# Patient Record
Sex: FEMALE | Race: WHITE | NOT HISPANIC OR LATINO | Employment: OTHER | URBAN - METROPOLITAN AREA
[De-identification: names, ages, dates, MRNs, and addresses within clinical notes are randomized per-mention and may not be internally consistent; named-entity substitution may affect disease eponyms.]

---

## 2017-01-05 ENCOUNTER — OFFICE VISIT (OUTPATIENT)
Dept: DERMATOLOGY | Facility: CLINIC | Age: 77
End: 2017-01-05
Payer: MEDICARE

## 2017-01-05 DIAGNOSIS — L57.0 ACTINIC KERATOSES: ICD-10-CM

## 2017-01-05 DIAGNOSIS — L82.1 SEBORRHEIC KERATOSIS: Primary | ICD-10-CM

## 2017-01-05 PROCEDURE — 99999 PR PBB SHADOW E&M-EST. PATIENT-LVL II: CPT | Mod: PBBFAC,,, | Performed by: DERMATOLOGY

## 2017-01-05 PROCEDURE — 1157F ADVNC CARE PLAN IN RCRD: CPT | Mod: S$GLB,,, | Performed by: DERMATOLOGY

## 2017-01-05 PROCEDURE — 17000 DESTRUCT PREMALG LESION: CPT | Mod: S$GLB,,, | Performed by: DERMATOLOGY

## 2017-01-05 PROCEDURE — 17003 DESTRUCT PREMALG LES 2-14: CPT | Mod: S$GLB,,, | Performed by: DERMATOLOGY

## 2017-01-05 PROCEDURE — 1159F MED LIST DOCD IN RCRD: CPT | Mod: S$GLB,,, | Performed by: DERMATOLOGY

## 2017-01-05 PROCEDURE — 1160F RVW MEDS BY RX/DR IN RCRD: CPT | Mod: S$GLB,,, | Performed by: DERMATOLOGY

## 2017-01-05 PROCEDURE — 99203 OFFICE O/P NEW LOW 30 MIN: CPT | Mod: 25,S$GLB,, | Performed by: DERMATOLOGY

## 2017-01-05 NOTE — MR AVS SNAPSHOT
[facility] Dermatology  [address] [place] [address]  Phone: [phone]  Fax: [phone]                  Shannon PEGUERO Solano   2017 8:15 AM   Office Visit    Description:  Female : 1940   Provider:  Nydia Shahid MD   Department:  [facility] - Dermatology           Reason for Visit     Spot           Diagnoses this Visit        Comments    Seborrheic keratosis    -  Primary     Actinic keratoses                To Do List           Goals (5 Years of Data)     None      Follow-Up and Disposition     Return in about 3 months (around 2017).    Follow-up and Disposition History      [facility] On [facility]     [facility] On Call Nurse Care Line -  Assistance  Registered nurses in the [facility] On Call Center provide clinical advisement, health education, appointment booking, and other advisory services.  Call for this free service at [phone].             Medications           Message regarding Medications     Verify the changes and/or additions to your medication regime listed below are the same as discussed with your clinician today.  If any of these changes or additions are incorrect, please notify your healthcare provider.        STOP taking these medications     vitamin D 1000 units Tab Take 185 mg by mouth once daily.    irbesartan (AVAPRO) 300 MG tablet TAKE 1 TABLET EVERY DAY    hydrocodone-acetaminophen 5-325mg (NORCO) 5-325 mg per tablet Take 1 tablet by mouth every 6 (six) hours as needed for Pain.    DOC-Q-LACE 100 mg capsule TAKE ONE CAPSULE BY MOUTH TWICE DAILY           Verify that the below list of medications is an accurate representation of the medications you are currently taking.  If none reported, the list may be blank. If incorrect, please contact your healthcare provider. Carry this list with you in case of emergency.           Current Medications     aspirin 325 MG tablet Take 325 mg by mouth as needed.     pravastatin (PRAVACHOL) 40 MG tablet Take 1 tablet (40 mg total) by  mouth once daily.           Clinical Reference Information           Allergies as of 1/5/2017     No Known Allergies      Immunizations Administered on Date of Encounter - 1/5/2017     None      [facility] Sign-Up     Activating your [facility] account is as easy as 1-2-3!     1) Visit [email or web address], select Sign Up Now, enter this activation code and your date of birth, then select Next.  [email or web address]  Expires: 2/19/2017  9:06 AM      2) Create a username and password to use when you visit [facility] in the future and select a security question in case you lose your password and select Next.    3) Enter your e-mail address and click Sign Up!    Additional Information  If you have questions, please e-mail [email or web address] or call [phone] to talk to our [facility] staff. Remember, [facility] is NOT to be used for urgent needs. For medical emergencies, dial 911.         Instructions    CRYOSURGERY  [facility] [phone]      Your doctor has used a method called cryosurgery to treat your skin condition. Cryosurgery refers to the use of very cold substances to treat a variety of skin conditions such as warts, pre-skin cancers, molluscum contagiosum, sun spots, and several benign growths. The substance we use in cryosurgery is liquid nitrogen and is so cold (-195 degrees Celsius) that is burns when administered.     Following treatment in the office, the skin may immediately burn and become red. You may find the area around the lesion is affected as well. It is sometimes necessary to treat not only the lesion, but a small area of the surrounding normal skin to achieve a good response.     A blister, and even a blood filled blister, may form after treatment.   This is a normal response. If the blister is painful, it is acceptable to sterilize a needle and with rubbing alcohol and gently pop the blister. It is important that you gently wash the area with soap and warm water as the blister fluid may contain wart  virus if a wart was treated. Do no remove the roof of the blister.     The area treated can take anywhere from 1-3 weeks to heal. Healing time depends on the kind of skin lesion treated, the location, and how aggressively the lesion was treated. It is recommended that the areas treated are covered with Vaseline or bacitracin ointment and a band-aid. If a band-aid is not practical, just ointment applied several times per day will do. Keeping these areas moist will speed the healing time.    Treatment with liquid nitrogen can leave a scar. In dark skin, it may be a light or dark scar, in light skin it may be a white or pink scar. These will generally fade with time.    If you have any concerns after your treatment, please feel free to call the office.         [facility] DERMATOLOGY  [address] [place] [address]  Dept: [phone]  Dept Fax: [phone]

## 2017-01-05 NOTE — PATIENT INSTRUCTIONS
CRYOSURGERY  [facility] [phone]      Your doctor has used a method called cryosurgery to treat your skin condition. Cryosurgery refers to the use of very cold substances to treat a variety of skin conditions such as warts, pre-skin cancers, molluscum contagiosum, sun spots, and several benign growths. The substance we use in cryosurgery is liquid nitrogen and is so cold (-195 degrees Celsius) that is burns when administered.     Following treatment in the office, the skin may immediately burn and become red. You may find the area around the lesion is affected as well. It is sometimes necessary to treat not only the lesion, but a small area of the surrounding normal skin to achieve a good response.     A blister, and even a blood filled blister, may form after treatment.   This is a normal response. If the blister is painful, it is acceptable to sterilize a needle and with rubbing alcohol and gently pop the blister. It is important that you gently wash the area with soap and warm water as the blister fluid may contain wart virus if a wart was treated. Do no remove the roof of the blister.     The area treated can take anywhere from 1-3 weeks to heal. Healing time depends on the kind of skin lesion treated, the location, and how aggressively the lesion was treated. It is recommended that the areas treated are covered with Vaseline or bacitracin ointment and a band-aid. If a band-aid is not practical, just ointment applied several times per day will do. Keeping these areas moist will speed the healing time.    Treatment with liquid nitrogen can leave a scar. In dark skin, it may be a light or dark scar, in light skin it may be a white or pink scar. These will generally fade with time.    If you have any concerns after your treatment, please feel free to call the office.         [facility] DERMATOLOGY  [address] [place] [address]  Dept: [phone]  Dept Fax: [phone]

## 2017-01-05 NOTE — PROGRESS NOTES
Subjective:       Patient ID:  Shannon Solano is a 76 y.o. female who presents for   Chief Complaint   Patient presents with    Spot     c/o rough spots on bilateral forearms x 1 yr or more.      HPI Comments: History of Present Illness: The patient presents with chief complaint of lesions.  Location: bilateral forearms  Duration: 1 year  Signs/Symptoms: scaly    Prior treatments: none    The patient denies personal or family history of skin cancer.        Spot         Review of Systems   Constitutional: Negative for fever, chills, weight loss, fatigue, night sweats and malaise.   Gastrointestinal: Negative for indigestion.   Skin: Negative for daily sunscreen use and activity-related sunscreen use.   Hematologic/Lymphatic: Negative for adenopathy. Does not bruise/bleed easily.        Objective:    Physical Exam   Constitutional: She appears well-developed and well-nourished. No distress.   Neurological: She is alert and oriented to person, place, and time. She is not disoriented.   Psychiatric: She has a normal mood and affect.   Skin:   Areas Examined (abnormalities noted in diagram):   Head / Face Inspection Performed  Neck Inspection Performed  Chest / Axilla Inspection Performed  Abdomen Inspection Performed  Back Inspection Performed  RUE Inspected  LUE Inspection Performed  RLE Inspected  LLE Inspection Performed  Nails and Digits Inspection Performed                       Diagram Legend     Erythematous scaling macule/papule c/w actinic keratosis       Vascular papule c/w angioma      Pigmented verrucoid papule/plaque c/w seborrheic keratosis         Assessment / Plan:        Seborrheic keratosis  Reassurance given. Discussed diagnosis and that lesions are benign.  AAD handout given.     Actinic keratoses  Cryosurgery Procedure Note    The patient is informed of the precancerous quality and need for treatment of these lesions. After risks, benefits and alternatives explained, including blistering, pain,  hyper- and hypopigmentation, patient verbally consents to cryotherapy to precancerous lesions. Liquid nitrogen cryosurgery is applied to the 9 actinic keratoses, as detailed in the physical exam, to produce a freeze injury. The patient is aware that blisters may form and is instructed on wound care with gentle cleansing and use of vaseline ointment to keep moist until healed. The patient is supplied a handout on cryosurgery and is instructed to call if lesions do not completely resolve.           Return in about 3 months (around 4/5/2017).

## 2017-01-23 RX ORDER — IRBESARTAN 300 MG/1
300 TABLET ORAL DAILY
Refills: 2 | COMMUNITY
Start: 2016-12-19 | End: 2017-01-23 | Stop reason: SDUPTHER

## 2017-01-23 RX ORDER — IRBESARTAN 300 MG/1
300 TABLET ORAL DAILY
Qty: 30 TABLET | Refills: 0 | Status: SHIPPED | OUTPATIENT
Start: 2017-01-23 | End: 2017-07-02 | Stop reason: SDUPTHER

## 2017-01-23 NOTE — TELEPHONE ENCOUNTER
----- Message from Izzy Cardenas sent at 1/23/2017  4:28 PM CST -----  Contact: Charisse - [facility]  Charisse calling for Rx refill request on Avapro 300 mg...    [id number] - [place] - [address] AT [facility]  [address]  [place] [address]  Phone: [phone] Fax: [phone]    Thx///channing

## 2017-01-30 ENCOUNTER — OFFICE VISIT (OUTPATIENT)
Dept: FAMILY MEDICINE | Facility: CLINIC | Age: 77
End: 2017-01-30
Payer: MEDICARE

## 2017-01-30 VITALS
TEMPERATURE: 96 F | SYSTOLIC BLOOD PRESSURE: 126 MMHG | OXYGEN SATURATION: 95 % | DIASTOLIC BLOOD PRESSURE: 70 MMHG | HEART RATE: 70 BPM | WEIGHT: 149.06 LBS | HEIGHT: 66 IN | RESPIRATION RATE: 16 BRPM | BODY MASS INDEX: 23.95 KG/M2

## 2017-01-30 DIAGNOSIS — H61.21 IMPACTED CERUMEN OF RIGHT EAR: ICD-10-CM

## 2017-01-30 DIAGNOSIS — J06.9 VIRAL UPPER RESPIRATORY TRACT INFECTION: Primary | ICD-10-CM

## 2017-01-30 DIAGNOSIS — R10.9 FLANK PAIN: ICD-10-CM

## 2017-01-30 LAB
BILIRUB SERPL-MCNC: NEGATIVE MG/DL
BLOOD URINE, POC: NEGATIVE
COLOR, POC UA: YELLOW
FLUAV AG SPEC QL IA: NEGATIVE
FLUBV AG SPEC QL IA: NEGATIVE
GLUCOSE UR QL STRIP: NORMAL
KETONES UR QL STRIP: NEGATIVE
LEUKOCYTE ESTERASE URINE, POC: NEGATIVE
NITRITE, POC UA: NEGATIVE
PH, POC UA: 5
PROTEIN, POC: NEGATIVE
SPECIFIC GRAVITY, POC UA: 1.02
SPECIMEN SOURCE: NORMAL
UROBILINOGEN, POC UA: NORMAL

## 2017-01-30 PROCEDURE — 69210 REMOVE IMPACTED EAR WAX UNI: CPT | Mod: S$GLB,,, | Performed by: NURSE PRACTITIONER

## 2017-01-30 PROCEDURE — 1159F MED LIST DOCD IN RCRD: CPT | Mod: S$GLB,,, | Performed by: NURSE PRACTITIONER

## 2017-01-30 PROCEDURE — 3078F DIAST BP <80 MM HG: CPT | Mod: S$GLB,,, | Performed by: NURSE PRACTITIONER

## 2017-01-30 PROCEDURE — 96372 THER/PROPH/DIAG INJ SC/IM: CPT | Mod: 59,S$GLB,, | Performed by: NURSE PRACTITIONER

## 2017-01-30 PROCEDURE — 87400 INFLUENZA A/B EACH AG IA: CPT | Mod: 59

## 2017-01-30 PROCEDURE — 81002 URINALYSIS NONAUTO W/O SCOPE: CPT | Mod: S$GLB,,, | Performed by: NURSE PRACTITIONER

## 2017-01-30 PROCEDURE — 1157F ADVNC CARE PLAN IN RCRD: CPT | Mod: S$GLB,,, | Performed by: NURSE PRACTITIONER

## 2017-01-30 PROCEDURE — 99999 PR PBB SHADOW E&M-EST. PATIENT-LVL IV: CPT | Mod: PBBFAC,,, | Performed by: NURSE PRACTITIONER

## 2017-01-30 PROCEDURE — 99213 OFFICE O/P EST LOW 20 MIN: CPT | Mod: 25,S$GLB,, | Performed by: NURSE PRACTITIONER

## 2017-01-30 PROCEDURE — 1160F RVW MEDS BY RX/DR IN RCRD: CPT | Mod: S$GLB,,, | Performed by: NURSE PRACTITIONER

## 2017-01-30 PROCEDURE — 3074F SYST BP LT 130 MM HG: CPT | Mod: S$GLB,,, | Performed by: NURSE PRACTITIONER

## 2017-01-30 RX ORDER — BENZONATATE 100 MG/1
200 CAPSULE ORAL 3 TIMES DAILY PRN
Qty: 40 CAPSULE | Refills: 0 | Status: SHIPPED | OUTPATIENT
Start: 2017-01-30 | End: 2017-02-09

## 2017-01-30 RX ORDER — METHYLPREDNISOLONE ACETATE 40 MG/ML
40 INJECTION, SUSPENSION INTRA-ARTICULAR; INTRALESIONAL; INTRAMUSCULAR; SOFT TISSUE
Status: COMPLETED | OUTPATIENT
Start: 2017-01-30 | End: 2017-01-30

## 2017-01-30 RX ORDER — FLUTICASONE PROPIONATE 50 MCG
1 SPRAY, SUSPENSION (ML) NASAL DAILY
Qty: 16 G | Refills: 0 | Status: SHIPPED | OUTPATIENT
Start: 2017-01-30 | End: 2017-10-06

## 2017-01-30 RX ADMIN — METHYLPREDNISOLONE ACETATE 40 MG: 40 INJECTION, SUSPENSION INTRA-ARTICULAR; INTRALESIONAL; INTRAMUSCULAR; SOFT TISSUE at 03:01

## 2017-01-30 NOTE — MR AVS SNAPSHOT
[facility] Medicine  [address]  [facility] [address]  Phone: [phone]  Fax: [phone]                  Shannon Solano   2017 2:00 PM   Office Visit    Description:  Female : 1940   Provider:  Neelima Thornton NP   Department:  [facility]           Reason for Visit     Cough     Dizziness           Diagnoses this Visit        Comments    Viral upper respiratory tract infection    -  Primary     Flank pain         Impacted cerumen of right ear                To Do List           Future Appointments        Provider Department Dept Phone    2017 10:00 AM Nydia Shahid MD [facility] - Dermatology [phone]      Goals (5 Years of Data)     None       These Medications        Disp Refills Start End    fluticasone (FLONASE) 50 mcg/actuation nasal spray 16 g 0 2017     1 spray by Each Nare route once daily. - Each Nare    [email or web address] [id number] [facility] [address] AT [facility] [address]  Ph #: [phone]       benzonatate (TESSALON) 100 MG capsule 40 capsule 0 2017    Take 2 capsules (200 mg total) by mouth 3 (three) times daily as needed. - Oral    Pharmacy: [facility] [id number] [facility] [address] AT [facility] [address]  Ph #: [phone]         [facility] On Call     [facility] On [facility] Nurse Care Line -  Assistance  Registered nurses in the [facility] On Call Center provide clinical advisement, health education, appointment booking, and other advisory services.  Call for this free service at [phone].             Medications           Message regarding Medications     Verify the changes and/or additions to your medication regime listed below are the same as discussed with your clinician today.  If any of these changes or additions are incorrect, please notify your healthcare provider.        START taking these NEW medications        Refills     "fluticasone (FLONASE) 50 mcg/actuation nasal spray 0    Si spray by Each Nare route once daily.    Class: Normal    Route: Each Nare    benzonatate (TESSALON) 100 MG capsule 0    Sig: Take 2 capsules (200 mg total) by mouth 3 (three) times daily as needed.    Class: Normal    Route: Oral      These medications were administered today        Dose Freq    methylPREDNISolone acetate injection 40 mg 40 mg Clinic/HOD 1 time    Sig: Inject 1 mL (40 mg total) into the muscle one time.    Class: Normal    Route: Intramuscular           Verify that the below list of medications is an accurate representation of the medications you are currently taking.  If none reported, the list may be blank. If incorrect, please contact your healthcare provider. Carry this list with you in case of emergency.           Current Medications     aspirin 325 MG tablet Take 325 mg by mouth as needed.     irbesartan (AVAPRO) 300 MG tablet Take 1 tablet (300 mg total) by mouth once daily.    pravastatin (PRAVACHOL) 40 MG tablet Take 1 tablet (40 mg total) by mouth once daily.    benzonatate (TESSALON) 100 MG capsule Take 2 capsules (200 mg total) by mouth 3 (three) times daily as needed.    fluticasone (FLONASE) 50 mcg/actuation nasal spray 1 spray by Each Nare route once daily.           Clinical Reference Information           Vital Signs - Last Recorded  Most recent update: 2017  2:14 PM by Verna Solorzano LPN    BP Pulse Temp Resp Ht Wt    126/70 70 96.4 °F (35.8 °C) (Tympanic) 16 5' 6" (1.676 m) 67.6 kg (149 lb 0.5 oz)    SpO2 BMI             95% 24.05 kg/m2         Blood Pressure          Most Recent Value    BP  126/70      Allergies as of 2017     No Known Allergies      Immunizations Administered on Date of Encounter - 2017     None      Orders Placed During Today's Visit      Normal Orders This Visit    Ambulatory Referral to ENT     Influenza antigen Nasopharyngeal Swab     POCT URINE DIPSTICK WITHOUT MICROSCOPE     "   Administrations This Visit     methylPREDNISolone acetate injection 40 mg     Admin Date Action Dose Route Administered By             01/30/2017 Given 40 mg Intramuscular Verna Solorzano LPN                      [facility] Sign-Up     Activating your [facility] account is as easy as 1-2-3!     1) Visit [email or web address], select Sign Up Now, enter this activation code and your date of birth, then select Next.  [email or web address]  Expires: 2/19/2017  9:06 AM      2) Create a username and password to use when you visit [facility] in the future and select a security question in case you lose your password and select Next.    3) Enter your e-mail address and click Sign Up!    Additional Information  If you have questions, please e-mail [email or web address] or call [phone] to talk to our [facility] staff. Remember, [facility] is NOT to be used for urgent needs. For medical emergencies, dial 911.

## 2017-01-30 NOTE — PATIENT INSTRUCTIONS
Earwax Removal    The ear canal makes earwax from the canals lining. The ears make wax to lubricate and protect the ear canal. The ear canal is the tube that connects the middle ear to the outside of the ear. The wax protects the ear from bacteria, infection, and damage from water or trauma.  The wax that forms in the canal naturally moves toward the outside of the ear and falls out. In some cases, the ear may make too much wax. If the wax causes problems or keeps the healthcare provider from seeing into the ear, the extra wax may be removed.  Too much wax can affect your hearing. It can cause itching. In rare cases, it can be painful. Earwax should not be removed unless it is causing a problem. You should not stick objects into your ear to remove wax unless told to do so by your healthcare provider.  Healthcare providers can remove earwax safely. It is important to stay still during the procedure to avoid damage to the ear canal. But removing earwax generally doesnt hurt. You will not usually need anesthesia or pain medicine when the provider removes the earwax.  A number of conditions lead to earwax buildup. These include some skin problems, a narrow ear canal, or ears that make too much earwax. Using cotton swabs in the canal pushes earwax deeper into the ear and contributes to the buildup of earwax.  Home care  · The healthcare provider may recommend mineral oil or an over-the-counter eardrop to use at home to soften the earwax. Use these products only if the provider recommends them. Use these products only if the provider recommends them. Carefully follow the instructions given.  · Dont use mineral oil or OTC eardrops if you might have an ear infection or a ruptured eardrum. Tell your healthcare provider right away if you have diabetes or an immune disorder.  · Dont use cotton swabs in your ears. Cotton swabs may push wax deeper into the ear canal or damage the eardrum. Use cotton gauze or a wet  washcloth  to gently remove wax on the outside of the ear and around the opening to the ear canal.  · Don't use any probing device or object such as cotton-tipped swabs or stephani pins to clean the inside of your ears.  · Dont use ear candles to clean your ears. Candling can be dangerous. It can burn the ear canal. It can also make the condition worse instead of better.  · Dont use cold water to rinse the ear. This will make you dizzy. If your provider tells you to rinse your ear, use only warm water or follow his or her instructions.  · Check the ear for signs of infection or irritation listed below under When to seek medical advice.  Steps for using eardrops  1. Warm the medicine bottle by rubbing it between your hands for a few minutes.  2. Lie down on your side, with the affected ear up.  3. Place the recommended number of drops in the ear. Wet a cotton ball with the medicine. Gently put the cotton ball into the ear opening.  Follow-up care  Follow up with your healthcare provider, or as directed.  When to seek medical advice  Call the provider right away if you have:  · Ear pain that gets worse  · Fever of 100.4F°F (38°C) or higher, or as directed by your healthcare provider  · Worsening wax buildup  · Severe pain, dizziness, or nausea  · Bleeding from the ear  · Hearing problems  · Signs of irritation from the eardrops, such as burning, stinging, or swelling and tenderness  · Foul-smelling fluid draining from the ear  · Swelling, redness, or tenderness of the outer ear  · Headache, neck pain, or stiff neck  © 2036-3961 [employer]. [address] [place] [address], [place] [address]. All rights reserved. This information is not intended as a substitute for professional medical care. Always follow your healthcare professional's instructions.        Flank Pain, Uncertain Cause  The flank is the area between your upper abdomen and your back. Pain there is often caused by a problem with your kidneys. It might  be a kidney infection or a kidney stone. Other causes of flank pain include spinal arthritis, a pinched nerve from a back injury, or a back muscle strain or spasm.  The cause of your flank pain is not certain. You may need other tests.  Home care  Follow these tips when caring for yourself at home:  · You may use acetaminophen or ibuprofen to control pain, unless your health care provider prescribed another medicine Note: If you have chronic liver or kidney disease, talk with your provider before taking these medicines. Also talk with your provider first if youve had a stomach ulcer or GI bleeding.  · If the cause of your pain is coming from the muscles, you may get relief with ice or heat: Ice: During the first 2 days after the injury, put an ice pack on the painful area for 20 minutes every 2 to 4 hours. This will reduce swelling and pain. A hot shower, hot bath, or heating pad works well for muscle spasm. You can start with ice, then switch to heat after 2 days. You might find that alternating ice and heat works well. Use the method that feels the best to you.  Follow-up care  Follow up with your health care provider. if your symptoms dont get better over the next few days.  When to seek medical advice  Call your health care provider right away if any of these happen:  · Repeated vomiting  · Fever of 100.4ºF (38ºC) or higher, or as directed by your health care provider  · Flank pain that gets worse  · Pain that spreads to the front of your belly (abdomen)  · Dizziness, weakness, or fainting  · Blood in your urine  · Burning feeling when you urinate or the need to urinate often  · Pain in one of your legs that gets worse  · Numbness or weakness in a leg  © 9094-6442 [employer]. [address] [place] [address], [place] [address]. All rights reserved. This information is not intended as a substitute for professional medical care. Always follow your healthcare professional's instructions.        Back Pain  (Acute or Chronic)    Back pain is one of the most common problems. The good news is that most people feel better in 1 to 2 weeks, and most of the rest in 1 to 2 months. Most people can remain active.  People experience and describe pain differently; not everyone is the same.  · The pain can be sharp, stabbing, shooting, aching, cramping or burning.  · Movement, standing, bending, lifting, sitting, or walking may worsen pain.  · It can be localized to one spot or area, or it can be more generalized.  · It can spread or radiate upwards, to the front, or go down your arms or legs (sciatica).  · It can cause muscle spasm.  Most of the time, mechanical problems with the muscles or spine cause the pain. Mechanical problems are usually caused by an injury to the muscles or ligaments. While illness can cause back pain, it is usually not caused by a serious illness. Mechanical problems include:   · Physical activity such as sports, exercise, work, or normal activity  · Overexertion, lifting, pushing, pulling incorrectly or too aggressively  · Sudden twisting, bending, or stretching from an accident, or accidental movement  · Poor posture  · Stretching or moving wrong, without noticing pain at the time  · Poor coordination, lack of regular exercise (check with your doctor about this)  · Spinal disc disease or arthritis  · Stress  Pain can also be related to pregnancy, or illness like appendicitis, bladder or kidney infections, pelvic infections, and many other things.  Acute back pain usually gets better in 1 to 2 weeks. Back pain related to disk disease, arthritis in the spinal joints or spinal stenosis (narrowing of the spinal canal) can become chronic and last for months or years.  Unless you had a physical injury (for example, a car accident or fall) X-rays are usually not needed for the initial evaluation of back pain. If pain continues and does not respond to medical treatment, X-rays and other tests may be  needed.  Home care  Try these home care recommendations:  · When in bed, try to find a position of comfort. A firm mattress is best. Try lying flat on your back with pillows under your knees. You can also try lying on your side with your knees bent up towards your chest and a pillow between your knees.  · At first, do not try to stretch out the sore spots. If there is a strain, it is not like the good soreness you get after exercising without an injury. In this case, stretching may make it worse.  · Avoid prolong sitting, long car rides, or travel. This puts more stress on the lower back than standing or walking.  · During the first 24 to 72 hours after an acute injury or flare up of chronic back pain, apply an ice pack to the painful area for 20 minutes and then remove it for 20 minutes. Do this over a period of 60 to 90 minutes or several times a day. This will reduce swelling and pain. Wrap the ice pack in a thin towel or plastic to protect your skin.  · You can start with ice, then switch to heat. Heat (hot shower, hot bath, or heating pad) reduces pain and works well for muscle spasms. Heat can be applied to the painful area for 20 minutes then remove it for 20 minutes. Do this over a period of 60 to 90 minutes or several times a day. Do not sleep on a heating pad. It can lead to skin burns or tissue damage.  · You can alternate ice and heat therapy. Talk with your doctor about the best treatment for your back pain.  · Therapeutic massage can help relax the back muscles without stretching them.  · Be aware of safe lifting methods and do not lift anything without stretching first.  Medicines  Talk to your doctor before using medicine, especially if you have other medical problems or are taking other medicines.  · You may use over-the-counter medicine as directed on the bottle to control pain, unless another pain medicine was prescribed. If you have chronic conditions like diabetes, liver or kidney disease,  stomach ulcers, or gastrointestinal bleeding, or are taking blood thinners, talk to your doctor before taking any medicine.  · Be careful if you are given a prescription medicines, narcotics, or medicine for muscle spasms. They can cause drowsiness, affect your coordination, reflexes, and judgement. Do not drive or operate heavy machinery.  Follow-up care  Follow up with your healthcare provider, or as advised.   A radiologist will review any X-rays that were taken. Your provide will notify you of any new findings that may affect your care.  Call 911  Call emergency services if any of the following occur:  · Trouble breathing  · Confusion  · Very drowsy or trouble awakening  · Fainting or loss of consciousness  · Rapid or very slow heart rate  · Loss of bowel or bladder control  When to seek medical advice  Call your healthcare provider right away if any of these occur:   · Pain becomes worse or spreads to your legs  · Weakness or numbness in one or both legs  · Numbness in the groin or genital area  © 0612-1473 [employer]. [address] [place] [address], [place] [address]. All rights reserved. This information is not intended as a substitute for professional medical care. Always follow your healthcare professional's instructions.

## 2017-01-31 NOTE — PROGRESS NOTES
Subjective:       Patient ID: Shannon Solano is a 76 y.o. female.    Chief Complaint: Cough and Dizziness  Pt reports to clinic with chief complaints of URI symptoms, and bilateral flank pain. URI symptoms began 2 days ago.  Pt has not taken anything for relief of symptoms.  She does report bilateral flank pain.  Onset several days to weeks.  Does acknowledge strong odor from urine. Denies dysuria or suprapubic pain.    URI    This is a new problem. The current episode started in the past 7 days. The problem has been unchanged. There has been no fever. Associated symptoms include congestion, coughing, a plugged ear sensation and rhinorrhea. Pertinent negatives include no ear pain or sore throat. She has tried nothing for the symptoms. The treatment provided no relief.     Review of Systems   Constitutional: Negative.    HENT: Positive for congestion and rhinorrhea. Negative for ear pain and sore throat.    Respiratory: Positive for cough.    Cardiovascular: Negative.    Gastrointestinal: Negative.    Genitourinary: Positive for flank pain.   Neurological: Negative.    Psychiatric/Behavioral: Negative.        Objective:      Physical Exam   Constitutional: She is oriented to person, place, and time. She appears well-developed and well-nourished.   HENT:   Head: Normocephalic.   Left Ear: Tympanic membrane normal.   Nose: Mucosal edema and rhinorrhea present. Right sinus exhibits no maxillary sinus tenderness and no frontal sinus tenderness. Left sinus exhibits no maxillary sinus tenderness and no frontal sinus tenderness.   Mouth/Throat: Posterior oropharyngeal erythema present.   Right TM cerumen impaction   Eyes: EOM are normal.   Neck: Neck supple.   Cardiovascular: Normal rate and normal heart sounds.    Pulmonary/Chest: Effort normal and breath sounds normal.   Abdominal: Soft. Bowel sounds are normal. There is CVA tenderness.   Lymphadenopathy:     She has no cervical adenopathy.   Neurological: She is alert  and oriented to person, place, and time.   Skin: Skin is warm and dry.   Psychiatric: She has a normal mood and affect.   Vitals reviewed.      Assessment:       1. Viral upper respiratory tract infection    2. Flank pain    3. Impacted cerumen of right ear        Plan:   Viral upper respiratory tract infection  -     fluticasone (FLONASE) 50 mcg/actuation nasal spray; 1 spray by Each Nare route once daily.  Dispense: 16 g; Refill: 0  -     benzonatate (TESSALON) 100 MG capsule; Take 2 capsules (200 mg total) by mouth 3 (three) times daily as needed.  Dispense: 40 capsule; Refill: 0  -     Influenza antigen Nasopharyngeal Swab  -     methylPREDNISolone acetate injection 40 mg; Inject 1 mL (40 mg total) into the muscle one time.  -symptomatic treatment discussed. Verbalized understanding  Flank pain  -     POCT URINE DIPSTICK WITHOUT MICROSCOPE    Impacted cerumen of right ear  -     Ambulatory Referral to ENT  Pt was not able to tolerate ear irrigation or scooping with curette.  Partial impaction was able to be removed.     No Follow-up on file.

## 2017-05-24 ENCOUNTER — OFFICE VISIT (OUTPATIENT)
Dept: FAMILY MEDICINE | Facility: CLINIC | Age: 77
End: 2017-05-24
Payer: MEDICARE

## 2017-05-24 ENCOUNTER — LAB VISIT (OUTPATIENT)
Dept: LAB | Facility: HOSPITAL | Age: 77
End: 2017-05-24
Attending: FAMILY MEDICINE
Payer: MEDICARE

## 2017-05-24 VITALS
HEART RATE: 83 BPM | SYSTOLIC BLOOD PRESSURE: 132 MMHG | BODY MASS INDEX: 24.91 KG/M2 | WEIGHT: 154.31 LBS | TEMPERATURE: 99 F | DIASTOLIC BLOOD PRESSURE: 78 MMHG | OXYGEN SATURATION: 96 %

## 2017-05-24 DIAGNOSIS — G57.31 NEUROPATHY OF RIGHT PERONEAL NERVE: ICD-10-CM

## 2017-05-24 DIAGNOSIS — R79.9 ABNORMAL FINDING OF BLOOD CHEMISTRY: ICD-10-CM

## 2017-05-24 DIAGNOSIS — R25.2 CRAMPS OF RIGHT LOWER EXTREMITY: Primary | ICD-10-CM

## 2017-05-24 DIAGNOSIS — R25.2 CRAMPS OF RIGHT LOWER EXTREMITY: ICD-10-CM

## 2017-05-24 LAB
ALBUMIN SERPL BCP-MCNC: 3.8 G/DL
ALP SERPL-CCNC: 97 U/L
ALT SERPL W/O P-5'-P-CCNC: 18 U/L
ANION GAP SERPL CALC-SCNC: 7 MMOL/L
AST SERPL-CCNC: 22 U/L
BASOPHILS # BLD AUTO: 0.04 K/UL
BASOPHILS NFR BLD: 0.5 %
BILIRUB SERPL-MCNC: 0.7 MG/DL
BUN SERPL-MCNC: 24 MG/DL
CALCIUM SERPL-MCNC: 9.5 MG/DL
CHLORIDE SERPL-SCNC: 105 MMOL/L
CO2 SERPL-SCNC: 26 MMOL/L
CREAT SERPL-MCNC: 1.1 MG/DL
DIFFERENTIAL METHOD: NORMAL
EOSINOPHIL # BLD AUTO: 0.2 K/UL
EOSINOPHIL NFR BLD: 2.3 %
ERYTHROCYTE [DISTWIDTH] IN BLOOD BY AUTOMATED COUNT: 13.7 %
EST. GFR  (AFRICAN AMERICAN): 55.2 ML/MIN/1.73 M^2
EST. GFR  (NON AFRICAN AMERICAN): 47.9 ML/MIN/1.73 M^2
FERRITIN SERPL-MCNC: 93 NG/ML
GLUCOSE SERPL-MCNC: 81 MG/DL
HCT VFR BLD AUTO: 43 %
HGB BLD-MCNC: 13.9 G/DL
IRON SERPL-MCNC: 113 UG/DL
LYMPHOCYTES # BLD AUTO: 2.4 K/UL
LYMPHOCYTES NFR BLD: 29.3 %
MCH RBC QN AUTO: 30.4 PG
MCHC RBC AUTO-ENTMCNC: 32.3 %
MCV RBC AUTO: 94 FL
MONOCYTES # BLD AUTO: 0.8 K/UL
MONOCYTES NFR BLD: 9.1 %
NEUTROPHILS # BLD AUTO: 4.8 K/UL
NEUTROPHILS NFR BLD: 58.4 %
PLATELET # BLD AUTO: 175 K/UL
PMV BLD AUTO: 11.4 FL
POTASSIUM SERPL-SCNC: 4.9 MMOL/L
PROT SERPL-MCNC: 7.2 G/DL
RBC # BLD AUTO: 4.57 M/UL
SODIUM SERPL-SCNC: 138 MMOL/L
WBC # BLD AUTO: 8.2 K/UL

## 2017-05-24 PROCEDURE — 3078F DIAST BP <80 MM HG: CPT | Mod: S$GLB,,, | Performed by: FAMILY MEDICINE

## 2017-05-24 PROCEDURE — 85025 COMPLETE CBC W/AUTO DIFF WBC: CPT

## 2017-05-24 PROCEDURE — 99499 UNLISTED E&M SERVICE: CPT | Mod: S$GLB,,, | Performed by: FAMILY MEDICINE

## 2017-05-24 PROCEDURE — 99999 PR PBB SHADOW E&M-EST. PATIENT-LVL II: CPT | Mod: PBBFAC,,, | Performed by: FAMILY MEDICINE

## 2017-05-24 PROCEDURE — 80053 COMPREHEN METABOLIC PANEL: CPT

## 2017-05-24 PROCEDURE — 83540 ASSAY OF IRON: CPT

## 2017-05-24 PROCEDURE — 3075F SYST BP GE 130 - 139MM HG: CPT | Mod: S$GLB,,, | Performed by: FAMILY MEDICINE

## 2017-05-24 PROCEDURE — 99214 OFFICE O/P EST MOD 30 MIN: CPT | Mod: S$GLB,,, | Performed by: FAMILY MEDICINE

## 2017-05-24 PROCEDURE — 82728 ASSAY OF FERRITIN: CPT

## 2017-05-24 PROCEDURE — 36415 COLL VENOUS BLD VENIPUNCTURE: CPT | Mod: PO

## 2017-05-24 PROCEDURE — 1159F MED LIST DOCD IN RCRD: CPT | Mod: S$GLB,,, | Performed by: FAMILY MEDICINE

## 2017-05-24 PROCEDURE — 1160F RVW MEDS BY RX/DR IN RCRD: CPT | Mod: S$GLB,,, | Performed by: FAMILY MEDICINE

## 2017-05-24 RX ORDER — GABAPENTIN 100 MG/1
300 CAPSULE ORAL NIGHTLY
Qty: 90 CAPSULE | Refills: 2 | Status: SHIPPED | OUTPATIENT
Start: 2017-05-24 | End: 2018-07-18 | Stop reason: SDUPTHER

## 2017-05-24 NOTE — PROGRESS NOTES
Subjective:       Patient ID: Shannon Solano is a 77 y.o. female.    Chief Complaint: No chief complaint on file.    77 y old female with bilateral peroneal neuropathy and  LE  Venous insufficiency now with nocturnal   R leg muscle cramps for the last month  , last 5 min or 10 , she rubs affected exetremity  when this occur .  She also has numbness on dorsum of both feet . Not on any meds . No weakness .       Review of Systems   Constitutional: Negative.    HENT: Negative.    Respiratory: Negative.    Cardiovascular: Negative.    Gastrointestinal: Negative.    Musculoskeletal: Negative for arthralgias, back pain, gait problem, myalgias, neck pain and neck stiffness.   Neurological: Positive for numbness. Negative for weakness.       Objective:      Physical Exam   Constitutional: She is oriented to person, place, and time. She appears well-developed and well-nourished. No distress.   HENT:   Head: Normocephalic and atraumatic.   Right Ear: External ear normal.   Left Ear: External ear normal.   Mouth/Throat: No oropharyngeal exudate.   Eyes: Conjunctivae and EOM are normal. Pupils are equal, round, and reactive to light. Right eye exhibits no discharge. Left eye exhibits no discharge. No scleral icterus.   Neck: Normal range of motion. Neck supple. No JVD present. No tracheal deviation present. No thyromegaly present.   Cardiovascular: Normal rate, regular rhythm and normal heart sounds.  Exam reveals no gallop and no friction rub.    No murmur heard.  Pulmonary/Chest: Effort normal and breath sounds normal. No stridor. No respiratory distress. She has no wheezes. She has no rales. She exhibits no tenderness.   Abdominal: Soft. Bowel sounds are normal. She exhibits no distension. There is no tenderness. There is no rebound and no guarding.   Musculoskeletal: Normal range of motion.        Right lower leg: She exhibits swelling.        Left lower leg: She exhibits swelling.        Feet:    decreased sensation to pin  prick on dorsum of both feet    Lymphadenopathy:     She has no cervical adenopathy.   Neurological: She is alert and oriented to person, place, and time. She displays no tremor. A sensory deficit is present. She exhibits normal muscle tone.   Skin: Skin is warm and dry. She is not diaphoretic.   Psychiatric: She has a normal mood and affect. Her behavior is normal. Judgment and thought content normal.       Assessment:     Diagnoses and all orders for this visit:    Cramps of right lower extremity  -     CBC auto differential; Future  -     Comprehensive metabolic panel; Future    Neuropathy of right peroneal nerve  -     CBC auto differential; Future  -     Comprehensive metabolic panel; Future  -     Iron; Future  -     Ferritin; Future    Abnormal finding of blood chemistry   -     Iron; Future  -     Ferritin; Future    Other orders  -     gabapentin (NEURONTIN) 100 MG capsule; Take 3 capsules (300 mg total) by mouth every evening.      Plan:   Start Neurontin, SE discussed . Reassess in 1 m . Leg stretches handout was proided

## 2017-06-13 ENCOUNTER — PATIENT OUTREACH (OUTPATIENT)
Dept: ADMINISTRATIVE | Facility: HOSPITAL | Age: 77
End: 2017-06-13

## 2017-06-13 DIAGNOSIS — E78.5 DYSLIPIDEMIA: Primary | ICD-10-CM

## 2017-06-13 NOTE — PROGRESS NOTES
I spoke with pt that schedule her lab for tomorrow. Pt declined to have the dexa scan-Bone density. Pt verbalized understanding of appt.

## 2017-06-14 ENCOUNTER — LAB VISIT (OUTPATIENT)
Dept: LAB | Facility: HOSPITAL | Age: 77
End: 2017-06-14
Attending: FAMILY MEDICINE
Payer: MEDICARE

## 2017-06-14 DIAGNOSIS — E78.5 DYSLIPIDEMIA: ICD-10-CM

## 2017-06-14 LAB
CHOLEST/HDLC SERPL: 5.3 {RATIO}
HDL/CHOLESTEROL RATIO: 18.8 %
HDLC SERPL-MCNC: 224 MG/DL
HDLC SERPL-MCNC: 42 MG/DL
LDLC SERPL CALC-MCNC: 156.4 MG/DL
NONHDLC SERPL-MCNC: 182 MG/DL
TRIGL SERPL-MCNC: 128 MG/DL

## 2017-06-14 PROCEDURE — 80061 LIPID PANEL: CPT

## 2017-06-14 PROCEDURE — 36415 COLL VENOUS BLD VENIPUNCTURE: CPT | Mod: PO

## 2017-06-19 ENCOUNTER — TELEPHONE (OUTPATIENT)
Dept: FAMILY MEDICINE | Facility: CLINIC | Age: 77
End: 2017-06-19

## 2017-06-19 NOTE — TELEPHONE ENCOUNTER
Returned call. Spoke with patient informed of results and appt needed. Patient verbalized understanding and stated she would make an appt as soon as her husbands surgery is done.

## 2017-06-19 NOTE — TELEPHONE ENCOUNTER
----- Message from Scarlett Vasquez sent at 6/19/2017  2:22 PM CDT -----  needs callback rg results..[phone]

## 2017-07-03 RX ORDER — IRBESARTAN 300 MG/1
TABLET ORAL
Qty: 30 TABLET | Refills: 0 | Status: SHIPPED | OUTPATIENT
Start: 2017-07-03 | End: 2017-08-08 | Stop reason: SDUPTHER

## 2017-07-06 ENCOUNTER — LAB VISIT (OUTPATIENT)
Dept: LAB | Facility: HOSPITAL | Age: 77
End: 2017-07-06
Attending: FAMILY MEDICINE
Payer: MEDICARE

## 2017-07-06 ENCOUNTER — OFFICE VISIT (OUTPATIENT)
Dept: FAMILY MEDICINE | Facility: CLINIC | Age: 77
End: 2017-07-06
Payer: MEDICARE

## 2017-07-06 VITALS
HEIGHT: 66 IN | BODY MASS INDEX: 25 KG/M2 | HEART RATE: 70 BPM | DIASTOLIC BLOOD PRESSURE: 80 MMHG | SYSTOLIC BLOOD PRESSURE: 120 MMHG | OXYGEN SATURATION: 97 % | TEMPERATURE: 96 F | WEIGHT: 155.56 LBS

## 2017-07-06 DIAGNOSIS — R73.03 PRE-DIABETES: ICD-10-CM

## 2017-07-06 DIAGNOSIS — I65.23 BILATERAL CAROTID ARTERY STENOSIS: Primary | ICD-10-CM

## 2017-07-06 DIAGNOSIS — E78.5 DYSLIPIDEMIA: ICD-10-CM

## 2017-07-06 DIAGNOSIS — I63.9 CEREBROVASCULAR ACCIDENT (CVA), UNSPECIFIED MECHANISM: ICD-10-CM

## 2017-07-06 DIAGNOSIS — I77.9 CAROTID ARTERY DISEASE, UNSPECIFIED LATERALITY: ICD-10-CM

## 2017-07-06 PROCEDURE — 83036 HEMOGLOBIN GLYCOSYLATED A1C: CPT

## 2017-07-06 PROCEDURE — 36415 COLL VENOUS BLD VENIPUNCTURE: CPT | Mod: PO

## 2017-07-06 PROCEDURE — 1126F AMNT PAIN NOTED NONE PRSNT: CPT | Mod: S$GLB,,, | Performed by: FAMILY MEDICINE

## 2017-07-06 PROCEDURE — 99999 PR PBB SHADOW E&M-EST. PATIENT-LVL III: CPT | Mod: PBBFAC,,, | Performed by: FAMILY MEDICINE

## 2017-07-06 PROCEDURE — 99214 OFFICE O/P EST MOD 30 MIN: CPT | Mod: S$GLB,,, | Performed by: FAMILY MEDICINE

## 2017-07-06 PROCEDURE — 1159F MED LIST DOCD IN RCRD: CPT | Mod: S$GLB,,, | Performed by: FAMILY MEDICINE

## 2017-07-06 PROCEDURE — 99499 UNLISTED E&M SERVICE: CPT | Mod: S$GLB,,, | Performed by: FAMILY MEDICINE

## 2017-07-06 RX ORDER — PRAVASTATIN SODIUM 40 MG/1
40 TABLET ORAL DAILY
Qty: 90 TABLET | Refills: 0 | Status: SHIPPED | OUTPATIENT
Start: 2017-07-06 | End: 2018-06-25 | Stop reason: SDUPTHER

## 2017-07-06 NOTE — PROGRESS NOTES
Subjective:       Patient ID: Shannon Solano is a 77 y.o. female.    Chief Complaint: discuss results    77 y old female with DLP , S/p CVA in 2013 , carotid  stenosis and pre dm here for f.u / Not  on any diet / She has been off of statin since last summer. + episode of visio loss few week backs. She dd not seek medical attention then       Review of Systems   Constitutional: Negative.    HENT: Negative.    Respiratory: Negative.    Cardiovascular: Negative.    Gastrointestinal: Negative.    Genitourinary: Negative.        Objective:      Physical Exam   Constitutional: She is oriented to person, place, and time. She appears well-developed and well-nourished. No distress.   HENT:   Head: Normocephalic and atraumatic.   Right Ear: External ear normal.   Left Ear: External ear normal.   Mouth/Throat: No oropharyngeal exudate.   Eyes: Conjunctivae and EOM are normal. Pupils are equal, round, and reactive to light. Right eye exhibits no discharge. Left eye exhibits no discharge. No scleral icterus.   Neck: Normal range of motion. Neck supple. No JVD present. No tracheal deviation present. No thyromegaly present.   Cardiovascular: Normal rate, regular rhythm and normal heart sounds.  Exam reveals no gallop and no friction rub.    No murmur heard.  Pulmonary/Chest: Effort normal and breath sounds normal. No stridor. No respiratory distress. She has no wheezes. She has no rales. She exhibits no tenderness.   Abdominal: Soft. Bowel sounds are normal. She exhibits no distension. There is no tenderness. There is no rebound and no guarding.   Musculoskeletal: Normal range of motion.   Lymphadenopathy:     She has no cervical adenopathy.   Neurological: She is alert and oriented to person, place, and time.   Skin: Skin is warm and dry. She is not diaphoretic.   Psychiatric: She has a normal mood and affect. Her behavior is normal. Judgment and thought content normal.       Assessment:       Shannon was seen today for discuss  results.    Diagnoses and all orders for this visit:    Bilateral carotid artery stenosis  -     Cardiology Lab Carotid US Bilateral; Future    Pre-diabetes  -     Hemoglobin A1c; Future    Carotid artery disease, unspecified laterality    Cerebrovascular accident (CVA), unspecified mechanism    Dyslipidemia    Other orders  -     pravastatin (PRAVACHOL) 40 MG tablet; Take 1 tablet (40 mg total) by mouth once daily.      Plan:     Shannon was seen today for discuss results.    Diagnoses and all orders for this visit:    Bilateral carotid artery stenosis  -     Cardiology Lab Carotid US Bilateral; Future    Pre-diabetes  -     Hemoglobin A1c; Future    Other orders  -     pravastatin (PRAVACHOL) 40 MG tablet; Take 1 tablet (40 mg total) by mouth once daily.     Resume statin . Start aspirin also   Get labs . Ex and diet   See No 1 .   F.u in 3 m

## 2017-07-07 LAB
ESTIMATED AVG GLUCOSE: 114 MG/DL
HBA1C MFR BLD HPLC: 5.6 %

## 2017-07-21 ENCOUNTER — TELEPHONE (OUTPATIENT)
Dept: CARDIOLOGY | Facility: CLINIC | Age: 77
End: 2017-07-21

## 2017-07-21 NOTE — TELEPHONE ENCOUNTER
Called to confirm [facility] appointment for Monday with Pt.  She stated that she thought she had cx it and requested to cx it.  She did not want r/s at this time, requesting to just cx it completely.

## 2017-08-08 RX ORDER — IRBESARTAN 300 MG/1
TABLET ORAL
Qty: 30 TABLET | Refills: 0 | Status: SHIPPED | OUTPATIENT
Start: 2017-08-08 | End: 2018-07-02 | Stop reason: SDUPTHER

## 2017-09-07 RX ORDER — IRBESARTAN 300 MG/1
TABLET ORAL
Qty: 30 TABLET | Refills: 0 | Status: SHIPPED | OUTPATIENT
Start: 2017-09-07 | End: 2017-10-06 | Stop reason: SDUPTHER

## 2017-10-06 ENCOUNTER — OFFICE VISIT (OUTPATIENT)
Dept: FAMILY MEDICINE | Facility: CLINIC | Age: 77
End: 2017-10-06
Payer: MEDICARE

## 2017-10-06 VITALS
DIASTOLIC BLOOD PRESSURE: 70 MMHG | TEMPERATURE: 97 F | BODY MASS INDEX: 25.51 KG/M2 | HEIGHT: 66 IN | OXYGEN SATURATION: 97 % | WEIGHT: 158.75 LBS | SYSTOLIC BLOOD PRESSURE: 128 MMHG | HEART RATE: 74 BPM

## 2017-10-06 DIAGNOSIS — N18.30 CKD (CHRONIC KIDNEY DISEASE) STAGE 3, GFR 30-59 ML/MIN: ICD-10-CM

## 2017-10-06 DIAGNOSIS — I10 HYPERTENSION, UNSPECIFIED TYPE: ICD-10-CM

## 2017-10-06 DIAGNOSIS — G57.93 NEUROPATHY OF BOTH FEET: ICD-10-CM

## 2017-10-06 DIAGNOSIS — E78.5 DYSLIPIDEMIA: Primary | ICD-10-CM

## 2017-10-06 DIAGNOSIS — Z86.73 HISTORY OF CVA (CEREBROVASCULAR ACCIDENT): ICD-10-CM

## 2017-10-06 PROCEDURE — 99214 OFFICE O/P EST MOD 30 MIN: CPT | Mod: 25,S$GLB,, | Performed by: FAMILY MEDICINE

## 2017-10-06 PROCEDURE — 90662 IIV NO PRSV INCREASED AG IM: CPT | Mod: S$GLB,,, | Performed by: FAMILY MEDICINE

## 2017-10-06 PROCEDURE — 99499 UNLISTED E&M SERVICE: CPT | Mod: S$GLB,,, | Performed by: FAMILY MEDICINE

## 2017-10-06 PROCEDURE — G0008 ADMIN INFLUENZA VIRUS VAC: HCPCS | Mod: S$GLB,,, | Performed by: FAMILY MEDICINE

## 2017-10-06 PROCEDURE — 99999 PR PBB SHADOW E&M-EST. PATIENT-LVL III: CPT | Mod: PBBFAC,,, | Performed by: FAMILY MEDICINE

## 2017-10-06 RX ORDER — IRBESARTAN 300 MG/1
300 TABLET ORAL DAILY
Qty: 90 TABLET | Refills: 0 | Status: SHIPPED | OUTPATIENT
Start: 2017-10-06 | End: 2017-12-12 | Stop reason: SDUPTHER

## 2017-10-06 RX ORDER — ASPIRIN 81 MG/1
81 TABLET ORAL DAILY
COMMUNITY

## 2017-10-06 NOTE — PROGRESS NOTES
Subjective:       Patient ID: Shannon Solano is a 77 y.o. female.    Chief Complaint: Follow-up (3 mth check ) and warts on her back    77 y old female with DLP, HTN , Hx of CVA, stage 3 CKD   And feet neuropathy here for f.u . Doing well. trying  to stay active . Not on any diet  . Compliant with statin, aspirin and Irbesartan. L foot numbness remains. Takes Neurontin PRN . She has not f.u with Neurology since she had CVA. Avoids NSAIDS      Review of Systems   Constitutional: Negative.    HENT: Negative.    Respiratory: Negative.    Cardiovascular: Negative.    Gastrointestinal: Negative.    Genitourinary: Negative.    Musculoskeletal: Negative.    Neurological: Positive for numbness.       Objective:      Physical Exam   Constitutional: She is oriented to person, place, and time. She appears well-developed and well-nourished. No distress.   HENT:   Head: Normocephalic and atraumatic.   Right Ear: External ear normal.   Left Ear: External ear normal.   Mouth/Throat: No oropharyngeal exudate.   Eyes: Conjunctivae and EOM are normal. Pupils are equal, round, and reactive to light. Right eye exhibits no discharge. Left eye exhibits no discharge. No scleral icterus.   Neck: Normal range of motion. Neck supple. No JVD present. No tracheal deviation present. No thyromegaly present.   Cardiovascular: Normal rate, regular rhythm and normal heart sounds.  Exam reveals no gallop and no friction rub.    No murmur heard.  Pulmonary/Chest: Effort normal and breath sounds normal. No stridor. No respiratory distress. She has no wheezes. She has no rales. She exhibits no tenderness.   Abdominal: Soft. Bowel sounds are normal. She exhibits no distension. There is no tenderness. There is no rebound and no guarding.   Musculoskeletal: Normal range of motion.   Lymphadenopathy:     She has no cervical adenopathy.   Neurological: She is alert and oriented to person, place, and time.   Skin: Skin is warm and dry. She is not  diaphoretic.   Psychiatric: She has a normal mood and affect. Her behavior is normal. Judgment and thought content normal.       Assessment:       Shannon was seen today for follow-up and warts on her back.    Diagnoses and all orders for this visit:    Dyslipidemia  -     CBC auto differential; Future  -     Comprehensive metabolic panel; Future  -     Lipid panel; Future    Hypertension, unspecified type    History of CVA (cerebrovascular accident)    CKD (chronic kidney disease) stage 3, GFR 30-59 ml/min    Neuropathy of both feet    Other orders  -     irbesartan (AVAPRO) 300 MG tablet; Take 1 tablet (300 mg total) by mouth once daily.  -     zoster vaccine live, PF, (ZOSTAVAX) 19,400 unit/0.65 mL injection; Inject 19,400 Units into the skin once.  -     Influenza - High Dose (65+) (PF) (IM)      Plan:   Cont meds. Get labs . Diet and ex  Controlled . Cont med   Stable . Stay on aspirin and statin   Avoid NSAID'S. Will monitor   Advised to be consistent with Gabapentin   Refused colonoscopy

## 2017-10-09 ENCOUNTER — LAB VISIT (OUTPATIENT)
Dept: LAB | Facility: HOSPITAL | Age: 77
End: 2017-10-09
Attending: FAMILY MEDICINE
Payer: MEDICARE

## 2017-10-09 DIAGNOSIS — E78.5 DYSLIPIDEMIA: ICD-10-CM

## 2017-10-09 LAB
ALBUMIN SERPL BCP-MCNC: 3.6 G/DL
ALP SERPL-CCNC: 101 U/L
ALT SERPL W/O P-5'-P-CCNC: 16 U/L
ANION GAP SERPL CALC-SCNC: 7 MMOL/L
AST SERPL-CCNC: 19 U/L
BASOPHILS # BLD AUTO: 0.05 K/UL
BASOPHILS NFR BLD: 0.7 %
BILIRUB SERPL-MCNC: 0.7 MG/DL
BUN SERPL-MCNC: 19 MG/DL
CALCIUM SERPL-MCNC: 9 MG/DL
CHLORIDE SERPL-SCNC: 104 MMOL/L
CHOLEST SERPL-MCNC: 178 MG/DL
CHOLEST/HDLC SERPL: 4.2 {RATIO}
CO2 SERPL-SCNC: 29 MMOL/L
CREAT SERPL-MCNC: 1.1 MG/DL
DIFFERENTIAL METHOD: NORMAL
EOSINOPHIL # BLD AUTO: 0.3 K/UL
EOSINOPHIL NFR BLD: 4.1 %
ERYTHROCYTE [DISTWIDTH] IN BLOOD BY AUTOMATED COUNT: 13.5 %
EST. GFR  (AFRICAN AMERICAN): 55.2 ML/MIN/1.73 M^2
EST. GFR  (NON AFRICAN AMERICAN): 47.9 ML/MIN/1.73 M^2
GLUCOSE SERPL-MCNC: 88 MG/DL
HCT VFR BLD AUTO: 40.6 %
HDLC SERPL-MCNC: 42 MG/DL
HDLC SERPL: 23.6 %
HGB BLD-MCNC: 13.1 G/DL
LDLC SERPL CALC-MCNC: 103.2 MG/DL
LYMPHOCYTES # BLD AUTO: 2.2 K/UL
LYMPHOCYTES NFR BLD: 29.5 %
MCH RBC QN AUTO: 30.6 PG
MCHC RBC AUTO-ENTMCNC: 32.3 G/DL
MCV RBC AUTO: 95 FL
MONOCYTES # BLD AUTO: 0.7 K/UL
MONOCYTES NFR BLD: 8.6 %
NEUTROPHILS # BLD AUTO: 4.3 K/UL
NEUTROPHILS NFR BLD: 56.7 %
NONHDLC SERPL-MCNC: 136 MG/DL
PLATELET # BLD AUTO: 161 K/UL
PMV BLD AUTO: 10.5 FL
POTASSIUM SERPL-SCNC: 4.4 MMOL/L
PROT SERPL-MCNC: 7 G/DL
RBC # BLD AUTO: 4.28 M/UL
SODIUM SERPL-SCNC: 140 MMOL/L
TRIGL SERPL-MCNC: 164 MG/DL
WBC # BLD AUTO: 7.56 K/UL

## 2017-10-09 PROCEDURE — 85025 COMPLETE CBC W/AUTO DIFF WBC: CPT

## 2017-10-09 PROCEDURE — 80053 COMPREHEN METABOLIC PANEL: CPT

## 2017-10-09 PROCEDURE — 36415 COLL VENOUS BLD VENIPUNCTURE: CPT | Mod: PO

## 2017-10-09 PROCEDURE — 80061 LIPID PANEL: CPT

## 2017-12-04 ENCOUNTER — OFFICE VISIT (OUTPATIENT)
Dept: FAMILY MEDICINE | Facility: CLINIC | Age: 77
End: 2017-12-04
Payer: MEDICARE

## 2017-12-04 VITALS
RESPIRATION RATE: 18 BRPM | OXYGEN SATURATION: 95 % | SYSTOLIC BLOOD PRESSURE: 123 MMHG | HEIGHT: 66 IN | DIASTOLIC BLOOD PRESSURE: 78 MMHG | BODY MASS INDEX: 25.44 KG/M2 | TEMPERATURE: 97 F | HEART RATE: 68 BPM | WEIGHT: 158.31 LBS

## 2017-12-04 DIAGNOSIS — Z12.11 ENCOUNTER FOR SCREENING COLONOSCOPY: Primary | ICD-10-CM

## 2017-12-04 DIAGNOSIS — G57.93 NEUROPATHY OF BOTH FEET: ICD-10-CM

## 2017-12-04 DIAGNOSIS — M19.019 AC (ACROMIOCLAVICULAR) ARTHRITIS: ICD-10-CM

## 2017-12-04 DIAGNOSIS — R73.03 PRE-DIABETES: Chronic | ICD-10-CM

## 2017-12-04 DIAGNOSIS — N18.30 CKD (CHRONIC KIDNEY DISEASE) STAGE 3, GFR 30-59 ML/MIN: Chronic | ICD-10-CM

## 2017-12-04 DIAGNOSIS — E78.5 DYSLIPIDEMIA: Primary | Chronic | ICD-10-CM

## 2017-12-04 DIAGNOSIS — I10 ESSENTIAL HYPERTENSION: Chronic | ICD-10-CM

## 2017-12-04 DIAGNOSIS — Z86.73 HISTORY OF STROKE: Chronic | ICD-10-CM

## 2017-12-04 DIAGNOSIS — I77.9 BILATERAL CAROTID ARTERY DISEASE: Chronic | ICD-10-CM

## 2017-12-04 DIAGNOSIS — H91.93 DECREASED HEARING OF BOTH EARS: Primary | ICD-10-CM

## 2017-12-04 PROCEDURE — 96160 PT-FOCUSED HLTH RISK ASSMT: CPT | Mod: S$GLB,,, | Performed by: NURSE PRACTITIONER

## 2017-12-04 PROCEDURE — 99999 PR PBB SHADOW E&M-EST. PATIENT-LVL IV: CPT | Mod: PBBFAC,,, | Performed by: NURSE PRACTITIONER

## 2017-12-04 PROCEDURE — 99499 UNLISTED E&M SERVICE: CPT | Mod: S$GLB,,, | Performed by: NURSE PRACTITIONER

## 2017-12-04 NOTE — PROGRESS NOTES
"Shannon Solano presented for a  Medicare AWV and comprehensive Health Risk Assessment today. The following components were reviewed and updated:    · Medical history  · Family History  · Social history  · Allergies and Current Medications  · Health Risk Assessment  · Health Maintenance  · Care Team     ** See Completed Assessments for Annual Wellness Visit within the encounter summary.**       The following assessments were completed:  · Living Situation  · CAGE  · Depression Screening  · Timed Get Up and Go  · Whisper Test  · Cognitive Function Screening  · Nutrition Screening  · ADL Screening  · PAQ Screening    Vitals:    12/04/17 1310   BP: 123/78   Pulse: 68   Resp: 18   Temp: 97.2 °F (36.2 °C)   TempSrc: Tympanic   SpO2: 95%   Weight: 71.8 kg (158 lb 4.6 oz)   Height: 5' 6" (1.676 m)     Body mass index is 25.55 kg/m².  Physical Exam      Diagnoses and health risks identified today and associated recommendations/orders:    1. Dyslipidemia  Improving. Continue statin therapy. Low fat diet and exercise    2. Essential hypertension  Stable. Continue current treatment plan    3. History of stroke 1/16/13 Rt basal ganglia  resolved    4. Pre-diabetes  stable    5. Bilateral carotid artery disease  stable    6. CKD (chronic kidney disease) stage 3, GFR 30-59 ml/min  10/09/17  eGFR if non African American >60 mL/min/1.73 m^2 47.9      Follow up with pcp     7. AC (acromioclavicular) arthritis  stable    8. Neuropathy of both feet  Stable       Provided Shannon with a 5-10 year written screening schedule and personal prevention plan. Recommendations were developed using the USPSTF age appropriate recommendations. Education, counseling, and referrals were provided as needed. After Visit Summary printed and given to patient which includes a list of additional screenings\tests needed.    No Follow-up on file.    Neelima Thornton NP  "

## 2017-12-12 RX ORDER — IRBESARTAN 300 MG/1
TABLET ORAL
Qty: 90 TABLET | Refills: 0 | Status: SHIPPED | OUTPATIENT
Start: 2017-12-12 | End: 2018-07-01 | Stop reason: SDUPTHER

## 2017-12-14 ENCOUNTER — TELEPHONE (OUTPATIENT)
Dept: FAMILY MEDICINE | Facility: CLINIC | Age: 77
End: 2017-12-14

## 2017-12-14 NOTE — TELEPHONE ENCOUNTER
----- Message from Billy Mcneal sent at 12/14/2017  1:55 PM CST -----  Contact: ptg   States she's calling rg her medicine irbesartan(sp) and states she filled it in Oct and never had a 3mnth supply and has been out for the last 3 days /pt wants to follow up on the refill req and can be reached at [phone]/thanks/dbw

## 2018-04-04 ENCOUNTER — PATIENT OUTREACH (OUTPATIENT)
Dept: ADMINISTRATIVE | Facility: HOSPITAL | Age: 78
End: 2018-04-04

## 2018-04-04 NOTE — PROGRESS NOTES
Pre visit chart audit performed. I spoke with pt that declined to have colonoscopy and bone density scan. Pt verbalized understanding of needed appt's.

## 2018-06-25 ENCOUNTER — HOSPITAL ENCOUNTER (OUTPATIENT)
Dept: RADIOLOGY | Facility: HOSPITAL | Age: 78
Discharge: HOME | End: 2018-06-25
Attending: FAMILY MEDICINE
Payer: MEDICARE

## 2018-06-25 ENCOUNTER — OFFICE VISIT (OUTPATIENT)
Dept: FAMILY MEDICINE | Facility: CLINIC | Age: 78
End: 2018-06-25
Payer: MEDICARE

## 2018-06-25 VITALS
HEART RATE: 78 BPM | OXYGEN SATURATION: 97 % | DIASTOLIC BLOOD PRESSURE: 74 MMHG | TEMPERATURE: 98 F | BODY MASS INDEX: 26.29 KG/M2 | SYSTOLIC BLOOD PRESSURE: 118 MMHG | WEIGHT: 163.56 LBS | HEIGHT: 66 IN

## 2018-06-25 DIAGNOSIS — I63.9 CEREBROVASCULAR ACCIDENT (CVA), UNSPECIFIED MECHANISM: ICD-10-CM

## 2018-06-25 DIAGNOSIS — R55 SYNCOPE, UNSPECIFIED SYNCOPE TYPE: ICD-10-CM

## 2018-06-25 DIAGNOSIS — R41.3 MEMORY LOSS: Primary | ICD-10-CM

## 2018-06-25 DIAGNOSIS — R41.3 MEMORY LOSS: ICD-10-CM

## 2018-06-25 DIAGNOSIS — I77.9 CAROTID DISEASE, BILATERAL: ICD-10-CM

## 2018-06-25 LAB
BILIRUB UR QL STRIP: NEGATIVE
CLARITY UR REFRACT.AUTO: CLEAR
COLOR UR AUTO: YELLOW
GLUCOSE UR QL STRIP: NEGATIVE
HGB UR QL STRIP: NEGATIVE
KETONES UR QL STRIP: NEGATIVE
LEUKOCYTE ESTERASE UR QL STRIP: ABNORMAL
MICROSCOPIC COMMENT: ABNORMAL
NITRITE UR QL STRIP: NEGATIVE
NON-SQ EPI CELLS #/AREA URNS AUTO: <1 /HPF
PH UR STRIP: 5 [PH] (ref 5–8)
PROT UR QL STRIP: NEGATIVE
RBC #/AREA URNS AUTO: 0 /HPF (ref 0–4)
SP GR UR STRIP: 1.01 (ref 1–1.03)
SQUAMOUS #/AREA URNS AUTO: 0 /HPF
URN SPEC COLLECT METH UR: ABNORMAL
UROBILINOGEN UR STRIP-ACNC: NEGATIVE EU/DL
WBC #/AREA URNS AUTO: 7 /HPF (ref 0–5)
WBC CLUMPS UR QL AUTO: ABNORMAL

## 2018-06-25 PROCEDURE — 71046 X-RAY EXAM CHEST 2 VIEWS: CPT | Mod: TC,PO

## 2018-06-25 PROCEDURE — 3078F DIAST BP <80 MM HG: CPT | Mod: CPTII,S$GLB,, | Performed by: FAMILY MEDICINE

## 2018-06-25 PROCEDURE — 81001 URINALYSIS AUTO W/SCOPE: CPT

## 2018-06-25 PROCEDURE — 87086 URINE CULTURE/COLONY COUNT: CPT

## 2018-06-25 PROCEDURE — 99999 PR PBB SHADOW E&M-EST. PATIENT-LVL V: CPT | Mod: PBBFAC,,, | Performed by: FAMILY MEDICINE

## 2018-06-25 PROCEDURE — 71046 X-RAY EXAM CHEST 2 VIEWS: CPT | Mod: 26,,, | Performed by: RADIOLOGY

## 2018-06-25 PROCEDURE — 93005 ELECTROCARDIOGRAM TRACING: CPT | Mod: S$GLB,,, | Performed by: FAMILY MEDICINE

## 2018-06-25 PROCEDURE — 3074F SYST BP LT 130 MM HG: CPT | Mod: CPTII,S$GLB,, | Performed by: FAMILY MEDICINE

## 2018-06-25 PROCEDURE — 93010 ELECTROCARDIOGRAM REPORT: CPT | Mod: S$GLB,,, | Performed by: NUCLEAR MEDICINE

## 2018-06-25 PROCEDURE — 99214 OFFICE O/P EST MOD 30 MIN: CPT | Mod: S$GLB,,, | Performed by: FAMILY MEDICINE

## 2018-06-25 RX ORDER — PRAVASTATIN SODIUM 40 MG/1
40 TABLET ORAL DAILY
Qty: 90 TABLET | Refills: 0 | Status: SHIPPED | OUTPATIENT
Start: 2018-06-25 | End: 2018-09-25 | Stop reason: SDUPTHER

## 2018-06-25 NOTE — PROGRESS NOTES
Subjective:       Patient ID: Shannon Solano is a 78 y.o. female.    Chief Complaint: Memory Loss; Dysuria; and Dizziness    78 y old female with hx of cva and bilateral carotid stenosis  here to discuss memory loss. She has asked daughter to drive her to store and when she gets there she cant remember what items  to get . She cooks , cleans  , does her hygiene. No depression . Has been out of statin for months  since she misplaced bottle . She also fell at the store not long ago .She cant remember what led to fall .Also getting random dizzy spells.  No CP, SOB       Dysuria        Review of Systems   Constitutional: Negative.    HENT: Negative.    Eyes: Negative.    Respiratory: Negative.    Cardiovascular: Negative.    Gastrointestinal: Negative.    Genitourinary: Positive for dysuria.   Musculoskeletal: Negative.    Skin: Negative.    Neurological: Positive for dizziness.   Hematological: Negative.        Objective:      Physical Exam   Constitutional: She is oriented to person, place, and time. She appears well-developed and well-nourished. No distress.   HENT:   Head: Normocephalic and atraumatic.   Right Ear: External ear normal.   Left Ear: External ear normal.   Mouth/Throat: No oropharyngeal exudate.   Eyes: Conjunctivae and EOM are normal. Pupils are equal, round, and reactive to light. Right eye exhibits no discharge. Left eye exhibits no discharge. No scleral icterus.   Neck: Normal range of motion. Neck supple. No JVD present. No tracheal deviation present. No thyromegaly present.   Cardiovascular: Normal rate, regular rhythm and normal heart sounds.  Exam reveals no gallop and no friction rub.    No murmur heard.  Pulmonary/Chest: Effort normal and breath sounds normal. No stridor. No respiratory distress. She has no wheezes. She has no rales. She exhibits no tenderness.   Abdominal: Soft. Bowel sounds are normal. She exhibits no distension. There is no tenderness. There is no rebound and no guarding.    Musculoskeletal: Normal range of motion.   Lymphadenopathy:     She has no cervical adenopathy.   Neurological: She is alert and oriented to person, place, and time. She has normal strength. No cranial nerve deficit or sensory deficit. She displays a negative Romberg sign. GCS eye subscore is 4. GCS verbal subscore is 5. GCS motor subscore is 6.   Skin: Skin is warm and dry. She is not diaphoretic.   Psychiatric: She has a normal mood and affect. Her behavior is normal. Judgment and thought content normal.       Assessment:       1. Memory loss    2. Syncope, unspecified syncope type    3. Cerebrovascular accident (CVA), unspecified mechanism    4. Carotid disease, bilateral        Plan:     Shannon was seen today for memory loss, dysuria and dizziness.    Diagnoses and all orders for this visit:    Memory loss  -     IN OFFICE EKG 12-LEAD (to [place])  -     Ambulatory consult to Cardiology  -     Ambulatory consult to Neurology  -     Urinalysis  -     Urine culture; Future  -     Vitamin B12; Future  -     Folate; Future  -     TSH; Future  -     Comprehensive metabolic panel; Future  -     CBC auto differential; Future  -     X-Ray Chest PA And Lateral; Future  -     US Carotid Bilateral; Future    Syncope, unspecified syncope type  -     IN OFFICE EKG 12-LEAD (to [place])  -     Ambulatory consult to Cardiology  -     Ambulatory consult to Neurology  -     Urinalysis  -     Urine culture; Future  -     Vitamin B12; Future  -     Folate; Future  -     TSH; Future  -     Comprehensive metabolic panel; Future  -     CBC auto differential; Future  -     X-Ray Chest PA And Lateral; Future  -     US Carotid Bilateral; Future    Cerebrovascular accident (CVA), unspecified mechanism    Carotid disease, bilateral    Other orders  -     pravastatin (PRAVACHOL) 40 MG tablet; Take 1 tablet (40 mg total) by mouth once daily.

## 2018-06-26 ENCOUNTER — TELEPHONE (OUTPATIENT)
Dept: FAMILY MEDICINE | Facility: CLINIC | Age: 78
End: 2018-06-26

## 2018-06-26 DIAGNOSIS — R41.3 MEMORY LOSS: Primary | ICD-10-CM

## 2018-06-26 LAB — BACTERIA UR CULT: NO GROWTH

## 2018-06-26 RX ORDER — SULFAMETHOXAZOLE AND TRIMETHOPRIM 800; 160 MG/1; MG/1
1 TABLET ORAL 2 TIMES DAILY
Qty: 14 TABLET | Refills: 0 | Status: SHIPPED | OUTPATIENT
Start: 2018-06-26 | End: 2018-07-03

## 2018-06-26 NOTE — TELEPHONE ENCOUNTER
Spoke with pts daughter and informed provider stated take AZO over the counter for urinary pain and antibiotic sent to the pharmacy and will call back with CT order when order added. Pt daughter verbalized understanding.

## 2018-06-26 NOTE — TELEPHONE ENCOUNTER
----- Message from Minal Mckeon sent at 6/26/2018  1:37 PM CDT -----  Contact: pt daughter Sussy Hi state the patient came in for a urine test on yesterday and is requesting the results. Sussy state the patient is miserable and is in a lot of pain.  Please adv/call [phone].//cw

## 2018-07-02 RX ORDER — IRBESARTAN 300 MG/1
TABLET ORAL
Qty: 90 TABLET | Refills: 0 | Status: SHIPPED | OUTPATIENT
Start: 2018-07-02 | End: 2018-10-27 | Stop reason: SDUPTHER

## 2018-07-05 ENCOUNTER — OFFICE VISIT (OUTPATIENT)
Dept: FAMILY MEDICINE | Facility: CLINIC | Age: 78
End: 2018-07-05
Payer: MEDICARE

## 2018-07-05 VITALS
TEMPERATURE: 98 F | SYSTOLIC BLOOD PRESSURE: 138 MMHG | WEIGHT: 163 LBS | BODY MASS INDEX: 26.2 KG/M2 | OXYGEN SATURATION: 97 % | HEIGHT: 66 IN | HEART RATE: 70 BPM | DIASTOLIC BLOOD PRESSURE: 68 MMHG

## 2018-07-05 DIAGNOSIS — F01.50 VASCULAR DEMENTIA WITHOUT BEHAVIORAL DISTURBANCE: ICD-10-CM

## 2018-07-05 DIAGNOSIS — I10 ESSENTIAL HYPERTENSION: ICD-10-CM

## 2018-07-05 DIAGNOSIS — Z86.73 HISTORY OF STROKE: ICD-10-CM

## 2018-07-05 DIAGNOSIS — G57.93 NEUROPATHY OF BOTH FEET: Primary | ICD-10-CM

## 2018-07-05 PROCEDURE — 99214 OFFICE O/P EST MOD 30 MIN: CPT | Mod: S$GLB,,, | Performed by: FAMILY MEDICINE

## 2018-07-05 PROCEDURE — 3075F SYST BP GE 130 - 139MM HG: CPT | Mod: CPTII,S$GLB,, | Performed by: FAMILY MEDICINE

## 2018-07-05 PROCEDURE — 99999 PR PBB SHADOW E&M-EST. PATIENT-LVL III: CPT | Mod: PBBFAC,,, | Performed by: FAMILY MEDICINE

## 2018-07-05 PROCEDURE — 3078F DIAST BP <80 MM HG: CPT | Mod: CPTII,S$GLB,, | Performed by: FAMILY MEDICINE

## 2018-07-05 NOTE — PROGRESS NOTES
Subjective:       Patient ID: Shannon Solano is a 78 y.o. female.    Chief Complaint: Numbness      HPI Comments:       Current Outpatient Prescriptions:     aspirin (ECOTRIN) 81 MG EC tablet, Take 81 mg by mouth once daily., Disp: , Rfl:     gabapentin (NEURONTIN) 100 MG capsule, Take 3 capsules (300 mg total) by mouth every evening. (Patient taking differently: Take 300 mg by mouth every evening. ), Disp: 90 capsule, Rfl: 2    irbesartan (AVAPRO) 300 MG tablet, TAKE 1 TABLET BY MOUTH EVERY DAY, Disp: 90 tablet, Rfl: 0    pravastatin (PRAVACHOL) 40 MG tablet, Take 1 tablet (40 mg total) by mouth once daily., Disp: 90 tablet, Rfl: 0      This is my 1st time seeing this patient, who presents with her daughter.  Patient is a very vague historian, but together they pain a picture of worsening complaints of numbness in the lower legs and feet bilaterally. She also has been having trouble with memory loss and dizziness with falling, that doctor Vilma evaluated a couple of weeks ago, leading to a Neurology referral, which the patient has cancel twice because she does think she has any memory problems. She has also been put on gabapentin but takes only 1 capsule ( 100 mg) at night, and often in a reactive rather than proactive fashion.  Sometimes she takes 1 capsule in the morning as well.  Prescription calls for 3 capsules at bedtime.  Last night the numbness kept her up all night.  She also describes some leg restlessness which is related.    Patient wanted to be seen today because she was concerned about this being another stroke.  Her symptoms of bilateral and symmetrical.  Her daughter is concerned today because she thinks her mother should be evaluated for memory loss.  She has set a test be done today, which I performed, which showed that she has significant problems with recall and abstract thinking.  She was oriented x3      Review of Systems   Constitutional: Negative for activity change, appetite change  "and fever.   HENT: Negative for sore throat.    Respiratory: Negative for cough and shortness of breath.    Cardiovascular: Negative for chest pain.   Gastrointestinal: Negative for abdominal pain, diarrhea and nausea.   Genitourinary: Negative for difficulty urinating.   Musculoskeletal: Negative for arthralgias and myalgias.   Neurological: Positive for dizziness. Negative for headaches.   Psychiatric/Behavioral: Positive for sleep disturbance. Negative for hallucinations.       Objective:      Vitals:    07/05/18 1102   BP: 138/68   Pulse: 70   Temp: 97.7 °F (36.5 °C)   TempSrc: Tympanic   SpO2: 97%   Weight: 73.9 kg (163 lb 0.5 oz)   Height: 5' 6" (1.676 m)   PainSc: 0-No pain     Physical Exam   Constitutional: She is oriented to person, place, and time. She appears well-developed and well-nourished. No distress.   HENT:   Head: Normocephalic.   Mouth/Throat: No oropharyngeal exudate.   Neck: Neck supple. No thyromegaly present.   Cardiovascular: Normal rate, regular rhythm and normal heart sounds.    No murmur heard.  Pulmonary/Chest: Effort normal and breath sounds normal. She has no wheezes. She has no rales.   Abdominal: Soft. She exhibits no distension.   Musculoskeletal: She exhibits no edema.   Moderately unsteady gait   Lymphadenopathy:     She has no cervical adenopathy.   Neurological: She is alert and oriented to person, place, and time. She has normal strength. She displays a negative Romberg sign. Coordination and gait abnormal.   5/5 motor in lower extremities bilaterally   Skin: Skin is warm and dry. She is not diaphoretic.   Psychiatric: She has a normal mood and affect. Her behavior is normal. Judgment and thought content normal.   Nursing note and vitals reviewed.      Assessment:       1. Neuropathy of both feet    2. Vascular dementia without behavioral disturbance    3. History of stroke 1/16/13 Rt basal ganglia    4. Essential hypertension        Plan:   Neuropathy of both " feet  Comments:  Increase gabapentin to 300 mg at bedtime.  May take 300 mg in the morning as well.  Reassured that there was no stroke suggested by presentation    Vascular dementia without behavioral disturbance  Comments:  P that atient admits that memory problems were demonstrated today.  She will follow up with Neurology as planned    History of stroke 1/16/13 Rt basal ganglia  Comments:  No focal deficits on exam today    Essential hypertension  Comments:  Controlled

## 2018-07-09 ENCOUNTER — TELEPHONE (OUTPATIENT)
Dept: FAMILY MEDICINE | Facility: CLINIC | Age: 78
End: 2018-07-09

## 2018-07-09 NOTE — TELEPHONE ENCOUNTER
----- Message from Luis Waterman sent at 7/9/2018  1:48 PM CDT -----  Contact: pt daughter  Caller is returning the nurse call.                                         [phone]

## 2018-07-09 NOTE — TELEPHONE ENCOUNTER
----- Message from Doris Gleason sent at 7/9/2018  2:03 PM CDT -----  Pt daughter(Sussy Harrington) at [phone]//states she thinks your office may have called her//please call again//thanks/gefof

## 2018-07-18 RX ORDER — GABAPENTIN 100 MG/1
CAPSULE ORAL
Qty: 90 CAPSULE | Refills: 0 | Status: SHIPPED | OUTPATIENT
Start: 2018-07-18 | End: 2018-12-01 | Stop reason: SDUPTHER

## 2018-07-20 ENCOUNTER — TELEPHONE (OUTPATIENT)
Dept: FAMILY MEDICINE | Facility: CLINIC | Age: 78
End: 2018-07-20

## 2018-07-20 NOTE — TELEPHONE ENCOUNTER
----- Message from Lisa Martinez sent at 7/20/2018 11:25 AM CDT -----  Contact: Patient   1. What is the name of the medication you are requesting? Rx Gabapentin  2. What is the dose? n/a  3. How do you take the medication? Orally, topically, etc? oral  4. How often do you take this medication? Three times a day   5. Do you need a 30 day or 90 day supply? 90 days  6. How many refills are you requesting? 3  7. What is your preferred pharmacy and location of the pharmacy?   [facility] [address] [place], [place] - [address] [facility] [address]   [address]  [facility] [address]  Phone: [phone] Fax: [phone]  8. Who can we contact with further questions? Sussy/[phone] or [phone]

## 2018-07-25 ENCOUNTER — OFFICE VISIT (OUTPATIENT)
Dept: CARDIOLOGY | Facility: CLINIC | Age: 78
End: 2018-07-25
Payer: MEDICARE

## 2018-07-25 VITALS
DIASTOLIC BLOOD PRESSURE: 76 MMHG | BODY MASS INDEX: 25.94 KG/M2 | SYSTOLIC BLOOD PRESSURE: 130 MMHG | HEART RATE: 64 BPM | WEIGHT: 161.38 LBS | HEIGHT: 66 IN

## 2018-07-25 DIAGNOSIS — I63.9 CEREBRAL INFARCTION, UNSPECIFIED MECHANISM: ICD-10-CM

## 2018-07-25 DIAGNOSIS — I77.89 OTHER SPECIFIED DISORDERS OF ARTERIES AND ARTERIOLES: ICD-10-CM

## 2018-07-25 DIAGNOSIS — I77.9 BILATERAL CAROTID ARTERY DISEASE: Chronic | ICD-10-CM

## 2018-07-25 DIAGNOSIS — I10 ESSENTIAL HYPERTENSION: Primary | Chronic | ICD-10-CM

## 2018-07-25 DIAGNOSIS — E78.5 DYSLIPIDEMIA: Chronic | ICD-10-CM

## 2018-07-25 DIAGNOSIS — N18.30 CKD (CHRONIC KIDNEY DISEASE) STAGE 3, GFR 30-59 ML/MIN: Chronic | ICD-10-CM

## 2018-07-25 PROCEDURE — 3075F SYST BP GE 130 - 139MM HG: CPT | Mod: CPTII,S$GLB,, | Performed by: INTERNAL MEDICINE

## 2018-07-25 PROCEDURE — 99999 PR PBB SHADOW E&M-EST. PATIENT-LVL III: CPT | Mod: PBBFAC,,, | Performed by: INTERNAL MEDICINE

## 2018-07-25 PROCEDURE — 3078F DIAST BP <80 MM HG: CPT | Mod: CPTII,S$GLB,, | Performed by: INTERNAL MEDICINE

## 2018-07-25 PROCEDURE — 99499 UNLISTED E&M SERVICE: CPT | Mod: S$GLB,,, | Performed by: INTERNAL MEDICINE

## 2018-07-25 PROCEDURE — 99204 OFFICE O/P NEW MOD 45 MIN: CPT | Mod: S$GLB,,, | Performed by: INTERNAL MEDICINE

## 2018-07-25 NOTE — PROGRESS NOTES
Subjective:   Patient ID:  Shannon Solano is a 78 y.o. female who presents for evaluation of Establish Care and Abnormal ECG      79 yo female, referred for care establish.  PMH carotid A Dz, HTN, h/o CVA 3 yrs ago, CKD.  Had one episode of black vision few months ago, Lasted for few seconds. No dizziness, faint, syncope and imbalance.  No chest pain, dyspnea, palpitation, and orthopnea.  EKG NSR and non specfic stt change        Past Medical History:   Diagnosis Date    Anemia     Arthritis     Cataract     Disorder of kidney and ureter     Hyperlipidemia     Hypertension     Osteoarthritis     Polyneuropathy     Stroke        Past Surgical History:   Procedure Laterality Date    APPENDECTOMY      CATARACT EXTRACTION Bilateral 2014    EYE SURGERY Bilateral     HYSTERECTOMY      VARICOSE VEIN SURGERY         Social History   Substance Use Topics    Smoking status: Never Smoker    Smokeless tobacco: Never Used    Alcohol use No       Family History   Problem Relation Age of Onset    Breast cancer Sister     Heart disease Sister     Colon cancer Sister         age 70    Cancer Sister         breast    Obesity Mother     Stroke Mother     Obesity Brother     Alzheimer's disease Brother     Cancer Brother     No Known Problems Sister     Hypertension Daughter     Melanoma Neg Hx        Review of Systems   Constitution: Negative for decreased appetite, diaphoresis, fever, weakness, malaise/fatigue and night sweats.   HENT: Negative for nosebleeds.    Eyes: Negative for blurred vision and double vision.   Cardiovascular: Negative for chest pain, claudication, dyspnea on exertion, irregular heartbeat, leg swelling, near-syncope, orthopnea, palpitations, paroxysmal nocturnal dyspnea and syncope.   Respiratory: Negative for cough, shortness of breath, sleep disturbances due to breathing, snoring, sputum production and wheezing.    Endocrine: Negative for cold intolerance and polyuria.    Hematologic/Lymphatic: Does not bruise/bleed easily.   Skin: Negative for rash.   Musculoskeletal: Negative for back pain, falls, joint pain, joint swelling and neck pain.   Gastrointestinal: Negative for abdominal pain, heartburn, nausea and vomiting.   Genitourinary: Negative for dysuria, frequency and hematuria.   Neurological: Negative for difficulty with concentration, dizziness, focal weakness, headaches, light-headedness, numbness and seizures.   Psychiatric/Behavioral: Negative for depression, memory loss and substance abuse. The patient does not have insomnia.    Allergic/Immunologic: Negative for HIV exposure and hives.       Objective:   Physical Exam   Constitutional: She is oriented to person, place, and time. She appears well-nourished.   HENT:   Head: Normocephalic.   Eyes: Pupils are equal, round, and reactive to light.   Neck: Normal carotid pulses and no JVD present. Carotid bruit is not present. No thyromegaly present.   Cardiovascular: Normal rate, regular rhythm, normal heart sounds and normal pulses.   No extrasystoles are present. PMI is not displaced.  Exam reveals no gallop and no S3.    No murmur heard.  Pulmonary/Chest: Breath sounds normal. No stridor. No respiratory distress.   Abdominal: Soft. Bowel sounds are normal. There is no tenderness. There is no rebound.   Musculoskeletal: Normal range of motion.   Neurological: She is alert and oriented to person, place, and time.   Skin: Skin is intact. No rash noted.   B/l LE varicose   Psychiatric: Her behavior is normal.       Lab Results   Component Value Date    CHOL 178 10/09/2017    CHOL 224 (H) 06/14/2017    CHOL 174 08/07/2015     Lab Results   Component Value Date    HDL 42 10/09/2017    HDL 42 06/14/2017    HDL 41 08/07/2015     Lab Results   Component Value Date    LDLCALC 103.2 10/09/2017    LDLCALC 156.4 06/14/2017    LDLCALC 106.4 08/07/2015     Lab Results   Component Value Date    TRIG 164 (H) 10/09/2017    TRIG 128  06/14/2017    TRIG 133 08/07/2015     Lab Results   Component Value Date    CHOLHDL 23.6 10/09/2017    CHOLHDL 18.8 (L) 06/14/2017    CHOLHDL 23.6 08/07/2015       Chemistry        Component Value Date/Time     06/25/2018 1625    K 3.8 06/25/2018 1625     06/25/2018 1625    CO2 27 06/25/2018 1625    BUN 22 06/25/2018 1625    CREATININE 1.0 06/25/2018 1625    GLU 59 (L) 06/25/2018 1625        Component Value Date/Time    CALCIUM 9.3 06/25/2018 1625    ALKPHOS 96 06/25/2018 1625    AST 22 06/25/2018 1625    ALT 21 06/25/2018 1625    BILITOT 0.5 06/25/2018 1625    ESTGFRAFRICA >60.0 06/25/2018 1625    EGFRNONAA 53.3 (A) 06/25/2018 1625          Lab Results   Component Value Date    HGBA1C 5.6 07/06/2017     Lab Results   Component Value Date    TSH 2.062 06/25/2018     No results found for: INR, PROTIME  Lab Results   Component Value Date    WBC 9.39 06/25/2018    HGB 13.1 06/25/2018    HCT 41.5 06/25/2018    MCV 96 06/25/2018     06/25/2018     BNP  @LABRCNTIP(BNP,BNPTRIAGEBLO)@  CrCl cannot be calculated (Patient's most recent lab result is older than the maximum 7 days allowed.).  No results found in the last 24 hours.  No results found in the last 24 hours.  No results found in the last 24 hours.    Assessment:      1. Essential hypertension    2. Dyslipidemia    3. Bilateral carotid artery disease    4. CKD (chronic kidney disease) stage 3, GFR 30-59 ml/min    5. Cerebral infarction, unspecified mechanism    6. Other specified disorders of arteries and arterioles         Plan:   ECHO due to abnl EKG  Carotid US due to black vision  Continue current meds.  Recommend heart-healthy diet, weight control and regular exercise.  Nivia. Risk modification.   RTC as indicated    I have reviewed all pertinent labs and cardiac studies. Plans and recommendations have been formulated under my direct supervision. All questions answered and patient voiced understanding. Patient to continue current medications.

## 2018-07-25 NOTE — LETTER
July 25, 2018      Leslie Poe MD  [address] [address]           [facility] - Cardiology  [address] [address]  Phone: [phone]  Fax: [phone]          Patient: Shannon Solano   MR Number: [id number]   YOB: 1940   Date of Visit: 7/25/2018       Dear Dr. Leslie Poe:    Thank you for referring Shannon Solano to me for evaluation. Attached you will find relevant portions of my assessment and plan of care.    If you have questions, please do not hesitate to call me. I look forward to following Shannon Solano along with you.    Sincerely,    Roberto Montes MD    Enclosure  CC:  No Recipients    If you would like to receive this communication electronically, please contact [email or web address] or [phone] to request more information on [facility] Link access.    For providers and/or their staff who would like to refer a patient to [facility], please contact us through our one-stop-shop provider referral line, [facility], at [phone].    If you feel you have received this communication in error or would no longer like to receive these types of communications, please e-mail [email or web address]

## 2018-08-03 ENCOUNTER — TELEPHONE (OUTPATIENT)
Dept: CARDIOLOGY | Facility: CLINIC | Age: 78
End: 2018-08-03

## 2018-08-03 NOTE — TELEPHONE ENCOUNTER
Spoke with pt who thought she was scheduled for another EKG.  Pt was informed that she was having a Carotid US and Echo and that it fell under the dept of EKG.  Pt verbalized understanding.  All questions answered.     ----- Message from Yessi Gonzalez sent at 8/3/2018  8:34 AM CDT -----  Contact: daughter/Sussy Harrington  States she has an appt on 8/13 and she has already done an EKG. Please call Sussy Harrington at [phone]. Thank you

## 2018-08-13 ENCOUNTER — CLINICAL SUPPORT (OUTPATIENT)
Dept: CARDIOLOGY | Facility: CLINIC | Age: 78
End: 2018-08-13
Attending: INTERNAL MEDICINE
Payer: MEDICARE

## 2018-08-13 DIAGNOSIS — I77.89 OTHER SPECIFIED DISORDERS OF ARTERIES AND ARTERIOLES: ICD-10-CM

## 2018-08-13 DIAGNOSIS — E78.5 DYSLIPIDEMIA: Chronic | ICD-10-CM

## 2018-08-13 DIAGNOSIS — I10 ESSENTIAL HYPERTENSION: Chronic | ICD-10-CM

## 2018-08-13 LAB
AORTIC VALVE REGURGITATION: ABNORMAL
DIASTOLIC DYSFUNCTION: NO
ESTIMATED PA SYSTOLIC PRESSURE: 26.04
MITRAL VALVE MOBILITY: NORMAL
MITRAL VALVE REGURGITATION: ABNORMAL
RETIRED EF AND QEF - SEE NOTES: 55 (ref 55–65)

## 2018-08-13 PROCEDURE — 93306 TTE W/DOPPLER COMPLETE: CPT | Mod: S$GLB,,, | Performed by: NUCLEAR MEDICINE

## 2018-08-13 PROCEDURE — 93880 EXTRACRANIAL BILAT STUDY: CPT | Mod: 26,,, | Performed by: INTERNAL MEDICINE

## 2018-08-13 PROCEDURE — 93880 EXTRACRANIAL BILAT STUDY: CPT

## 2018-08-14 ENCOUNTER — TELEPHONE (OUTPATIENT)
Dept: CARDIOLOGY | Facility: CLINIC | Age: 78
End: 2018-08-14

## 2018-08-14 LAB — INTERNAL CAROTID STENOSIS: NORMAL

## 2018-08-14 NOTE — TELEPHONE ENCOUNTER
"Notified patient regarding ECHO results.  Advised of findings of Normal EF and moderate MR.  Patient stated, "I feel fine and I do all my own housework."  Instructed to notify office of any questions or concerns.  Patient verbalized understanding.  "

## 2018-08-15 ENCOUNTER — TELEPHONE (OUTPATIENT)
Dept: CARDIOLOGY | Facility: CLINIC | Age: 78
End: 2018-08-15

## 2018-08-15 NOTE — TELEPHONE ENCOUNTER
----- Message from Roberto Montes MD sent at 8/14/2018 10:14 PM CDT -----  CAROTID US SHOWED MILD Dz

## 2018-09-25 RX ORDER — PRAVASTATIN SODIUM 40 MG/1
TABLET ORAL
Qty: 90 TABLET | Refills: 0 | Status: SHIPPED | OUTPATIENT
Start: 2018-09-25 | End: 2019-06-01 | Stop reason: SDUPTHER

## 2018-10-16 ENCOUNTER — IMMUNIZATION (OUTPATIENT)
Dept: FAMILY MEDICINE | Facility: CLINIC | Age: 78
End: 2018-10-16
Payer: MEDICARE

## 2018-10-16 PROCEDURE — 90662 IIV NO PRSV INCREASED AG IM: CPT | Mod: PBBFAC,PO

## 2018-10-18 ENCOUNTER — TELEPHONE (OUTPATIENT)
Dept: NEUROLOGY | Facility: CLINIC | Age: 78
End: 2018-10-18

## 2018-10-18 ENCOUNTER — LAB VISIT (OUTPATIENT)
Dept: LAB | Facility: HOSPITAL | Age: 78
End: 2018-10-18
Payer: MEDICARE

## 2018-10-18 ENCOUNTER — OFFICE VISIT (OUTPATIENT)
Dept: NEUROLOGY | Facility: CLINIC | Age: 78
End: 2018-10-18
Payer: MEDICARE

## 2018-10-18 VITALS
DIASTOLIC BLOOD PRESSURE: 74 MMHG | HEIGHT: 66 IN | WEIGHT: 161.81 LBS | HEART RATE: 76 BPM | SYSTOLIC BLOOD PRESSURE: 132 MMHG | BODY MASS INDEX: 26.01 KG/M2

## 2018-10-18 DIAGNOSIS — R41.3 MEMORY DIFFICULTIES: ICD-10-CM

## 2018-10-18 DIAGNOSIS — I87.2 VENOUS INSUFFICIENCY: Chronic | ICD-10-CM

## 2018-10-18 DIAGNOSIS — G60.8 CSPN (CRYPTOGENIC SENSORY POLYNEUROPATHY): ICD-10-CM

## 2018-10-18 DIAGNOSIS — G60.8 CSPN (CRYPTOGENIC SENSORY POLYNEUROPATHY): Primary | ICD-10-CM

## 2018-10-18 PROCEDURE — 36415 COLL VENOUS BLD VENIPUNCTURE: CPT

## 2018-10-18 PROCEDURE — 99213 OFFICE O/P EST LOW 20 MIN: CPT | Mod: PBBFAC | Performed by: PSYCHIATRY & NEUROLOGY

## 2018-10-18 PROCEDURE — 84165 PROTEIN E-PHORESIS SERUM: CPT | Mod: 26,,, | Performed by: PATHOLOGY

## 2018-10-18 PROCEDURE — 86334 IMMUNOFIX E-PHORESIS SERUM: CPT | Mod: 26,,, | Performed by: PATHOLOGY

## 2018-10-18 PROCEDURE — 99999 PR PBB SHADOW E&M-EST. PATIENT-LVL III: CPT | Mod: PBBFAC,,, | Performed by: PSYCHIATRY & NEUROLOGY

## 2018-10-18 PROCEDURE — 86334 IMMUNOFIX E-PHORESIS SERUM: CPT

## 2018-10-18 PROCEDURE — 84165 PROTEIN E-PHORESIS SERUM: CPT

## 2018-10-18 PROCEDURE — 99499 UNLISTED E&M SERVICE: CPT | Mod: S$GLB,,, | Performed by: PSYCHIATRY & NEUROLOGY

## 2018-10-18 PROCEDURE — 3075F SYST BP GE 130 - 139MM HG: CPT | Mod: CPTII,,, | Performed by: PSYCHIATRY & NEUROLOGY

## 2018-10-18 PROCEDURE — 1101F PT FALLS ASSESS-DOCD LE1/YR: CPT | Mod: CPTII,,, | Performed by: PSYCHIATRY & NEUROLOGY

## 2018-10-18 PROCEDURE — 99204 OFFICE O/P NEW MOD 45 MIN: CPT | Mod: S$PBB,,, | Performed by: PSYCHIATRY & NEUROLOGY

## 2018-10-18 PROCEDURE — 99499 UNLISTED E&M SERVICE: CPT | Mod: ,,, | Performed by: PSYCHIATRY & NEUROLOGY

## 2018-10-18 PROCEDURE — 3078F DIAST BP <80 MM HG: CPT | Mod: CPTII,,, | Performed by: PSYCHIATRY & NEUROLOGY

## 2018-10-18 RX ORDER — MULTIVITAMIN
1 TABLET ORAL DAILY
COMMUNITY

## 2018-10-18 NOTE — PATIENT INSTRUCTIONS
Fall Prevention  Falls often occur due to slipping, tripping or losing your balance. Millions of people fall every year and injure themselves. Here are ways to reduce your risk of falling again.  · Think about your fall, was there anything that caused your fall that can be fixed, removed, or replaced?  · Make your home safe by keeping walkways clear of objects you may trip over.  · Use non-slip pads under rugs. Do not use area rugs or small throw rugs.  · Use non-slip mats in bathtubs and showers.  · Install handrails and lights on staircases.  · Do not walk in poorly lit areas.  · Do not stand on chairs or wobbly ladders.  · Use caution when reaching overhead or looking upward. This position can cause a loss of balance.  · Be sure your shoes fit properly, have non-slip bottoms and are in good condition.   · Wear shoes both inside and out. Avoid going barefoot or wearing slippers.  · Be cautious when going up and down stairs, curbs, and when walking on uneven sidewalks.  · If your balance is poor, consider using a cane or walker.  · If your fall was related to alcohol use, stop or limit alcohol intake.   · If your fall was related to use of sleeping medicines, talk to your doctor about this. You may need to reduce your dosage at bedtime if you awaken during the night to go to the bathroom.    · To reduce the need for nighttime bathroom trips:  ¨ Avoid drinking fluids for several hours before going to bed  ¨ Empty your bladder before going to bed  ¨ Men can keep a urinal at the bedside  · Stay as active as you can. Balance, flexibility, strength, and endurance all come from exercise. They all play a role in preventing falls. Ask your healthcare provider which types of activity are right for you.  · Get your vision checked on a regular basis.  · If you have pets, know where they are before you stand up or walk so you don't trip over them.  · Use night lights.  Date Last Reviewed: 11/5/2015  © 8120-8775 [employer]. [address] [place] [address], [place] [address]. All rights reserved. This information is not intended as a substitute for professional medical care. Always follow your healthcare professional's instructions.

## 2018-10-18 NOTE — LETTER
October 18, 2018      Leslie Poe MD  [address] [facility] [address]           [facility]  [address] [place] [id number]  Phone: [phone]  Fax: [phone]          Patient: Shannon Solano   MR Number: [id number]   YOB: 1940   Date of Visit: 10/18/2018       Dear Dr. Leslie Poe:    Thank you for referring Shannon Solano to me for evaluation. Attached you will find relevant portions of my assessment and plan of care.    If you have questions, please do not hesitate to call me. I look forward to following Shannon Solano along with you.    Sincerely,    Blaise Guerrero MD    Enclosure  CC:  No Recipients    If you would like to receive this communication electronically, please contact [email or web address] or [phone] to request more information on [facility] [address] Link access.    For providers and/or their staff who would like to refer a patient to [facility], please contact us through our one-stop-shop provider referral line, [facility], at [phone].    If you feel you have received this communication in error or would no longer like to receive these types of communications, please e-mail [email or web address]

## 2018-10-18 NOTE — TELEPHONE ENCOUNTER
----- Message from Shanice Soler sent at 10/18/2018  1:44 PM CDT -----  Contact: Sussy- daughter  Pt is calling to check the status of rx (cream) that was to be called in at pharmacy. Please call pt back at [phone].      Thanks,   Shanice Soler

## 2018-10-18 NOTE — PROGRESS NOTES
"Subjective:       Patient ID: Shannon Solnao is a 78 y.o. female.    Chief Complaint: Follow-up (numbness of feet and sometimes hands)    HPI     The patient is accompanied by her daughter who said that she is here for numbness in her feet. The patient started complaining of bilateral symmetric feet paroxysmal tingling and burning pain about 2 years ago.  The patient usually notices the pain after significant physical activity or prolonged sitting or standing.  The pain is paroxysmal and not persistent  The pain and tingling involves the whole feet, mainly the sole area and progresses to the very distal part of the legs.  The patient denies any hand symptoms. No ankle or foot weakness. The patient denies any low back with radiation to the legs.  The patient has good control on her bowel and bladder. No history of DM. No known history of thyroid disease. No history of malignancies.  No history of toxic environmental exposure.  No history of autoimmune disease. No history of excessive alcohol or recreational drug abuse. No history of chronic hepatitis or HIV. No history of malabsorption or nutritional deficiencies. In  B12, FA, TFT are NL. NCS/EMG in 2016 showed PN. She is on  mg TID which seems to be helpful.    Reviewed the records and there was some concern about her memory. The patient describes some memory lapses especially with "to-do-list" but otherwise fully independent and functions at high level. In  B12, FA, TFT are NL. RPR is -ve. CTH in 2016 is unremarkable and CTH was ordered and pending. No behavioral problems.       Review of Systems   Constitutional: Negative for appetite change and fatigue.   HENT: Negative for hearing loss and tinnitus.    Eyes: Negative for photophobia and visual disturbance.   Respiratory: Negative for apnea and shortness of breath.    Cardiovascular: Negative for chest pain and palpitations.   Gastrointestinal: Negative for nausea and vomiting.   Endocrine: " Negative for cold intolerance and heat intolerance.   Genitourinary: Negative for difficulty urinating and urgency.   Musculoskeletal: Negative for arthralgias, back pain, gait problem, joint swelling, myalgias, neck pain and neck stiffness.   Skin: Negative for color change and rash.   Allergic/Immunologic: Negative for environmental allergies and immunocompromised state.   Neurological: Positive for numbness. Negative for dizziness, tremors, seizures, syncope, facial asymmetry, speech difficulty, weakness, light-headedness and headaches.   Hematological: Negative for adenopathy. Does not bruise/bleed easily.   Psychiatric/Behavioral: Negative for agitation, behavioral problems, confusion, decreased concentration, dysphoric mood, hallucinations, self-injury, sleep disturbance and suicidal ideas. The patient is not hyperactive.          Current Outpatient Medications:     aspirin (ECOTRIN) 81 MG EC tablet, Take 81 mg by mouth once daily., Disp: , Rfl:     gabapentin (NEURONTIN) 100 MG capsule, TAKE 3 CAPSULES(300 MG) BY MOUTH EVERY EVENING, Disp: 90 capsule, Rfl: 0    irbesartan (AVAPRO) 300 MG tablet, TAKE 1 TABLET BY MOUTH EVERY DAY, Disp: 90 tablet, Rfl: 0    multivitamin (ONE DAILY MULTIVITAMIN) per tablet, Take 1 tablet by mouth once daily., Disp: , Rfl:     pravastatin (PRAVACHOL) 40 MG tablet, TAKE 1 TABLET BY MOUTH EVERY DAY, Disp: 90 tablet, Rfl: 0  Past Medical History:   Diagnosis Date    Anemia     Arthritis     Cataract     Disorder of kidney and ureter     Hyperlipidemia     Hypertension     Osteoarthritis     Polyneuropathy     Stroke      Past Surgical History:   Procedure Laterality Date    APPENDECTOMY      CATARACT EXTRACTION Bilateral 2014    EYE SURGERY Bilateral     HYSTERECTOMY      VARICOSE VEIN SURGERY       Social History     Socioeconomic History    Marital status:      Spouse name: Not on file    Number of children: 2    Years of education: Not on file     Highest education level: Not on file   Social Needs    Financial resource strain: Not on file    Food insecurity - worry: Not on file    Food insecurity - inability: Not on file    Transportation needs - medical: Not on file    Transportation needs - non-medical: Not on file   Occupational History    Not on file   Tobacco Use    Smoking status: Never Smoker    Smokeless tobacco: Never Used   Substance and Sexual Activity    Alcohol use: No    Drug use: No    Sexual activity: Not Currently   Other Topics Concern    Are you pregnant or think you may be? No    Breast-feeding No   Social History Narrative    Not on file       Objective:     GENERAL APPEARANCE:     The patient looks comfortable.    No signs of medical or psychiatric distress.    Normal breathing pattern.    No dysmorphic features    Normal eye contact.     GENERAL MEDICAL EXAM:    HEENT:  Head is atraumatic normocephalic. No tender temporal arteries.     Neck and Axillae: No JVD. No carotid bruits. No thyromegaly. No lymphadenopathy.    Cardiopulmonary: No cyanosis. No tachypnea. Normal respiratory effort.  Clear breath sounds. Normal heart sounds with regular rhythm and no murmurs.    Gastrointestinal:  No stomas or lesions. No hernias.  Abdomen is soft non-tender. No masses or organomegaly.    Skin, Hair and Nails: No pathognonomic skin rash. No neurofibromatosis.   No stigmata of autoimmune disease.     Limbs: Varicose veins. No edema. Symmetric pulses.     Muskoskeletal: No deformities.No spine tenderness.   No signs of longstanding neuropathy. No dislocations or fractures.        Neurologic Exam     Mental Status   Oriented to person, place, and time.   Registration: recalls 3 of 3 objects. Recall at 5 minutes: recalls 3 of 3 objects. Follows 3 step commands.   Attention: normal. Concentration: normal.   Speech: speech is normal   Level of consciousness: alert  Knowledge: good and consistent with education. Able to perform simple  calculations.   Able to name object. Able to read. Able to repeat. Able to write. Normal comprehension.     MOCA 30    Visuospatial/Executive  5   Naming                          3  Attention                        6  Language                      3  Abstraction                    2  Recall                            5  Orientation                     6       Cranial Nerves     CN II   Visual fields full to confrontation.   Visual acuity: normal  Right visual field deficit: none  Left visual field deficit: none     CN III, IV, VI   Pupils are equal, round, and reactive to light.  Extraocular motions are normal.   Right pupil: Size: 2 mm. Shape: regular. Reactivity: brisk. Consensual response: intact. Accommodation: intact.   Left pupil: Size: 2 mm. Shape: regular. Reactivity: brisk. Consensual response: intact. Accommodation: intact.   CN III: no CN III palsy  CN VI: no CN VI palsy  Nystagmus: none   Diplopia: none  Ophthalmoparesis: none  Upgaze: normal  Downgaze: normal  Conjugate gaze: present  Vestibulo-ocular reflex: present    CN V   Facial sensation intact.   Right facial sensation deficit: none  Left facial sensation deficit: none  Right corneal reflex: normal  Left corneal reflex: normal    CN VII   Right facial weakness: none  Left facial weakness: none  Right taste: normal  Left taste: normal    CN VIII   CN VIII normal.   Hearing: intact  Right Rinne: AC > BC  Left Rinne: AC > BC  Guzmán: does not lateralize     CN IX, X   CN IX normal.   CN X normal.   Palate: symmetric  Right gag reflex: normal  Left gag reflex: normal    CN XI   CN XI normal.   Right sternocleidomastoid strength: normal  Left sternocleidomastoid strength: normal  Right trapezius strength: normal  Left trapezius strength: normal    CN XII   CN XII normal.   Tongue: not atrophic  Fasciculations: absent  Tongue deviation: none    Motor Exam   Muscle bulk: normal  Overall muscle tone: normal  Right arm tone: normal  Left arm tone:  normal  Right arm pronator drift: absent  Left arm pronator drift: absent  Right leg tone: normal  Left leg tone: normal    Strength   Strength / throughout.   Right neck flexion:   Left neck flexion:   Right neck extension:   Left neck extension:   Right deltoid:   Left deltoid:   Right biceps:   Left biceps:   Right triceps:   Left triceps:   Right wrist flexion:   Left wrist flexion:   Right wrist extension:   Left wrist extension:   Right interossei:   Left interossei:   Right abdominals:   Left abdominals:   Right iliopsoas:   Left iliopsoas:   Right quadriceps:   Left quadriceps:   Right hamstrin/5  Left hamstrin/5  Right glutei:   Left glutei:   Right anterior tibial:   Left anterior tibial:   Right posterior tibial:   Left posterior tibial:   Right peroneal:   Left peroneal:   Right gastroc:   Left gastroc:     Sensory Exam   Right arm light touch: decreased from fingers  Left arm light touch: decreased from fingers  Right leg light touch: decreased from toes  Left leg light touch: decreased from toes  Right arm vibration: normal  Left arm vibration: normal  Right leg vibration: decreased from toes  Left leg vibration: decreased from toes  Right arm proprioception: normal  Left arm proprioception: normal  Right leg proprioception: decreased from toes  Left leg proprioception: decreased from toes  Right arm pinprick: decreased from fingers  Left arm pinprick: decreased from fingers  Right leg pinprick: decreased from toes  Left leg pinprick: decreased from toes  Graphesthesia: normal  Stereognosis: normal    Gait, Coordination, and Reflexes     Gait  Gait: wide-based    Coordination   Romberg: positive  Finger to nose coordination: normal  Heel to shin coordination: normal    Tremor   Resting tremor: absent  Intention tremor: absent  Action tremor: absent    Reflexes   Right brachioradialis: 2+  Left  brachioradialis: 2+  Right biceps: 2+  Left biceps: 2+  Right triceps: 2+  Left triceps: 2+  Right patellar: 1+  Left patellar: 1+  Right achilles: 0  Left achilles: 0  Right plantar: normal  Left plantar: normal  Right Ochoa: absent  Left Ochoa: absent  Right ankle clonus: absent  Left ankle clonus: absent  Right pendular knee jerk: absent  Left pendular knee jerk: absent      Lab Results   Component Value Date    WBC 9.39 06/25/2018    HGB 13.1 06/25/2018    HCT 41.5 06/25/2018    MCV 96 06/25/2018     06/25/2018     Sodium   Date Value Ref Range Status   06/25/2018 142 136 - 145 mmol/L Final     Potassium   Date Value Ref Range Status   06/25/2018 3.8 3.5 - 5.1 mmol/L Final     Chloride   Date Value Ref Range Status   06/25/2018 106 95 - 110 mmol/L Final     CO2   Date Value Ref Range Status   06/25/2018 27 23 - 29 mmol/L Final     Glucose   Date Value Ref Range Status   06/25/2018 59 (L) 70 - 110 mg/dL Final     BUN, Bld   Date Value Ref Range Status   06/25/2018 22 8 - 23 mg/dL Final     Creatinine   Date Value Ref Range Status   06/25/2018 1.0 0.5 - 1.4 mg/dL Final     Calcium   Date Value Ref Range Status   06/25/2018 9.3 8.7 - 10.5 mg/dL Final     Total Protein   Date Value Ref Range Status   06/25/2018 7.1 6.0 - 8.4 g/dL Final     Albumin   Date Value Ref Range Status   06/25/2018 3.8 3.5 - 5.2 g/dL Final     Total Bilirubin   Date Value Ref Range Status   06/25/2018 0.5 0.1 - 1.0 mg/dL Final     Comment:     For infants and newborns, interpretation of results should be based  on gestational age, weight and in agreement with clinical  observations.  Premature Infant recommended reference ranges:  Up to 24 hours.............<8.0 mg/dL  Up to 48 hours............<12.0 mg/dL  3-5 days..................<15.0 mg/dL  6-29 days.................<15.0 mg/dL       Alkaline Phosphatase   Date Value Ref Range Status   06/25/2018 96 55 - 135 U/L Final     AST   Date Value Ref Range Status   06/25/2018 22 10 -  40 U/L Final     ALT   Date Value Ref Range Status   06/25/2018 21 10 - 44 U/L Final     Anion Gap   Date Value Ref Range Status   06/25/2018 9 8 - 16 mmol/L Final     eGFR if    Date Value Ref Range Status   06/25/2018 >60.0 >60 mL/min/1.73 m^2 Final     eGFR if non    Date Value Ref Range Status   06/25/2018 53.3 (A) >60 mL/min/1.73 m^2 Final     Comment:     Calculation used to obtain the estimated glomerular filtration  rate (eGFR) is the CKD-EPI equation.        Lab Results   Component Value Date    EHXAZMCQ32 381 06/25/2018     Lab Results   Component Value Date    TSH 2.062 06/25/2018         2016    CTH Unremarkable     NCS/EMG PN     Assessment:       1. CSPN (cryptogenic sensory polyneuropathy)    2. Venous insufficiency    3. Memory difficulties        Plan:       CSPN      IPEP/SPEP.    Continue GBP    I will also prescribe compound cream.      MEMORY DIFFICULTIES    Use written reminders    Be very cautious with driving    Reassess in 6 months to consider CNP        RTC in 6 months

## 2018-10-19 LAB
ALBUMIN SERPL ELPH-MCNC: 3.65 G/DL
ALPHA1 GLOB SERPL ELPH-MCNC: 0.27 G/DL
ALPHA2 GLOB SERPL ELPH-MCNC: 0.74 G/DL
B-GLOBULIN SERPL ELPH-MCNC: 0.74 G/DL
GAMMA GLOB SERPL ELPH-MCNC: 0.81 G/DL
INTERPRETATION SERPL IFE-IMP: NORMAL
PATHOLOGIST INTERPRETATION IFE: NORMAL
PATHOLOGIST INTERPRETATION SPE: NORMAL
PROT SERPL-MCNC: 6.2 G/DL

## 2018-10-22 ENCOUNTER — TELEPHONE (OUTPATIENT)
Dept: NEUROLOGY | Facility: CLINIC | Age: 78
End: 2018-10-22

## 2018-10-22 NOTE — TELEPHONE ENCOUNTER
----- Message from Blaise Guerrero MD sent at 10/19/2018  3:01 PM CDT -----    10-    [facility]    SPEP/IPEP

## 2018-10-29 RX ORDER — IRBESARTAN 300 MG/1
TABLET ORAL
Qty: 90 TABLET | Refills: 0 | Status: SHIPPED | OUTPATIENT
Start: 2018-10-29 | End: 2019-06-03

## 2018-11-02 ENCOUNTER — PES CALL (OUTPATIENT)
Dept: ADMINISTRATIVE | Facility: CLINIC | Age: 78
End: 2018-11-02

## 2018-11-15 ENCOUNTER — PES CALL (OUTPATIENT)
Dept: ADMINISTRATIVE | Facility: CLINIC | Age: 78
End: 2018-11-15

## 2018-12-03 RX ORDER — GABAPENTIN 100 MG/1
CAPSULE ORAL
Qty: 90 CAPSULE | Refills: 0 | Status: SHIPPED | OUTPATIENT
Start: 2018-12-03 | End: 2018-12-19 | Stop reason: SDUPTHER

## 2018-12-19 ENCOUNTER — OFFICE VISIT (OUTPATIENT)
Dept: FAMILY MEDICINE | Facility: CLINIC | Age: 78
End: 2018-12-19
Payer: MEDICARE

## 2018-12-19 ENCOUNTER — OFFICE VISIT (OUTPATIENT)
Dept: DERMATOLOGY | Facility: CLINIC | Age: 78
End: 2018-12-19
Payer: MEDICARE

## 2018-12-19 VITALS
OXYGEN SATURATION: 98 % | TEMPERATURE: 98 F | BODY MASS INDEX: 26.36 KG/M2 | HEART RATE: 70 BPM | WEIGHT: 164 LBS | SYSTOLIC BLOOD PRESSURE: 124 MMHG | HEIGHT: 66 IN | DIASTOLIC BLOOD PRESSURE: 84 MMHG

## 2018-12-19 DIAGNOSIS — G57.93 NEUROPATHY OF BOTH FEET: ICD-10-CM

## 2018-12-19 DIAGNOSIS — I65.23 BILATERAL CAROTID ARTERY STENOSIS: ICD-10-CM

## 2018-12-19 DIAGNOSIS — N18.30 CKD (CHRONIC KIDNEY DISEASE) STAGE 3, GFR 30-59 ML/MIN: ICD-10-CM

## 2018-12-19 DIAGNOSIS — E78.5 DYSLIPIDEMIA: ICD-10-CM

## 2018-12-19 DIAGNOSIS — R79.9 ABNORMAL FINDING OF BLOOD CHEMISTRY: ICD-10-CM

## 2018-12-19 DIAGNOSIS — L72.0 MILIA: Primary | ICD-10-CM

## 2018-12-19 DIAGNOSIS — I10 HYPERTENSION, UNSPECIFIED TYPE: Primary | ICD-10-CM

## 2018-12-19 DIAGNOSIS — L82.1 SEBORRHEIC KERATOSES: ICD-10-CM

## 2018-12-19 PROCEDURE — 99214 OFFICE O/P EST MOD 30 MIN: CPT | Mod: HCNC,S$GLB,, | Performed by: FAMILY MEDICINE

## 2018-12-19 PROCEDURE — 3079F DIAST BP 80-89 MM HG: CPT | Mod: CPTII,HCNC,S$GLB, | Performed by: FAMILY MEDICINE

## 2018-12-19 PROCEDURE — 1101F PT FALLS ASSESS-DOCD LE1/YR: CPT | Mod: CPTII,HCNC,S$GLB, | Performed by: FAMILY MEDICINE

## 2018-12-19 PROCEDURE — 99999 PR PBB SHADOW E&M-EST. PATIENT-LVL II: CPT | Mod: PBBFAC,HCNC,, | Performed by: STUDENT IN AN ORGANIZED HEALTH CARE EDUCATION/TRAINING PROGRAM

## 2018-12-19 PROCEDURE — 3074F SYST BP LT 130 MM HG: CPT | Mod: CPTII,HCNC,S$GLB, | Performed by: FAMILY MEDICINE

## 2018-12-19 PROCEDURE — 99999 PR PBB SHADOW E&M-EST. PATIENT-LVL III: CPT | Mod: PBBFAC,HCNC,, | Performed by: FAMILY MEDICINE

## 2018-12-19 PROCEDURE — 99213 OFFICE O/P EST LOW 20 MIN: CPT | Mod: HCNC,S$GLB,, | Performed by: STUDENT IN AN ORGANIZED HEALTH CARE EDUCATION/TRAINING PROGRAM

## 2018-12-19 PROCEDURE — 1101F PT FALLS ASSESS-DOCD LE1/YR: CPT | Mod: CPTII,HCNC,S$GLB, | Performed by: STUDENT IN AN ORGANIZED HEALTH CARE EDUCATION/TRAINING PROGRAM

## 2018-12-19 RX ORDER — GABAPENTIN 100 MG/1
CAPSULE ORAL
Qty: 90 CAPSULE | Refills: 0 | Status: SHIPPED | OUTPATIENT
Start: 2018-12-19 | End: 2019-03-13 | Stop reason: SDUPTHER

## 2018-12-19 RX ORDER — TRETINOIN 0.25 MG/G
CREAM TOPICAL NIGHTLY
Qty: 45 G | Refills: 5 | Status: SHIPPED | OUTPATIENT
Start: 2018-12-19 | End: 2020-02-12

## 2018-12-19 NOTE — PROGRESS NOTES
Subjective:       Patient ID:  Shannon Solano is a 78 y.o. female who presents for   Chief Complaint   Patient presents with    Spot     c/o spots to face and both arms x 6 months tx otc lotion      History of Present Illness: The patient presents with chief complaint of scaly skin lesions. She was last seen on 1/5/17 where she had several some actinic keratosis treated with cryotherapy. Complains today of warty skin lesions, as well as large white heads around the nose.   Location: face and both arms   Duration:  6 months   Signs/Symptoms: dry patches   Prior treatments: otc lotion     Pt has no history of skin cancer           Review of Systems   Skin: Positive for dry skin. Negative for itching and rash.        Objective:    Physical Exam   Constitutional: She appears well-developed and well-nourished. No distress.   Neurological: She is alert and oriented to person, place, and time. She is not disoriented.   Psychiatric: She has a normal mood and affect.   Skin:   Areas Examined (abnormalities noted in diagram):   Head / Face Inspection Performed  Neck Inspection Performed  Chest / Axilla Inspection Performed  Back Inspection Performed  RUE Inspected  LUE Inspection Performed                   Diagram Legend     Erythematous scaling macule/papule c/w actinic keratosis       Vascular papule c/w angioma      Pigmented verrucoid papule/plaque c/w seborrheic keratosis      Yellow umbilicated papule c/w sebaceous hyperplasia      Irregularly shaped tan macule c/w lentigo     1-2 mm smooth white papules consistent with Milia      Movable subcutaneous cyst with punctum c/w epidermal inclusion cyst      Subcutaneous movable cyst c/w pilar cyst      Firm pink to brown papule c/w dermatofibroma      Pedunculated fleshy papule(s) c/w skin tag(s)      Evenly pigmented macule c/w junctional nevus     Mildly variegated pigmented, slightly irregular-bordered macule c/w mildly atypical nevus      Flesh colored to evenly  pigmented papule c/w intradermal nevus       Pink pearly papule/plaque c/w basal cell carcinoma      Erythematous hyperkeratotic cursted plaque c/w SCC      Surgical scar with no sign of skin cancer recurrence      Open and closed comedones      Inflammatory papules and pustules      Verrucoid papule consistent consistent with wart     Erythematous eczematous patches and plaques     Dystrophic onycholytic nail with subungual debris c/w onychomycosis     Umbilicated papule    Erythematous-base heme-crusted tan verrucoid plaque consistent with inflamed seborrheic keratosis     Erythematous Silvery Scaling Plaque c/w Psoriasis     See annotation      Assessment / Plan:        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   -     tretinoin (RETIN-A) 0.025 % cream; Apply topically every evening.  Dispense: 45 g; Refill: 5    Seborrheic keratoses  These are benign inherited growths without a malignant potential. Reassurance given to patient. No treatment is necessary.          Follow-up in about 6 months (around 6/19/2019).

## 2018-12-19 NOTE — PROGRESS NOTES
Subjective:       Patient ID: Shannon Solano is a 78 y.o. female.    Chief Complaint: Annual Exam    78 y old female with DLP , HTN with stage 3 ckd  , Carotid stenosis , hx of CVA, feet neuropathy her  for f.u . Doing well asides from Numbness in feet . Taking absenting 200 mg qhs. No cp , no sob . Compliant  with meds. No falls, confusion , blurry vision . Avpoids NSIADS      Review of Systems   Constitutional: Negative.    HENT: Negative.    Eyes: Negative.    Respiratory: Negative.    Cardiovascular: Negative.    Gastrointestinal: Negative.    Genitourinary: Negative.    Musculoskeletal: Negative.    Skin: Negative.    Hematological: Negative.        Objective:      Physical Exam   Constitutional: She is oriented to person, place, and time. She appears well-developed and well-nourished. No distress.   HENT:   Head: Normocephalic and atraumatic.   Right Ear: External ear normal.   Left Ear: External ear normal.   Mouth/Throat: No oropharyngeal exudate.   Eyes: Conjunctivae and EOM are normal. Pupils are equal, round, and reactive to light. Right eye exhibits no discharge. Left eye exhibits no discharge. No scleral icterus.   Neck: Normal range of motion. Neck supple. No JVD present. No tracheal deviation present. No thyromegaly present.   Cardiovascular: Normal rate, regular rhythm and normal heart sounds. Exam reveals no gallop and no friction rub.   No murmur heard.  Pulmonary/Chest: Effort normal and breath sounds normal. No stridor. No respiratory distress. She has no wheezes. She has no rales. She exhibits no tenderness.   Abdominal: Soft. Bowel sounds are normal. She exhibits no distension. There is no tenderness. There is no rebound and no guarding.   Musculoskeletal: Normal range of motion.   Lymphadenopathy:     She has no cervical adenopathy.   Neurological: She is alert and oriented to person, place, and time.   Skin: Skin is warm and dry. She is not diaphoretic.   Psychiatric: She has a normal mood  and affect. Her behavior is normal. Judgment and thought content normal.       Assessment:     Shannon was seen today for annual exam.    Diagnoses and all orders for this visit:    Hypertension, unspecified type    Dyslipidemia    Bilateral carotid artery stenosis    CKD (chronic kidney disease) stage 3, GFR 30-59 ml/min    Neuropathy of both feet    Other orders  -     gabapentin (NEURONTIN) 100 MG capsule; TAKE 3 CAPSULES(300 MG) BY MOUTH EVERY EVENING      Plan:   Controlled. Cont med   Labs   Stable . Cont aspirin and statin   Stable. Avoid NSAIDS  Increase Gabapentin to 300 mg . Caution with falls

## 2018-12-19 NOTE — PATIENT INSTRUCTIONS

## 2018-12-26 ENCOUNTER — TELEPHONE (OUTPATIENT)
Dept: DERMATOLOGY | Facility: CLINIC | Age: 78
End: 2018-12-26

## 2018-12-26 NOTE — TELEPHONE ENCOUNTER
PA initiated for Tretinoin Cream 0.025% via cover my meds.  Decision to be faxed over in 3-5 business days.

## 2019-01-02 ENCOUNTER — TELEPHONE (OUTPATIENT)
Dept: DERMATOLOGY | Facility: CLINIC | Age: 79
End: 2019-01-02

## 2019-01-02 NOTE — TELEPHONE ENCOUNTER
PA for Tretinoin approved by pt insurance via cover my meds.    Approval dates: 12/26/18 - 12/26/20    PA Case ID: [id number]    Pharmacy notified via fax.

## 2019-01-21 RX ORDER — IRBESARTAN 300 MG/1
TABLET ORAL
Qty: 90 TABLET | Refills: 0 | Status: SHIPPED | OUTPATIENT
Start: 2019-01-21 | End: 2019-06-03

## 2019-03-13 RX ORDER — GABAPENTIN 100 MG/1
CAPSULE ORAL
Qty: 90 CAPSULE | Refills: 0 | Status: SHIPPED | OUTPATIENT
Start: 2019-03-13 | End: 2019-07-22 | Stop reason: SDUPTHER

## 2019-03-13 NOTE — TELEPHONE ENCOUNTER
----- Message from Antonia Gutiérrez sent at 3/13/2019  1:48 PM CDT -----  Contact: wayne-daughter  Type:  RX Refill Request    Who Called: wayne-daughter  Refill or New Rx:refill  RX Name and Strength:gabapentin (NEURONTIN) 100 MG capsule  How is the patient currently taking it? (ex. 1XDay):n/a  Is this a 30 day or 90 day RX:n/a  Preferred Pharmacy with phone number:  [facility]/pharmacy #[id number] - [place] - [address] [facility]  [address] [facility] [address]  Phone: [phone] Fax: [phone]      Local or Mail Order:local  Ordering Provider:dr tolliver  Would the patient rather a call back or a response via [facility]? call  Best Call Back Number:[phone]  Additional Information: n/a

## 2019-03-14 NOTE — TELEPHONE ENCOUNTER
----- Message from Felipa Martinez sent at 3/14/2019  7:46 AM CDT -----  Type:  RX Refill Request    Who Called: Sussy/pt daughter   Refill or New Rx:refill   RX Name and Strength:gabapentin (NEURONTIN) 100 MG capsule  How is the patient currently taking it? (ex. 1XDay):  Is this a 30 day or 90 day RX: 30  Preferred Pharmacy with phone number:  [facility]/pharmacy #[id number] - [place], [place] - [address]  [address] [address]  Phone: [phone] Fax: [phone]  Local or Mail Order: Local   Ordering Provider: Dr. Esparza   Would the patient rather a call back or a response via [facility]? Call back   Best Call Back Number: [phone]  Additional Information:

## 2019-03-14 NOTE — TELEPHONE ENCOUNTER
Spoke with pt's daughter, Sussy, informed her that patients prescription was sent to [facility] on yesterday. She states that pt uses [facility]. Advised her to call [employer] and have it transferred. She verbalized understanding.

## 2019-03-21 ENCOUNTER — PES CALL (OUTPATIENT)
Dept: ADMINISTRATIVE | Facility: CLINIC | Age: 79
End: 2019-03-21

## 2019-06-03 ENCOUNTER — TELEPHONE (OUTPATIENT)
Dept: FAMILY MEDICINE | Facility: CLINIC | Age: 79
End: 2019-06-03

## 2019-06-03 RX ORDER — PRAVASTATIN SODIUM 40 MG/1
TABLET ORAL
Qty: 90 TABLET | Refills: 0 | Status: SHIPPED | OUTPATIENT
Start: 2019-06-03 | End: 2019-08-25 | Stop reason: SDUPTHER

## 2019-06-03 RX ORDER — IRBESARTAN 150 MG/1
300 TABLET ORAL NIGHTLY
Qty: 180 TABLET | Refills: 0 | Status: SHIPPED | OUTPATIENT
Start: 2019-06-03 | End: 2020-04-28 | Stop reason: DRUGHIGH

## 2019-06-03 NOTE — TELEPHONE ENCOUNTER
Irbesartan 300 mg is on back order. Pharmacy asking to change to Irbesartan 150 mg BID. Please advise?

## 2019-06-04 ENCOUNTER — TELEPHONE (OUTPATIENT)
Dept: FAMILY MEDICINE | Facility: CLINIC | Age: 79
End: 2019-06-04

## 2019-06-04 NOTE — TELEPHONE ENCOUNTER
Expedited appeal initiated at this time over the phone for the 150 mg tablet Irbesartan. Waiting on response from [facility].

## 2019-06-04 NOTE — TELEPHONE ENCOUNTER
PA initiated for Irbesartan 150 mg tablets. La at [facility] notified.

## 2019-06-04 NOTE — TELEPHONE ENCOUNTER
----- Message from Kanika Granados sent at 6/4/2019 12:03 PM CDT -----  Contact: [facility]  Type:  Pharmacy Calling to Clarify an RX    Name of Caller:La  Pharmacy Name:[facility]  Prescription Name:Irbesartan  What do they need to clarify?:dosage/PA  Best Call Back Number:[phone]  Additional Information: need PA to override script.

## 2019-06-04 NOTE — TELEPHONE ENCOUNTER
Spoke with La at [facility]. She states the Irbesartan 300 mg is on back order and not released to be back in until august. La tried calling the insurance to get the Irbesartan 150 mg two tablets every evening approved and the insurance stated the doctors office would have to complete a PA. Informed La we will call her and let her know when the PA has been initiated. Verbalized understanding.

## 2019-07-12 ENCOUNTER — PES CALL (OUTPATIENT)
Dept: ADMINISTRATIVE | Facility: CLINIC | Age: 79
End: 2019-07-12

## 2019-07-22 RX ORDER — GABAPENTIN 100 MG/1
CAPSULE ORAL
Qty: 90 CAPSULE | Refills: 0 | Status: SHIPPED | OUTPATIENT
Start: 2019-07-22 | End: 2019-08-15 | Stop reason: ALTCHOICE

## 2019-07-22 NOTE — TELEPHONE ENCOUNTER
----- Message from Luis Waterman sent at 7/22/2019  8:52 AM CDT -----  Contact: pt   Type:  RX Refill Request    Who Called: DAVID JARAMILLO   Refill or New Rx: refill   RX Name and Strength:  gabapentin  100 MG   How is the patient currently taking it? (ex. 1XDay): 3 cap at night   Is this a 30 day or 90 day RX: 90 day   Preferred Pharmacy with phone number:     [facility] [id number]    Local or Mail Order: local   Ordering Provider: Vilma  Would the patient rather a call back or a response via [facility]? Call   Best Call Back Number: [phone] (home)   Additional Information:

## 2019-08-15 ENCOUNTER — OFFICE VISIT (OUTPATIENT)
Dept: NEUROLOGY | Facility: CLINIC | Age: 79
End: 2019-08-15
Payer: MEDICARE

## 2019-08-15 VITALS
BODY MASS INDEX: 25.9 KG/M2 | HEIGHT: 66 IN | WEIGHT: 161.19 LBS | DIASTOLIC BLOOD PRESSURE: 77 MMHG | SYSTOLIC BLOOD PRESSURE: 137 MMHG | HEART RATE: 60 BPM

## 2019-08-15 DIAGNOSIS — L82.1 KERATOSIS, SEBORRHEIC: ICD-10-CM

## 2019-08-15 DIAGNOSIS — M19.019 ACROMIOCLAVICULAR JOINT ARTHRITIS, UNSPECIFIED LATERALITY: ICD-10-CM

## 2019-08-15 DIAGNOSIS — Z86.73 HISTORY OF STROKE: Chronic | ICD-10-CM

## 2019-08-15 DIAGNOSIS — M25.512 BILATERAL SHOULDER PAIN, UNSPECIFIED CHRONICITY: ICD-10-CM

## 2019-08-15 DIAGNOSIS — R41.3 MEMORY DIFFICULTIES: ICD-10-CM

## 2019-08-15 DIAGNOSIS — G57.93 NEUROPATHY OF BOTH FEET: ICD-10-CM

## 2019-08-15 DIAGNOSIS — N18.30 CKD (CHRONIC KIDNEY DISEASE) STAGE 3, GFR 30-59 ML/MIN: Chronic | ICD-10-CM

## 2019-08-15 DIAGNOSIS — M25.511 BILATERAL SHOULDER PAIN, UNSPECIFIED CHRONICITY: ICD-10-CM

## 2019-08-15 DIAGNOSIS — I87.2 VENOUS INSUFFICIENCY: Chronic | ICD-10-CM

## 2019-08-15 DIAGNOSIS — E78.5 DYSLIPIDEMIA: Chronic | ICD-10-CM

## 2019-08-15 DIAGNOSIS — R29.898 LEFT ARM WEAKNESS: ICD-10-CM

## 2019-08-15 DIAGNOSIS — I10 HYPERTENSION, UNSPECIFIED TYPE: Chronic | ICD-10-CM

## 2019-08-15 DIAGNOSIS — G60.8 CSPN (CRYPTOGENIC SENSORY POLYNEUROPATHY): Primary | ICD-10-CM

## 2019-08-15 DIAGNOSIS — I77.9 BILATERAL CAROTID ARTERY DISEASE, UNSPECIFIED TYPE: Chronic | ICD-10-CM

## 2019-08-15 DIAGNOSIS — R73.03 PRE-DIABETES: Chronic | ICD-10-CM

## 2019-08-15 PROCEDURE — 3075F SYST BP GE 130 - 139MM HG: CPT | Mod: HCNC,CPTII,S$GLB, | Performed by: PSYCHIATRY & NEUROLOGY

## 2019-08-15 PROCEDURE — 3078F DIAST BP <80 MM HG: CPT | Mod: HCNC,CPTII,S$GLB, | Performed by: PSYCHIATRY & NEUROLOGY

## 2019-08-15 PROCEDURE — 1101F PR PT FALLS ASSESS DOC 0-1 FALLS W/OUT INJ PAST YR: ICD-10-PCS | Mod: HCNC,CPTII,S$GLB, | Performed by: PSYCHIATRY & NEUROLOGY

## 2019-08-15 PROCEDURE — 3075F PR MOST RECENT SYSTOLIC BLOOD PRESS GE 130-139MM HG: ICD-10-PCS | Mod: HCNC,CPTII,S$GLB, | Performed by: PSYCHIATRY & NEUROLOGY

## 2019-08-15 PROCEDURE — 3078F PR MOST RECENT DIASTOLIC BLOOD PRESSURE < 80 MM HG: ICD-10-PCS | Mod: HCNC,CPTII,S$GLB, | Performed by: PSYCHIATRY & NEUROLOGY

## 2019-08-15 PROCEDURE — 99999 PR PBB SHADOW E&M-EST. PATIENT-LVL III: CPT | Mod: PBBFAC,HCNC,, | Performed by: PSYCHIATRY & NEUROLOGY

## 2019-08-15 PROCEDURE — 1101F PT FALLS ASSESS-DOCD LE1/YR: CPT | Mod: HCNC,CPTII,S$GLB, | Performed by: PSYCHIATRY & NEUROLOGY

## 2019-08-15 PROCEDURE — 99214 PR OFFICE/OUTPT VISIT, EST, LEVL IV, 30-39 MIN: ICD-10-PCS | Mod: HCNC,S$GLB,, | Performed by: PSYCHIATRY & NEUROLOGY

## 2019-08-15 PROCEDURE — 99999 PR PBB SHADOW E&M-EST. PATIENT-LVL III: ICD-10-PCS | Mod: PBBFAC,HCNC,, | Performed by: PSYCHIATRY & NEUROLOGY

## 2019-08-15 PROCEDURE — 99214 OFFICE O/P EST MOD 30 MIN: CPT | Mod: HCNC,S$GLB,, | Performed by: PSYCHIATRY & NEUROLOGY

## 2019-08-15 PROCEDURE — 99499 RISK ADDL DX/OHS AUDIT: ICD-10-PCS | Mod: HCNC,S$GLB,, | Performed by: PSYCHIATRY & NEUROLOGY

## 2019-08-15 PROCEDURE — 99499 UNLISTED E&M SERVICE: CPT | Mod: HCNC,S$GLB,, | Performed by: PSYCHIATRY & NEUROLOGY

## 2019-08-15 NOTE — PROGRESS NOTES
"Subjective:       Patient ID: Shannon Solano is a 79 y.o. female.    Chief Complaint: Numbness (pt c/o numbness in bilat foot)    HPI       BACKGROUND HISTORY      The patient is accompanied by her daughter who said that she is here for numbness in her feet. The patient started complaining of bilateral symmetric feet paroxysmal tingling and burning pain about 2 years ago.  The patient usually notices the pain after significant physical activity or prolonged sitting or standing.  The pain is paroxysmal and not persistent  The pain and tingling involves the whole feet, mainly the sole area and progresses to the very distal part of the legs.  The patient denies any hand symptoms. No ankle or foot weakness. The patient denies any low back with radiation to the legs.  The patient has good control on her bowel and bladder. No history of DM. No known history of thyroid disease. No history of malignancies.  No history of toxic environmental exposure.  No history of autoimmune disease. No history of excessive alcohol or recreational drug abuse. No history of chronic hepatitis or HIV. No history of malabsorption or nutritional deficiencies. In  B12, FA, TFT are NL. NCS/EMG in 2016 showed PN. She is on  mg TID which seems to be helpful. I ordered SPEP/IPEP, Compound cream.     Reviewed the records and there was some concern about her memory. The patient describes some memory lapses especially with "to-do-list" but otherwise fully independent and functions at high level. In  B12, FA, TFT are NL. RPR is -ve. CTH in 2016 is unremarkable and CTH was ordered and pending. No behavioral problems. Recommended monitoring and consider CNP Testing.         INTERVAL HISTORY     10- Labs NL SPEP/IPEP. Now she saying that she never had pain! Takes  mg QHS for numbness!!    No memory concerns. Doing well.       Review of Systems   Constitutional: Negative for appetite change and fatigue.   HENT: Negative " for hearing loss and tinnitus.    Eyes: Negative for photophobia and visual disturbance.   Respiratory: Negative for apnea and shortness of breath.    Cardiovascular: Negative for chest pain and palpitations.   Gastrointestinal: Negative for nausea and vomiting.   Endocrine: Negative for cold intolerance and heat intolerance.   Genitourinary: Negative for difficulty urinating and urgency.   Musculoskeletal: Negative for arthralgias, back pain, gait problem, joint swelling, myalgias, neck pain and neck stiffness.   Skin: Negative for color change and rash.   Allergic/Immunologic: Negative for environmental allergies and immunocompromised state.   Neurological: Positive for numbness. Negative for dizziness, tremors, seizures, syncope, facial asymmetry, speech difficulty, weakness, light-headedness and headaches.   Hematological: Negative for adenopathy. Does not bruise/bleed easily.   Psychiatric/Behavioral: Negative for agitation, behavioral problems, confusion, decreased concentration, dysphoric mood, hallucinations, self-injury, sleep disturbance and suicidal ideas. The patient is not hyperactive.          Current Outpatient Medications:     aspirin (ECOTRIN) 81 MG EC tablet, Take 81 mg by mouth once daily., Disp: , Rfl:     gabapentin (NEURONTIN) 100 MG capsule, TAKE 3 CAPSULES(300 MG) BY MOUTH EVERY EVENING, Disp: 90 capsule, Rfl: 0    irbesartan (AVAPRO) 150 MG tablet, Take 2 tablets (300 mg total) by mouth every evening., Disp: 180 tablet, Rfl: 0    multivitamin (ONE DAILY MULTIVITAMIN) per tablet, Take 1 tablet by mouth once daily., Disp: , Rfl:     pravastatin (PRAVACHOL) 40 MG tablet, TAKE 1 TABLET BY MOUTH EVERY DAY, Disp: 90 tablet, Rfl: 0    tretinoin (RETIN-A) 0.025 % cream, Apply topically every evening., Disp: 45 g, Rfl: 5  Past Medical History:   Diagnosis Date    Anemia     Arthritis     Cataract     Disorder of kidney and ureter     Hyperlipidemia     Hypertension     Osteoarthritis      Polyneuropathy     Stroke      Past Surgical History:   Procedure Laterality Date    APPENDECTOMY      CATARACT EXTRACTION Bilateral 2014    EYE SURGERY Bilateral     HYSTERECTOMY      VARICOSE VEIN SURGERY       Social History     Socioeconomic History    Marital status:      Spouse name: Not on file    Number of children: 2    Years of education: Not on file    Highest education level: Not on file   Occupational History    Not on file   Social Needs    Financial resource strain: Not on file    Food insecurity:     Worry: Not on file     Inability: Not on file    Transportation needs:     Medical: Not on file     Non-medical: Not on file   Tobacco Use    Smoking status: Never Smoker    Smokeless tobacco: Never Used   Substance and Sexual Activity    Alcohol use: No    Drug use: No    Sexual activity: Not Currently   Lifestyle    Physical activity:     Days per week: Not on file     Minutes per session: Not on file    Stress: Not on file   Relationships    Social connections:     Talks on phone: Not on file     Gets together: Not on file     Attends [employer] service: Not on file     Active member of club or organization: Not on file     Attends meetings of clubs or organizations: Not on file     Relationship status: Not on file   Other Topics Concern    Are you pregnant or think you may be? No    Breast-feeding No   Social History Narrative    Not on file       Objective:     GENERAL APPEARANCE:     The patient looks comfortable.    No signs of medical or psychiatric distress.    Normal breathing pattern.    No dysmorphic features    Normal eye contact.     GENERAL MEDICAL EXAM:    HEENT:  Head is atraumatic normocephalic.     Neck and Axillae: No JVD.    Cardiopulmonary: No cyanosis. No tachypnea. Normal respiratory effort.    Gastrointestinal:  No stomas or lesions. No hernias.    Skin, Hair and Nails: No pathognonomic skin rash. No neurofibromatosis. No stigmata of autoimmune  disease.     Limbs: Varicose veins. No edema.     Muskoskeletal: No deformities.No signs of longstanding neuropathy. No dislocations or fractures.        Neurologic Exam     Mental Status   Oriented to person, place, and time.   Registration: recalls 3 of 3 objects. Recall at 5 minutes: recalls 3 of 3 objects. Follows 3 step commands.   Attention: normal. Concentration: normal.   Speech: speech is normal   Level of consciousness: alert  Knowledge: good and consistent with education. Able to perform simple calculations.   Able to name object. Able to read. Able to repeat. Able to write. Normal comprehension.     MOCA 30    Visuospatial/Executive  5   Naming                          3  Attention                        6  Language                      3  Abstraction                    2  Recall                            5  Orientation                     6       Cranial Nerves     CN II   Visual fields full to confrontation.   Visual acuity: normal  Right visual field deficit: none  Left visual field deficit: none     CN III, IV, VI   Pupils are equal, round, and reactive to light.  Extraocular motions are normal.   Right pupil: Size: 2 mm. Shape: regular. Reactivity: brisk. Consensual response: intact. Accommodation: intact.   Left pupil: Size: 2 mm. Shape: regular. Reactivity: brisk. Consensual response: intact. Accommodation: intact.   CN III: no CN III palsy  CN VI: no CN VI palsy  Nystagmus: none   Diplopia: none  Ophthalmoparesis: none  Upgaze: normal  Downgaze: normal  Conjugate gaze: present  Vestibulo-ocular reflex: present    CN V   Facial sensation intact.   Right facial sensation deficit: none  Left facial sensation deficit: none  Right corneal reflex: normal  Left corneal reflex: normal    CN VII   Right facial weakness: none  Left facial weakness: none  Right taste: normal  Left taste: normal    CN VIII   CN VIII normal.   Hearing: intact  Right Rinne: AC > BC  Left Rinne: AC > BC  Guzmán: does not  lateralize     CN IX, X   CN IX normal.   CN X normal.   Palate: symmetric  Right gag reflex: normal  Left gag reflex: normal    CN XI   CN XI normal.   Right sternocleidomastoid strength: normal  Left sternocleidomastoid strength: normal  Right trapezius strength: normal  Left trapezius strength: normal    CN XII   CN XII normal.   Tongue: not atrophic  Fasciculations: absent  Tongue deviation: none    Motor Exam   Muscle bulk: normal  Overall muscle tone: normal  Right arm tone: normal  Left arm tone: normal  Right arm pronator drift: absent  Left arm pronator drift: absent  Right leg tone: normal  Left leg tone: normal    Strength   Strength 5/5 throughout.   Right neck flexion: 5/5  Left neck flexion: 5/5  Right neck extension: 5/5  Left neck extension: 5/5  Right deltoid: 5/5  Left deltoid: 5/5  Right biceps: 5/5  Left biceps: 5/5  Right triceps: 5/5  Left triceps: 5/5  Right wrist flexion: 5/5  Left wrist flexion: 5/5  Right wrist extension: 5/5  Left wrist extension: 5/5  Right interossei: 5/5  Left interossei: 5/5  Right abdominals: 5/5  Left abdominals: 5/5  Right iliopsoas: 5/5  Left iliopsoas: 5/5  Right quadriceps: 5/5  Left quadriceps: 5/5  Right hamstrin/5  Left hamstrin/5  Right glutei: 5/5  Left glutei: 5/5  Right anterior tibial: 5/5  Left anterior tibial: 5/5  Right posterior tibial: 5/5  Left posterior tibial: 5/5  Right peroneal: 5/5  Left peroneal: 5/5  Right gastroc: 5/5  Left gastroc: 5/5    Sensory Exam   Right arm light touch: decreased from fingers  Left arm light touch: decreased from fingers  Right leg light touch: decreased from toes  Left leg light touch: decreased from toes  Right arm vibration: normal  Left arm vibration: normal  Right leg vibration: decreased from toes  Left leg vibration: decreased from toes  Right arm proprioception: normal  Left arm proprioception: normal  Right leg proprioception: decreased from toes  Left leg proprioception: decreased from toes  Right  arm pinprick: decreased from fingers  Left arm pinprick: decreased from fingers  Right leg pinprick: decreased from toes  Left leg pinprick: decreased from toes  Graphesthesia: normal  Stereognosis: normal    Gait, Coordination, and Reflexes     Gait  Gait: wide-based    Coordination   Romberg: positive  Finger to nose coordination: normal  Heel to shin coordination: normal    Tremor   Resting tremor: absent  Intention tremor: absent  Action tremor: absent    Reflexes   Right brachioradialis: 2+  Left brachioradialis: 2+  Right biceps: 2+  Left biceps: 2+  Right triceps: 2+  Left triceps: 2+  Right patellar: 1+  Left patellar: 1+  Right achilles: 0  Left achilles: 0  Right plantar: normal  Left plantar: normal  Right Ochoa: absent  Left Ochoa: absent  Right ankle clonus: absent  Left ankle clonus: absent  Right pendular knee jerk: absent  Left pendular knee jerk: absent      Lab Results   Component Value Date    WBC 9.39 06/25/2018    HGB 13.1 06/25/2018    HCT 41.5 06/25/2018    MCV 96 06/25/2018     06/25/2018     Sodium   Date Value Ref Range Status   06/25/2018 142 136 - 145 mmol/L Final     Potassium   Date Value Ref Range Status   06/25/2018 3.8 3.5 - 5.1 mmol/L Final     Chloride   Date Value Ref Range Status   06/25/2018 106 95 - 110 mmol/L Final     CO2   Date Value Ref Range Status   06/25/2018 27 23 - 29 mmol/L Final     Glucose   Date Value Ref Range Status   06/25/2018 59 (L) 70 - 110 mg/dL Final     BUN, Bld   Date Value Ref Range Status   06/25/2018 22 8 - 23 mg/dL Final     Creatinine   Date Value Ref Range Status   06/25/2018 1.0 0.5 - 1.4 mg/dL Final     Calcium   Date Value Ref Range Status   06/25/2018 9.3 8.7 - 10.5 mg/dL Final     Total Protein   Date Value Ref Range Status   06/25/2018 7.1 6.0 - 8.4 g/dL Final     Albumin   Date Value Ref Range Status   06/25/2018 3.8 3.5 - 5.2 g/dL Final     Total Bilirubin   Date Value Ref Range Status   06/25/2018 0.5 0.1 - 1.0 mg/dL Final      Comment:     For infants and newborns, interpretation of results should be based  on gestational age, weight and in agreement with clinical  observations.  Premature Infant recommended reference ranges:  Up to 24 hours.............<8.0 mg/dL  Up to 48 hours............<12.0 mg/dL  3-5 days..................<15.0 mg/dL  6-29 days.................<15.0 mg/dL       Alkaline Phosphatase   Date Value Ref Range Status   06/25/2018 96 55 - 135 U/L Final     AST   Date Value Ref Range Status   06/25/2018 22 10 - 40 U/L Final     ALT   Date Value Ref Range Status   06/25/2018 21 10 - 44 U/L Final     Anion Gap   Date Value Ref Range Status   06/25/2018 9 8 - 16 mmol/L Final     eGFR if    Date Value Ref Range Status   06/25/2018 >60.0 >60 mL/min/1.73 m^2 Final     eGFR if non    Date Value Ref Range Status   06/25/2018 53.3 (A) >60 mL/min/1.73 m^2 Final     Comment:     Calculation used to obtain the estimated glomerular filtration  rate (eGFR) is the CKD-EPI equation.        Lab Results   Component Value Date    [id number] 381 06/25/2018     Lab Results   Component Value Date    TSH 2.062 06/25/2018     2016    CTH Unremarkable     NCS/EMG PN     10-     Labs NL SPEP/IPEP.       Assessment:       1. Dyslipidemia    2. Hypertension, unspecified type    3. History of stroke 1/16/13 Rt basal ganglia    4. Venous insufficiency    5. CKD (chronic kidney disease) stage 3, GFR 30-59 ml/min    6. Pre-diabetes    7. Bilateral carotid artery disease, unspecified type    8. Keratosis, seborrheic    9. Acromioclavicular joint arthritis, unspecified laterality    10. Bilateral shoulder pain, unspecified chronicity    11. Left arm weakness    12. Neuropathy of both feet    13. CSPN (cryptogenic sensory polyneuropathy)    14. Memory difficulties        Plan:       CSPN      Taper GBP off. It is meant for PAIN and not for numbness.    Avoid driving     Test water with an unaffected hand or  foot.    Use caution when trimming nails. File sharp areas    Wear shoes that fit well to avoid pressure points, blisters, and ulcers.    Inspect your hands and feet carefully (including the soles of your feet and between your toes) at least once a week.    Monitor clinically       MEMORY DIFFICULTIES    Use written reminders    Good sleep hygiene     Monitor clinically             RTC PRN

## 2019-08-15 NOTE — PATIENT INSTRUCTIONS
Peripheral Neuropathy  Peripheral neuropathy is a condition that affects the nerves of the arms or legs. It causes a change in physical feeling. Sometimes it causes weakness in the muscles. You may feel tingling, numbness or shooting pains. Symptoms may be more common at night. Skin may be extra sensitive to light touch or temperature changes.  Neuropathy may be a complication of a chronic disease such as diabetes. A ruptured disk with pressure on the spinal nerve may also lead to the problem. Certain vitamin deficiencies may lead to it. It may also be caused by exposure to certain drugs or chemicals.  Home care  · Tell the healthcare provider about all medicines you take. This includes prescription and over-the-counter medicines, vitamins, and herbs. Ask if any of the medicines may be causing your problems. Do not make any changes to prescription medicines without talking to your healthcare provider first.  · You may be prescribed medicines to help relieve the tingling feeling or for pain. Take all medicines as directed.  · A numb hand or foot may be more prone to injury. To help protect it:  ¨ Always use oven mitts.  ¨ Test water with an unaffected hand or foot.  ¨ Use caution when trimming nails. File sharp areas.  ¨ Wear shoes that fit well to avoid pressure points, blisters, and ulcers.  ¨ Inspect your hands and feet carefully (including the soles of your feet and between your toes) at least once a week. If you see red areas, sores, or other problems, tell your healthcare provider.  Follow-up care  Follow up with your doctor or as advised by our staff. You may need further testing or evaluation.  When to seek medical advice  Call your healthcare provider right away if any of the following occur:  · Redness, swelling, cracking, or ulcer on any numb area, especially the feet  · New symptoms of numbness or muscle weakness numbness  · Loss of bowel or bladder control  · Slurred speech, confusion, or trouble  speaking, walking, or seeing  Date Last Reviewed: 9/26/2015  © 2997-5097 [employer]. [address] [place] [address], [place] [address]. All rights reserved. This information is not intended as a substitute for professional medical care. Always follow your healthcare professional's instructions.

## 2019-08-26 RX ORDER — PRAVASTATIN SODIUM 40 MG/1
TABLET ORAL
Qty: 90 TABLET | Refills: 0 | Status: ON HOLD | OUTPATIENT
Start: 2019-08-26 | End: 2019-09-13 | Stop reason: HOSPADM

## 2019-08-28 ENCOUNTER — OFFICE VISIT (OUTPATIENT)
Dept: NEUROLOGY | Facility: CLINIC | Age: 79
End: 2019-08-28
Payer: MEDICARE

## 2019-08-28 VITALS
HEIGHT: 66 IN | BODY MASS INDEX: 25.47 KG/M2 | SYSTOLIC BLOOD PRESSURE: 110 MMHG | HEART RATE: 72 BPM | WEIGHT: 158.5 LBS | RESPIRATION RATE: 16 BRPM | DIASTOLIC BLOOD PRESSURE: 70 MMHG

## 2019-08-28 DIAGNOSIS — M25.512 BILATERAL SHOULDER PAIN, UNSPECIFIED CHRONICITY: ICD-10-CM

## 2019-08-28 DIAGNOSIS — L82.1 KERATOSIS, SEBORRHEIC: ICD-10-CM

## 2019-08-28 DIAGNOSIS — I10 HYPERTENSION, UNSPECIFIED TYPE: Chronic | ICD-10-CM

## 2019-08-28 DIAGNOSIS — M25.511 BILATERAL SHOULDER PAIN, UNSPECIFIED CHRONICITY: ICD-10-CM

## 2019-08-28 DIAGNOSIS — I77.9 BILATERAL CAROTID ARTERY DISEASE, UNSPECIFIED TYPE: Chronic | ICD-10-CM

## 2019-08-28 DIAGNOSIS — E78.5 DYSLIPIDEMIA: Chronic | ICD-10-CM

## 2019-08-28 DIAGNOSIS — R41.3 MEMORY DIFFICULTIES: ICD-10-CM

## 2019-08-28 DIAGNOSIS — I87.2 VENOUS INSUFFICIENCY: Chronic | ICD-10-CM

## 2019-08-28 DIAGNOSIS — N18.30 CKD (CHRONIC KIDNEY DISEASE) STAGE 3, GFR 30-59 ML/MIN: Chronic | ICD-10-CM

## 2019-08-28 DIAGNOSIS — G31.84 MILD NEUROCOGNITIVE DISORDER: Primary | ICD-10-CM

## 2019-08-28 DIAGNOSIS — G47.00 INSOMNIA, UNSPECIFIED TYPE: ICD-10-CM

## 2019-08-28 DIAGNOSIS — G60.8 CSPN (CRYPTOGENIC SENSORY POLYNEUROPATHY): ICD-10-CM

## 2019-08-28 DIAGNOSIS — R73.03 PRE-DIABETES: Chronic | ICD-10-CM

## 2019-08-28 DIAGNOSIS — Z86.73 HISTORY OF STROKE: Chronic | ICD-10-CM

## 2019-08-28 DIAGNOSIS — R29.898 LEFT ARM WEAKNESS: ICD-10-CM

## 2019-08-28 DIAGNOSIS — M19.019 ACROMIOCLAVICULAR JOINT ARTHRITIS, UNSPECIFIED LATERALITY: ICD-10-CM

## 2019-08-28 DIAGNOSIS — G57.93 NEUROPATHY OF BOTH FEET: ICD-10-CM

## 2019-08-28 PROCEDURE — 99999 PR PBB SHADOW E&M-EST. PATIENT-LVL IV: ICD-10-PCS | Mod: PBBFAC,HCNC,, | Performed by: PSYCHIATRY & NEUROLOGY

## 2019-08-28 PROCEDURE — 99499 RISK ADDL DX/OHS AUDIT: ICD-10-PCS | Mod: HCNC,S$GLB,, | Performed by: PSYCHIATRY & NEUROLOGY

## 2019-08-28 PROCEDURE — 3078F DIAST BP <80 MM HG: CPT | Mod: HCNC,CPTII,S$GLB, | Performed by: PSYCHIATRY & NEUROLOGY

## 2019-08-28 PROCEDURE — 3074F PR MOST RECENT SYSTOLIC BLOOD PRESSURE < 130 MM HG: ICD-10-PCS | Mod: HCNC,CPTII,S$GLB, | Performed by: PSYCHIATRY & NEUROLOGY

## 2019-08-28 PROCEDURE — 99499 UNLISTED E&M SERVICE: CPT | Mod: HCNC,S$GLB,, | Performed by: PSYCHIATRY & NEUROLOGY

## 2019-08-28 PROCEDURE — 99999 PR PBB SHADOW E&M-EST. PATIENT-LVL IV: CPT | Mod: PBBFAC,HCNC,, | Performed by: PSYCHIATRY & NEUROLOGY

## 2019-08-28 PROCEDURE — 1101F PR PT FALLS ASSESS DOC 0-1 FALLS W/OUT INJ PAST YR: ICD-10-PCS | Mod: HCNC,CPTII,S$GLB, | Performed by: PSYCHIATRY & NEUROLOGY

## 2019-08-28 PROCEDURE — 3074F SYST BP LT 130 MM HG: CPT | Mod: HCNC,CPTII,S$GLB, | Performed by: PSYCHIATRY & NEUROLOGY

## 2019-08-28 PROCEDURE — 3078F PR MOST RECENT DIASTOLIC BLOOD PRESSURE < 80 MM HG: ICD-10-PCS | Mod: HCNC,CPTII,S$GLB, | Performed by: PSYCHIATRY & NEUROLOGY

## 2019-08-28 PROCEDURE — 99215 OFFICE O/P EST HI 40 MIN: CPT | Mod: HCNC,S$GLB,, | Performed by: PSYCHIATRY & NEUROLOGY

## 2019-08-28 PROCEDURE — 1101F PT FALLS ASSESS-DOCD LE1/YR: CPT | Mod: HCNC,CPTII,S$GLB, | Performed by: PSYCHIATRY & NEUROLOGY

## 2019-08-28 PROCEDURE — 99215 PR OFFICE/OUTPT VISIT, EST, LEVL V, 40-54 MIN: ICD-10-PCS | Mod: HCNC,S$GLB,, | Performed by: PSYCHIATRY & NEUROLOGY

## 2019-08-28 RX ORDER — MEMANTINE HYDROCHLORIDE 10 MG/1
10 TABLET ORAL 2 TIMES DAILY
Qty: 60 TABLET | Refills: 5 | Status: SHIPPED | OUTPATIENT
Start: 2019-08-28 | End: 2020-02-17 | Stop reason: SDUPTHER

## 2019-08-28 RX ORDER — TRAZODONE HYDROCHLORIDE 50 MG/1
50 TABLET ORAL NIGHTLY
Qty: 30 TABLET | Refills: 5 | Status: SHIPPED | OUTPATIENT
Start: 2019-08-28 | End: 2020-02-12

## 2019-08-28 NOTE — PROGRESS NOTES
"Subjective:       Patient ID: Shannon Solano is a 79 y.o. female.    Chief Complaint: Memory Loss    HPI       BACKGROUND HISTORY      The patient is accompanied by her daughter who said that she is here for numbness in her feet. The patient started complaining of bilateral symmetric feet paroxysmal tingling and burning pain about 2 years ago.  The patient usually notices the pain after significant physical activity or prolonged sitting or standing.  The pain is paroxysmal and not persistent  The pain and tingling involves the whole feet, mainly the sole area and progresses to the very distal part of the legs.  The patient denies any hand symptoms. No ankle or foot weakness. The patient denies any low back with radiation to the legs.  The patient has good control on her bowel and bladder. No history of DM. No known history of thyroid disease. No history of malignancies.  No history of toxic environmental exposure.  No history of autoimmune disease. No history of excessive alcohol or recreational drug abuse. No history of chronic hepatitis or HIV. No history of malabsorption or nutritional deficiencies. In  B12, FA, TFT are NL. NCS/EMG in 2016 showed PN. She is on  mg TID which seems to be helpful. I ordered SPEP/IPEP, Compound cream. 10- Labs NL SPEP/IPEP. Now she saying that she never had pain! Takes  mg QHS for numbness!! I stopped GBP.    Reviewed the records and there was some concern about her memory. The patient describes some memory lapses especially with "to-do-list" but otherwise fully independent and functions at high level. In  B12, FA, TFT are NL. RPR is -ve. CTH in 2016 is unremarkable and CTH was ordered and pending. No behavioral problems. Recommended monitoring and consider CNP Testing.         INTERVAL HISTORY     Off GBP. Has BLE numbness with no pain.    The daughter is worried about the patient's memory. The patient remains independent and highly functioning but " has episodes of memory lapses and confusion that concerned the daughter like going to the mailbox and not knowing why she was there, forgetting that she went shopping.The patient is also having severe insomnia.      Review of Systems   Constitutional: Negative for appetite change and fatigue.   HENT: Negative for hearing loss and tinnitus.    Eyes: Negative for photophobia and visual disturbance.   Respiratory: Negative for apnea and shortness of breath.    Cardiovascular: Negative for chest pain and palpitations.   Gastrointestinal: Negative for nausea and vomiting.   Endocrine: Negative for cold intolerance and heat intolerance.   Genitourinary: Negative for difficulty urinating and urgency.   Musculoskeletal: Negative for arthralgias, back pain, gait problem, joint swelling, myalgias, neck pain and neck stiffness.   Skin: Negative for color change and rash.   Allergic/Immunologic: Negative for environmental allergies and immunocompromised state.   Neurological: Positive for numbness. Negative for dizziness, tremors, seizures, syncope, facial asymmetry, speech difficulty, weakness, light-headedness and headaches.   Hematological: Negative for adenopathy. Does not bruise/bleed easily.   Psychiatric/Behavioral: Negative for agitation, behavioral problems, confusion, decreased concentration, dysphoric mood, hallucinations, self-injury, sleep disturbance and suicidal ideas. The patient is not hyperactive.          Current Outpatient Medications:     aspirin (ECOTRIN) 81 MG EC tablet, Take 81 mg by mouth once daily., Disp: , Rfl:     irbesartan (AVAPRO) 150 MG tablet, Take 2 tablets (300 mg total) by mouth every evening., Disp: 180 tablet, Rfl: 0    pravastatin (PRAVACHOL) 40 MG tablet, TAKE 1 TABLET BY MOUTH EVERY DAY, Disp: 90 tablet, Rfl: 0    memantine (NAMENDA) 10 MG Tab, Take 1 tablet (10 mg total) by mouth 2 (two) times daily., Disp: 60 tablet, Rfl: 5    multivitamin (ONE DAILY MULTIVITAMIN) per tablet,  Take 1 tablet by mouth once daily., Disp: , Rfl:     traZODone (DESYREL) 50 MG tablet, Take 1 tablet (50 mg total) by mouth every evening., Disp: 30 tablet, Rfl: 5    tretinoin (RETIN-A) 0.025 % cream, Apply topically every evening., Disp: 45 g, Rfl: 5  Past Medical History:   Diagnosis Date    Anemia     Arthritis     Cataract     Disorder of kidney and ureter     Hyperlipidemia     Hypertension     Insomnia 8/28/2019    Osteoarthritis     Polyneuropathy     Stroke      Past Surgical History:   Procedure Laterality Date    APPENDECTOMY      CATARACT EXTRACTION Bilateral 2014    EYE SURGERY Bilateral     HYSTERECTOMY      VARICOSE VEIN SURGERY       Social History     Socioeconomic History    Marital status:      Spouse name: Not on file    Number of children: 2    Years of education: Not on file    Highest education level: Not on file   Occupational History    Not on file   Social Needs    Financial resource strain: Not on file    Food insecurity:     Worry: Not on file     Inability: Not on file    Transportation needs:     Medical: Not on file     Non-medical: Not on file   Tobacco Use    Smoking status: Never Smoker    Smokeless tobacco: Never Used   Substance and Sexual Activity    Alcohol use: No    Drug use: No    Sexual activity: Not Currently   Lifestyle    Physical activity:     Days per week: Not on file     Minutes per session: Not on file    Stress: Not on file   Relationships    Social connections:     Talks on phone: Not on file     Gets together: Not on file     Attends L[employer] day service: Not on file     Active member of club or organization: Not on file     Attends meetings of clubs or organizations: Not on file     Relationship status: Not on file   Other Topics Concern    Are you pregnant or think you may be? No    Breast-feeding No   Social History Narrative    Not on file       Objective:     GENERAL APPEARANCE:     The patient looks comfortable.    No  signs of medical or psychiatric distress.    Normal breathing pattern.    No dysmorphic features    Normal eye contact.     GENERAL MEDICAL EXAM:    HEENT:  Head is atraumatic normocephalic.     Neck and Axillae: No JVD.    Cardiopulmonary: No cyanosis. No tachypnea. Normal respiratory effort.    Gastrointestinal:  No stomas or lesions. No hernias.    Skin, Hair and Nails: No pathognonomic skin rash. No neurofibromatosis. No stigmata of autoimmune disease.     Limbs: Varicose veins. No edema.     Muskoskeletal: No deformities.No signs of longstanding neuropathy. No dislocations or fractures.        Neurologic Exam     Mental Status   Oriented to person, place, and time.   Registration: recalls 3 of 3 objects. Recall at 5 minutes: recalls 3 of 3 objects. Follows 3 step commands.   Attention: normal. Concentration: normal.   Speech: speech is normal   Level of consciousness: alert  Knowledge: good and consistent with education. Able to perform simple calculations.   Able to name object. Able to read. Able to repeat. Able to write. Normal comprehension.     MOCA 30>30    Visuospatial/Executive  5   Naming                          3  Attention                        6  Language                      3  Abstraction                    2  Recall                            5  Orientation                     6       Cranial Nerves     CN II   Visual fields full to confrontation.   Visual acuity: normal  Right visual field deficit: none  Left visual field deficit: none     CN III, IV, VI   Pupils are equal, round, and reactive to light.  Extraocular motions are normal.   Right pupil: Size: 2 mm. Shape: regular. Reactivity: brisk. Consensual response: intact. Accommodation: intact.   Left pupil: Size: 2 mm. Shape: regular. Reactivity: brisk. Consensual response: intact. Accommodation: intact.   CN III: no CN III palsy  CN VI: no CN VI palsy  Nystagmus: none   Diplopia: none  Ophthalmoparesis: none  Upgaze: normal  Downgaze:  normal  Conjugate gaze: present  Vestibulo-ocular reflex: present    CN V   Facial sensation intact.   Right facial sensation deficit: none  Left facial sensation deficit: none  Right corneal reflex: normal  Left corneal reflex: normal    CN VII   Right facial weakness: none  Left facial weakness: none  Right taste: normal  Left taste: normal    CN VIII   CN VIII normal.   Hearing: intact  Right Rinne: AC > BC  Left Rinne: AC > BC  Guzmán: does not lateralize     CN IX, X   CN IX normal.   CN X normal.   Palate: symmetric  Right gag reflex: normal  Left gag reflex: normal    CN XI   CN XI normal.   Right sternocleidomastoid strength: normal  Left sternocleidomastoid strength: normal  Right trapezius strength: normal  Left trapezius strength: normal    CN XII   CN XII normal.   Tongue: not atrophic  Fasciculations: absent  Tongue deviation: none    Motor Exam   Muscle bulk: normal  Overall muscle tone: normal  Right arm tone: normal  Left arm tone: normal  Right arm pronator drift: absent  Left arm pronator drift: absent  Right leg tone: normal  Left leg tone: normal    Strength   Strength 5/5 throughout.   Right neck flexion: 5/5  Left neck flexion: 5/5  Right neck extension: 5/5  Left neck extension: 5/5  Right deltoid: 5/5  Left deltoid: 5/5  Right biceps: 5/5  Left biceps: 5/5  Right triceps: 5/5  Left triceps: 5/5  Right wrist flexion: 5/5  Left wrist flexion: 5/5  Right wrist extension: 5/5  Left wrist extension: 5/5  Right interossei: 5/5  Left interossei: 5/5  Right abdominals: 5/5  Left abdominals: 5/5  Right iliopsoas: 5/5  Left iliopsoas: 5/5  Right quadriceps: 5/5  Left quadriceps: 5/5  Right hamstrin/5  Left hamstrin/5  Right glutei: 5/5  Left glutei: 5/5  Right anterior tibial: 5/5  Left anterior tibial: 5/5  Right posterior tibial: 5/5  Left posterior tibial: 5/5  Right peroneal: 5/5  Left peroneal: 5/5  Right gastroc: 5/5  Left gastroc: 5/5    Sensory Exam   Right arm light touch: decreased  from fingers  Left arm light touch: decreased from fingers  Right leg light touch: decreased from toes  Left leg light touch: decreased from toes  Right arm vibration: normal  Left arm vibration: normal  Right leg vibration: decreased from toes  Left leg vibration: decreased from toes  Right arm proprioception: normal  Left arm proprioception: normal  Right leg proprioception: decreased from toes  Left leg proprioception: decreased from toes  Right arm pinprick: decreased from fingers  Left arm pinprick: decreased from fingers  Right leg pinprick: decreased from toes  Left leg pinprick: decreased from toes  Graphesthesia: normal  Stereognosis: normal    Gait, Coordination, and Reflexes     Gait  Gait: wide-based    Coordination   Romberg: positive  Finger to nose coordination: normal  Heel to shin coordination: normal    Tremor   Resting tremor: absent  Intention tremor: absent  Action tremor: absent    Reflexes   Right brachioradialis: 2+  Left brachioradialis: 2+  Right biceps: 2+  Left biceps: 2+  Right triceps: 2+  Left triceps: 2+  Right patellar: 1+  Left patellar: 1+  Right achilles: 0  Left achilles: 0  Right plantar: normal  Left plantar: normal  Right Ochoa: absent  Left Ochoa: absent  Right ankle clonus: absent  Left ankle clonus: absent  Right pendular knee jerk: absent  Left pendular knee jerk: absent      Lab Results   Component Value Date    WBC 9.39 06/25/2018    HGB 13.1 06/25/2018    HCT 41.5 06/25/2018    MCV 96 06/25/2018     06/25/2018     Sodium   Date Value Ref Range Status   06/25/2018 142 136 - 145 mmol/L Final     Potassium   Date Value Ref Range Status   06/25/2018 3.8 3.5 - 5.1 mmol/L Final     Chloride   Date Value Ref Range Status   06/25/2018 106 95 - 110 mmol/L Final     CO2   Date Value Ref Range Status   06/25/2018 27 23 - 29 mmol/L Final     Glucose   Date Value Ref Range Status   06/25/2018 59 (L) 70 - 110 mg/dL Final     BUN, Bld   Date Value Ref Range Status    06/25/2018 22 8 - 23 mg/dL Final     Creatinine   Date Value Ref Range Status   06/25/2018 1.0 0.5 - 1.4 mg/dL Final     Calcium   Date Value Ref Range Status   06/25/2018 9.3 8.7 - 10.5 mg/dL Final     Total Protein   Date Value Ref Range Status   06/25/2018 7.1 6.0 - 8.4 g/dL Final     Albumin   Date Value Ref Range Status   06/25/2018 3.8 3.5 - 5.2 g/dL Final     Total Bilirubin   Date Value Ref Range Status   06/25/2018 0.5 0.1 - 1.0 mg/dL Final     Comment:     For infants and newborns, interpretation of results should be based  on gestational age, weight and in agreement with clinical  observations.  Premature Infant recommended reference ranges:  Up to 24 hours.............<8.0 mg/dL  Up to 48 hours............<12.0 mg/dL  3-5 days..................<15.0 mg/dL  6-29 days.................<15.0 mg/dL       Alkaline Phosphatase   Date Value Ref Range Status   06/25/2018 96 55 - 135 U/L Final     AST   Date Value Ref Range Status   06/25/2018 22 10 - 40 U/L Final     ALT   Date Value Ref Range Status   06/25/2018 21 10 - 44 U/L Final     Anion Gap   Date Value Ref Range Status   06/25/2018 9 8 - 16 mmol/L Final     eGFR if    Date Value Ref Range Status   06/25/2018 >60.0 >60 mL/min/1.73 m^2 Final     eGFR if non    Date Value Ref Range Status   06/25/2018 53.3 (A) >60 mL/min/1.73 m^2 Final     Comment:     Calculation used to obtain the estimated glomerular filtration  rate (eGFR) is the CKD-EPI equation.        Lab Results   Component Value Date    [id number] 381 06/25/2018     Lab Results   Component Value Date    TSH 2.062 06/25/2018     2016    CTH Unremarkable     NCS/EMG PN     10-     Labs NL SPEP/IPEP.       Assessment:       1. Mild neurocognitive disorder    2. Dyslipidemia    3. Hypertension, unspecified type    4. History of stroke 1/16/13 Rt basal ganglia    5. Venous insufficiency    6. CKD (chronic kidney disease) stage 3, GFR 30-59 ml/min    7.  Pre-diabetes    8. Bilateral carotid artery disease, unspecified type    9. Keratosis, seborrheic    10. Acromioclavicular joint arthritis, unspecified laterality    11. Bilateral shoulder pain, unspecified chronicity    12. Left arm weakness    13. Neuropathy of both feet    14. CSPN (cryptogenic sensory polyneuropathy)    15. Memory difficulties    16. Insomnia, unspecified type        Plan:       CSPN      Taper GBP off. It is meant for PAIN and not for numbness.    Avoid driving     Test water with an unaffected hand or foot.    Use caution when trimming nails. File sharp areas    Wear shoes that fit well to avoid pressure points, blisters, and ulcers.    Inspect your hands and feet carefully (including the soles of your feet and between your toes) at least once a week.    Monitor clinically       POSSIBLE MILD NCD        Brain MRI WO     Comprehensive Neuropsychological testing.    Will start memantine/Namenda and titrate slowly to 10 mg BID.  The side effects include dizziness, seizures and nightmares and discussed with patient and family.    Trazodone 50 mg QHS to help with insomnia. Instructed the family to watch for excessive sedation or paradoxical agitation.     Falling Down Precautions    Avoid driving and access to firearms     Incremental 24/Care     Help with finances and decision making.    Join support group.    Proofing the house and use labeling.    Avoid antihistamines and anticholinergics.    Avoid changing routine.    The family was made aware  and given several resources.    Use written reminders.    Avoid multitasking.    Healthy diet, exercise (physical and cognitive).    Good sleep hygiene.      PREVENTION OF DELIRIUM       1. Good hydration and avoid electrolyte imbalance  2. Recognize andtreat infections immediately especially UTI.  3. Bladder emptying and prevent constipation.   4. Provide stimulating activities and familiar objects  5. Use eyeglasses and hearing aids if needed.   6. Use  simple and regular communication about people, current place and time  7. Mobility and range-of-motion exercises  8. Reduce noise, lighting and avoid sleep interruptions  9. Non-narcotic pain management.  10.Nondrug treatment for sleep problems or anxiety  11. Avoid antihistamines.  12. Avoid narcotics.  13. Avoid benzodiazepines.           RTC in 4 weeks

## 2019-08-28 NOTE — PATIENT INSTRUCTIONS
Trazodone tablets  What is this medicine?  TRAZODONE (TRAZ oh done) is used to treat depression.  How should I use this medicine?  Take this medicine by mouth with a glass of water. Follow the directions on the prescription label. Take this medicine shortly after a meal or a light snack. Take your medicine at regular intervals. Do not take your medicine more often than directed. Do not stop taking this medicine suddenly except upon the advice of your doctor. Stopping this medicine too quickly may cause serious side effects or your condition may worsen.  A special MedGuide will be given to you by the pharmacist with each prescription and refill. Be sure to read this information carefully each time.  Talk to your pediatrician regarding the use of this medicine in children. Special care may be needed.  What side effects may I notice from receiving this medicine?  Side effects that you should report to your doctor or health care professional as soon as possible:  · allergic reactions like skin rash, itching or hives, swelling of the face, lips, or tongue  · fast, irregular heartbeat  · feeling faint or lightheaded, falls  · painful erections or other sexual dysfunction  · suicidal thoughts or other mood changes  · trembling  Side effects that usually do not require medical attention (report to your doctor or health care professional if they continue or are bothersome):  · constipation  · headache  · muscle aches or pains  · nausea, vomiting  · unusually weak or tired  What may interact with this medicine?  Do not take this medicine with any of the following medications:  · certain medicines for fungal infections like fluconazole, itraconazole, ketoconazole, posaconazole, voriconazole  · cisapride  · dofetilide  · dronedarone  · linezolid  · MAOIs like Carbex, Eldepryl, Marplan, Nardil, and Parnate  · mesoridazine  · methylene blue (injected into a vein)  · pimozide  · saquinavir  · thioridazine  · ziprasidone  This  medicine may also interact with the following medications:  · alcohol  · antiviral medicines for HIV or AIDS  · aspirin and aspirin-like medicines  · barbiturates like phenobarbital  · certain medicines for blood pressure, heart disease, irregular heart beat  · certain medicines for depression, anxiety, or psychotic disturbances  · certain medicines for migraine headache like almotriptan, eletriptan, frovatriptan, naratriptan, rizatriptan, sumatriptan, zolmitriptan  · certain medicines for seizures like carbamazepine and phenytoin  · certain medicines for sleep  · certain medicines that treat or prevent blood clots like dalteparin, enoxaparin, warfarin  · digoxin  · fentanyl  · lithium  · NSAIDS, medicines for pain and inflammation, like ibuprofen or naproxen  · other medicines that prolong the QT interval (cause an abnormal heart rhythm)  · rasagiline  · supplements like Carlos's wort, kava kava, valerian  · tramadol  · tryptophan  What if I miss a dose?  If you miss a dose, take it as soon as you can. If it is almost time for your next dose, take only that dose. Do not take double or extra doses.  Where should I keep my medicine?  Keep out of the reach of children.  Store at room temperature between 15 and 30 degrees C (59 to 86 degrees F). Protect from light. Keep container tightly closed. Throw away any unused medicine after the expiration date.  What should I tell my health care provider before I take this medicine?  They need to know if you have any of these conditions:  · attempted suicide or thinking about it  · bipolar disorder  · bleeding problems  · glaucoma  · heart disease, or previous heart attack  · irregular heart beat  · kidney or liver disease  · low levels of sodium in the blood  · an unusual or allergic reaction to trazodone, other medicines, foods, dyes or preservatives  · pregnant or trying to get pregnant  · breast-feeding  What should I watch for while using this medicine?  Tell your doctor  if your symptoms do not get better or if they get worse. Visit your doctor or health care professional for regular checks on your progress. Because it may take several weeks to see the full effects of this medicine, it is important to continue your treatment as prescribed by your doctor.  Patients and their families should watch out for new or worsening thoughts of suicide or depression. Also watch out for sudden changes in feelings such as feeling anxious, agitated, panicky, irritable, hostile, aggressive, impulsive, severely restless, overly excited and hyperactive, or not being able to sleep. If this happens, especially at the beginning of treatment or after a change in dose, call your health care professional.  You may get drowsy or dizzy. Do not drive, use machinery, or do anything that needs mental alertness until you know how this medicine affects you. Do not stand or sit up quickly, especially if you are an older patient. This reduces the risk of dizzy or fainting spells. Alcohol may interfere with the effect of this medicine. Avoid alcoholic drinks.  This medicine may cause dry eyes and blurred vision. If you wear contact lenses you may feel some discomfort. Lubricating drops may help. See your eye doctor if the problem does not go away or is severe.  Your mouth may get dry. Chewing sugarless gum, sucking hard candy and drinking plenty of water may help. Contact your doctor if the problem does not go away or is severe.  NOTE:This sheet is a summary. It may not cover all possible information. If you have questions about this medicine, talk to your doctor, pharmacist, or health care provider. Copyright© 2017 [employer]        Memantine Tablets  What is this medicine?  MEMANTINE (MEM an teen) is used to treat dementia caused by Alzheimer's disease.  How should I use this medicine?  Take this medicine by mouth with a glass of water. Follow the directions on the prescription label. You may take this medicine  with or without food. Take your doses at regular intervals. Do not take your medicine more often than directed. Continue to take your medicine even if you feel better. Do not stop taking except on the advice of your doctor or health care professional.  Talk to your pediatrician regarding the use of this medicine in children. Special care may be needed.  What side effects may I notice from receiving this medicine?  Side effects that you should report to your doctor or health care professional as soon as possible:  · allergic reactions like skin rash, itching or hives, swelling of the face, lips, or tongue  · agitation or a feeling of restlessness  · depressed mood  · dizziness  · hallucinations  · redness, blistering, peeling or loosening of the skin, including inside the mouth  · seizures  · vomiting  Side effects that usually do not require medical attention (report to your doctor or health care professional if they continue or are bothersome):  · constipation  · diarrhea  · headache  · nausea  · trouble sleeping  What may interact with this medicine?  · acetazolamide  · amantadine  · cimetidine  · dextromethorphan  · dofetilide  · hydrochlorothiazide  · ketamine  · metformin  · methazolamide  · quinidine  · ranitidine  · sodium bicarbonate  · triamterene  What if I miss a dose?  If you miss a dose, take it as soon as you can. If it is almost time for your next dose, take only that dose. Do not take double or extra doses. If you do not take your medicine for several days, contact your health care provider. Your dose may need to be changed.  Where should I keep my medicine?  Keep out of the reach of children.  Store at room temperature between 15 degrees and 30 degrees C (59 degrees and 86 degrees F). Throw away any unused medicine after the expiration date.  What should I tell my health care provider before I take this medicine?  They need to know if you have any of these conditions:  · difficulty passing  urine  · kidney disease  · liver disease  · seizures  · an unusual or allergic reaction to memantine, other medicines, foods, dyes, or preservatives  · pregnant or trying to get pregnant  · breast-feeding  What should I watch for while using this medicine?  Visit your doctor or health care professional for regular checks on your progress. Check with your doctor or health care professional if there is no improvement in your symptoms or if they get worse.  You may get drowsy or dizzy. Do not drive, use machinery, or do anything that needs mental alertness until you know how this drug affects you. Do not stand or sit up quickly, especially if you are an older patient. This reduces the risk of dizzy or fainting spells. Alcohol can make you more drowsy and dizzy. Avoid alcoholic drinks.  NOTE:This sheet is a summary. It may not cover all possible information. If you have questions about this medicine, talk to your doctor, pharmacist, or health care provider. Copyright© 2017 [employer]

## 2019-09-03 ENCOUNTER — TELEPHONE (OUTPATIENT)
Dept: NEUROLOGY | Facility: CLINIC | Age: 79
End: 2019-09-03

## 2019-09-12 ENCOUNTER — TELEPHONE (OUTPATIENT)
Dept: NEUROLOGY | Facility: CLINIC | Age: 79
End: 2019-09-12

## 2019-09-12 ENCOUNTER — HOSPITAL ENCOUNTER (OUTPATIENT)
Dept: RADIOLOGY | Facility: HOSPITAL | Age: 79
Discharge: HOME | DRG: 948 | End: 2019-09-12
Attending: PSYCHIATRY & NEUROLOGY
Payer: MEDICARE

## 2019-09-12 ENCOUNTER — HOSPITAL ENCOUNTER (INPATIENT)
Facility: HOSPITAL | Age: 79
LOS: 1 days | Discharge: HOME | DRG: 948 | End: 2019-09-13
Attending: EMERGENCY MEDICINE | Admitting: EMERGENCY MEDICINE
Payer: MEDICARE

## 2019-09-12 DIAGNOSIS — R93.89 ABNORMAL MRI: Primary | ICD-10-CM

## 2019-09-12 DIAGNOSIS — G47.00 INSOMNIA: ICD-10-CM

## 2019-09-12 DIAGNOSIS — M19.019 ACROMIOCLAVICULAR JOINT ARTHRITIS, UNSPECIFIED LATERALITY: ICD-10-CM

## 2019-09-12 DIAGNOSIS — Z86.73 HISTORY OF STROKE: Chronic | ICD-10-CM

## 2019-09-12 DIAGNOSIS — M25.511 BILATERAL SHOULDER PAIN, UNSPECIFIED CHRONICITY: ICD-10-CM

## 2019-09-12 DIAGNOSIS — R73.03 PRE-DIABETES: ICD-10-CM

## 2019-09-12 DIAGNOSIS — G60.8 CSPN (CRYPTOGENIC SENSORY POLYNEUROPATHY): ICD-10-CM

## 2019-09-12 DIAGNOSIS — M19.019 AC (ACROMIOCLAVICULAR) ARTHRITIS: ICD-10-CM

## 2019-09-12 DIAGNOSIS — M25.512 BILATERAL SHOULDER PAIN, UNSPECIFIED CHRONICITY: ICD-10-CM

## 2019-09-12 DIAGNOSIS — E78.5 DYSLIPIDEMIA: ICD-10-CM

## 2019-09-12 DIAGNOSIS — N18.30 CKD (CHRONIC KIDNEY DISEASE) STAGE 3, GFR 30-59 ML/MIN: ICD-10-CM

## 2019-09-12 DIAGNOSIS — L82.1 KERATOSIS, SEBORRHEIC: ICD-10-CM

## 2019-09-12 DIAGNOSIS — I87.2 VENOUS INSUFFICIENCY: ICD-10-CM

## 2019-09-12 DIAGNOSIS — Z86.73 HISTORY OF STROKE: ICD-10-CM

## 2019-09-12 DIAGNOSIS — I10 HYPERTENSION, UNSPECIFIED TYPE: ICD-10-CM

## 2019-09-12 DIAGNOSIS — M25.512 BILATERAL SHOULDER PAIN: ICD-10-CM

## 2019-09-12 DIAGNOSIS — I77.9 BILATERAL CAROTID ARTERY DISEASE, UNSPECIFIED TYPE: ICD-10-CM

## 2019-09-12 DIAGNOSIS — M25.511 BILATERAL SHOULDER PAIN: ICD-10-CM

## 2019-09-12 DIAGNOSIS — G57.93 NEUROPATHY OF BOTH FEET: ICD-10-CM

## 2019-09-12 DIAGNOSIS — G47.00 INSOMNIA, UNSPECIFIED TYPE: ICD-10-CM

## 2019-09-12 DIAGNOSIS — G31.84 MILD NEUROCOGNITIVE DISORDER: ICD-10-CM

## 2019-09-12 DIAGNOSIS — R29.898 LEFT ARM WEAKNESS: ICD-10-CM

## 2019-09-12 DIAGNOSIS — I10 HYPERTENSION: ICD-10-CM

## 2019-09-12 DIAGNOSIS — R41.3 MEMORY DIFFICULTIES: ICD-10-CM

## 2019-09-12 DIAGNOSIS — I77.9 BILATERAL CAROTID ARTERY DISEASE: ICD-10-CM

## 2019-09-12 DIAGNOSIS — R41.89 COGNITIVE DECLINE: ICD-10-CM

## 2019-09-12 LAB
ALBUMIN SERPL BCP-MCNC: 3.4 G/DL (ref 3.5–5.2)
ALP SERPL-CCNC: 88 U/L (ref 55–135)
ALT SERPL W/O P-5'-P-CCNC: 13 U/L (ref 10–44)
ANION GAP SERPL CALC-SCNC: 8 MMOL/L (ref 8–16)
AST SERPL-CCNC: 15 U/L (ref 10–40)
BASOPHILS # BLD AUTO: 0.04 K/UL (ref 0–0.2)
BASOPHILS NFR BLD: 0.5 % (ref 0–1.9)
BILIRUB SERPL-MCNC: 0.3 MG/DL (ref 0.1–1)
BILIRUB UR QL STRIP: NEGATIVE
BILIRUB UR QL STRIP: NEGATIVE
BUN SERPL-MCNC: 26 MG/DL (ref 8–23)
CALCIUM SERPL-MCNC: 8.7 MG/DL (ref 8.7–10.5)
CHLORIDE SERPL-SCNC: 110 MMOL/L (ref 95–110)
CLARITY UR: CLEAR
CLARITY UR: CLEAR
CO2 SERPL-SCNC: 26 MMOL/L (ref 23–29)
COLOR UR: YELLOW
COLOR UR: YELLOW
CREAT SERPL-MCNC: 1.1 MG/DL (ref 0.5–1.4)
DIFFERENTIAL METHOD: ABNORMAL
EOSINOPHIL # BLD AUTO: 0.2 K/UL (ref 0–0.5)
EOSINOPHIL NFR BLD: 2.1 % (ref 0–8)
ERYTHROCYTE [DISTWIDTH] IN BLOOD BY AUTOMATED COUNT: 14 % (ref 11.5–14.5)
EST. GFR  (AFRICAN AMERICAN): 54 ML/MIN/1.73 M^2
EST. GFR  (NON AFRICAN AMERICAN): 47 ML/MIN/1.73 M^2
GLUCOSE SERPL-MCNC: 103 MG/DL (ref 70–110)
GLUCOSE UR QL STRIP: NEGATIVE
GLUCOSE UR QL STRIP: NEGATIVE
HCT VFR BLD AUTO: 38.7 % (ref 37–48.5)
HGB BLD-MCNC: 12.6 G/DL (ref 12–16)
HGB UR QL STRIP: NEGATIVE
HGB UR QL STRIP: NEGATIVE
INR PPP: 1 (ref 0.8–1.2)
KETONES UR QL STRIP: NEGATIVE
KETONES UR QL STRIP: NEGATIVE
LEUKOCYTE ESTERASE UR QL STRIP: ABNORMAL
LEUKOCYTE ESTERASE UR QL STRIP: NEGATIVE
LYMPHOCYTES # BLD AUTO: 2.3 K/UL (ref 1–4.8)
LYMPHOCYTES NFR BLD: 30.1 % (ref 18–48)
MCH RBC QN AUTO: 30.3 PG (ref 27–31)
MCHC RBC AUTO-ENTMCNC: 32.6 G/DL (ref 32–36)
MCV RBC AUTO: 93 FL (ref 82–98)
MICROSCOPIC COMMENT: NORMAL
MONOCYTES # BLD AUTO: 0.8 K/UL (ref 0.3–1)
MONOCYTES NFR BLD: 10.4 % (ref 4–15)
NEUTROPHILS # BLD AUTO: 4.4 K/UL (ref 1.8–7.7)
NEUTROPHILS NFR BLD: 57.3 % (ref 38–73)
NITRITE UR QL STRIP: NEGATIVE
NITRITE UR QL STRIP: NEGATIVE
PH UR STRIP: 6 [PH] (ref 5–8)
PH UR STRIP: 7 [PH] (ref 5–8)
PLATELET # BLD AUTO: 139 K/UL (ref 150–350)
PMV BLD AUTO: 9.8 FL (ref 9.2–12.9)
POTASSIUM SERPL-SCNC: 3.9 MMOL/L (ref 3.5–5.1)
PROT SERPL-MCNC: 6.3 G/DL (ref 6–8.4)
PROT UR QL STRIP: NEGATIVE
PROT UR QL STRIP: NEGATIVE
PROTHROMBIN TIME: 10.9 SEC (ref 9–12.5)
RBC # BLD AUTO: 4.16 M/UL (ref 4–5.4)
RBC #/AREA URNS HPF: 2 /HPF (ref 0–4)
SODIUM SERPL-SCNC: 144 MMOL/L (ref 136–145)
SP GR UR STRIP: 1.01 (ref 1–1.03)
SP GR UR STRIP: 1.01 (ref 1–1.03)
TSH SERPL DL<=0.005 MIU/L-ACNC: 1.37 UIU/ML (ref 0.4–4)
URN SPEC COLLECT METH UR: ABNORMAL
URN SPEC COLLECT METH UR: NORMAL
UROBILINOGEN UR STRIP-ACNC: NEGATIVE EU/DL
UROBILINOGEN UR STRIP-ACNC: NEGATIVE EU/DL
WBC # BLD AUTO: 7.7 K/UL (ref 3.9–12.7)
WBC #/AREA URNS HPF: 3 /HPF (ref 0–5)

## 2019-09-12 PROCEDURE — 83036 HEMOGLOBIN GLYCOSYLATED A1C: CPT | Mod: HCNC

## 2019-09-12 PROCEDURE — 96374 THER/PROPH/DIAG INJ IV PUSH: CPT

## 2019-09-12 PROCEDURE — 36415 COLL VENOUS BLD VENIPUNCTURE: CPT | Mod: HCNC

## 2019-09-12 PROCEDURE — 70551 MRI BRAIN STEM W/O DYE: CPT | Mod: TC,HCNC

## 2019-09-12 PROCEDURE — 93010 ELECTROCARDIOGRAM REPORT: CPT | Mod: HCNC,,, | Performed by: INTERNAL MEDICINE

## 2019-09-12 PROCEDURE — 80061 LIPID PANEL: CPT | Mod: HCNC

## 2019-09-12 PROCEDURE — 85025 COMPLETE CBC W/AUTO DIFF WBC: CPT | Mod: HCNC

## 2019-09-12 PROCEDURE — 81000 URINALYSIS NONAUTO W/SCOPE: CPT | Mod: HCNC

## 2019-09-12 PROCEDURE — 21400001 HC TELEMETRY ROOM: Mod: HCNC

## 2019-09-12 PROCEDURE — 93010 EKG 12-LEAD: ICD-10-PCS | Mod: HCNC,,, | Performed by: INTERNAL MEDICINE

## 2019-09-12 PROCEDURE — 85610 PROTHROMBIN TIME: CPT | Mod: HCNC

## 2019-09-12 PROCEDURE — 63600175 PHARM REV CODE 636 W HCPCS: Mod: HCNC | Performed by: EMERGENCY MEDICINE

## 2019-09-12 PROCEDURE — 81003 URINALYSIS AUTO W/O SCOPE: CPT | Mod: HCNC

## 2019-09-12 PROCEDURE — 99223 PR INITIAL HOSPITAL CARE,LEVL III: ICD-10-PCS | Mod: HCNC,,, | Performed by: PSYCHIATRY & NEUROLOGY

## 2019-09-12 PROCEDURE — 84443 ASSAY THYROID STIM HORMONE: CPT | Mod: HCNC

## 2019-09-12 PROCEDURE — 99223 1ST HOSP IP/OBS HIGH 75: CPT | Mod: HCNC,,, | Performed by: PSYCHIATRY & NEUROLOGY

## 2019-09-12 PROCEDURE — 99285 EMERGENCY DEPT VISIT HI MDM: CPT | Mod: 25,HCNC

## 2019-09-12 PROCEDURE — 93005 ELECTROCARDIOGRAM TRACING: CPT | Mod: HCNC

## 2019-09-12 PROCEDURE — 80053 COMPREHEN METABOLIC PANEL: CPT | Mod: HCNC

## 2019-09-12 PROCEDURE — 63600175 PHARM REV CODE 636 W HCPCS: Mod: HCNC | Performed by: PSYCHIATRY & NEUROLOGY

## 2019-09-12 RX ORDER — ATORVASTATIN CALCIUM 40 MG/1
40 TABLET, FILM COATED ORAL DAILY
Status: DISCONTINUED | OUTPATIENT
Start: 2019-09-13 | End: 2019-09-13 | Stop reason: HOSPADM

## 2019-09-12 RX ORDER — ENOXAPARIN SODIUM 100 MG/ML
40 INJECTION SUBCUTANEOUS EVERY 24 HOURS
Status: DISCONTINUED | OUTPATIENT
Start: 2019-09-12 | End: 2019-09-13 | Stop reason: HOSPADM

## 2019-09-12 RX ORDER — SODIUM CHLORIDE 0.9 % (FLUSH) 0.9 %
10 SYRINGE (ML) INJECTION
Status: DISCONTINUED | OUTPATIENT
Start: 2019-09-12 | End: 2019-09-13 | Stop reason: HOSPADM

## 2019-09-12 RX ORDER — LABETALOL HYDROCHLORIDE 5 MG/ML
10 INJECTION, SOLUTION INTRAVENOUS EVERY 6 HOURS PRN
Status: DISCONTINUED | OUTPATIENT
Start: 2019-09-12 | End: 2019-09-13 | Stop reason: HOSPADM

## 2019-09-12 RX ORDER — ASPIRIN 81 MG/1
81 TABLET ORAL DAILY
Status: DISCONTINUED | OUTPATIENT
Start: 2019-09-13 | End: 2019-09-13 | Stop reason: HOSPADM

## 2019-09-12 RX ADMIN — THIAMINE HYDROCHLORIDE 100 MG: 100 INJECTION, SOLUTION INTRAMUSCULAR; INTRAVENOUS at 04:09

## 2019-09-12 RX ADMIN — ENOXAPARIN SODIUM 40 MG: 100 INJECTION SUBCUTANEOUS at 09:09

## 2019-09-12 NOTE — TELEPHONE ENCOUNTER
Daughter called in wanting to know why the have to go to the ER to be evaluated . Advised pt because the Hospital has equipment  To evaluate .

## 2019-09-12 NOTE — PROGRESS NOTES
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    The patient needs to come to the ER and get admitted for evaluation of stroke, seizures and CNS infections as well.

## 2019-09-12 NOTE — CONSULTS
"Subjective:       Patient ID: Shannon Solano is a 79 y.o. female.    Chief Complaint: Abnormal MRI (daughter states, "The doctor told us to bring her here for evaluation of her brain MRI.")    HPI     BACKGROUND HISTORY     The patient was accompanied by her daughter when I firs saw her. The daughter said that she is here for numbness in her feet. The patient started complaining of bilateral symmetric feet paroxysmal tingling and burning pain about 2 years ago.  The patient usually notices the pain after significant physical activity or prolonged sitting or standing.  The pain is paroxysmal and not persistent  The pain and tingling involves the whole feet, mainly the sole area and progresses to the very distal part of the legs.  The patient denies any hand symptoms. No ankle or foot weakness. The patient denies any low back with radiation to the legs.  The patient has good control on her bowel and bladder. No history of DM. No known history of thyroid disease. No history of malignancies.  No history of toxic environmental exposure.  No history of autoimmune disease. No history of excessive alcohol or recreational drug abuse. No history of chronic hepatitis or HIV. No history of malabsorption or nutritional deficiencies. In  B12, FA, TFT are NL. NCS/EMG in 2016 showed PN. She is on  mg TID which seems to be helpful. I ordered SPEP/IPEP, Compound cream. 10- Labs NL SPEP/IPEP. Now she saying that she never had pain! Takes  mg QHS for numbness!! I stopped GBP because it was meant for pain.     Reviewed the records and there was some concern about her memory. The patient describes some memory lapses especially with "to-do-list" but otherwise fully independent and functions at high level. In  B12, FA, TFT are NL. RPR is -ve. CTH in 2016 is unremarkable and CTH was ordered and pending. No behavioral problems. Recommended monitoring and consider CNP Testing.The daughter remained worried " about the patient's memory. The patient remains independent and highly functioning but has episodes of memory lapses and confusion that concerned the daughter like going to the mailbox and not knowing why she was there, forgetting that she went shopping.The patient was also having severe insomnia. I ordered Brain MRI and started her on Namenda and Trazodone. I reviewed the Brain MRI which showed B/L frontal cortical DWI+ve signal which can be see postictally but could be ischemic or infectious in nature. The patient and daughter deny seizure activity. I called the patient's daughter to bring her to the hospital for further evaluation and management.       Review of Systems   Constitutional: Negative for appetite change and fatigue.   HENT: Negative for hearing loss and tinnitus.    Eyes: Negative for photophobia and visual disturbance.   Respiratory: Negative for apnea and shortness of breath.    Cardiovascular: Negative for chest pain and palpitations.   Gastrointestinal: Negative for nausea and vomiting.   Endocrine: Negative for cold intolerance and heat intolerance.   Genitourinary: Negative for difficulty urinating and urgency.   Musculoskeletal: Positive for joint swelling. Negative for arthralgias, back pain, gait problem, myalgias, neck pain and neck stiffness.   Skin: Negative for color change and rash.   Allergic/Immunologic: Negative for environmental allergies and immunocompromised state.   Neurological: Positive for numbness. Negative for dizziness, tremors, seizures, syncope, facial asymmetry, speech difficulty, weakness, light-headedness and headaches.   Hematological: Negative for adenopathy. Does not bruise/bleed easily.   Psychiatric/Behavioral: Negative for agitation, behavioral problems, confusion, decreased concentration, dysphoric mood, hallucinations, self-injury, sleep disturbance and suicidal ideas. The patient is not hyperactive.        No current facility-administered medications for this  encounter.     Current Outpatient Medications:     aspirin (ECOTRIN) 81 MG EC tablet, Take 81 mg by mouth once daily., Disp: , Rfl:     irbesartan (AVAPRO) 150 MG tablet, Take 2 tablets (300 mg total) by mouth every evening., Disp: 180 tablet, Rfl: 0    memantine (NAMENDA) 10 MG Tab, Take 1 tablet (10 mg total) by mouth 2 (two) times daily., Disp: 60 tablet, Rfl: 5    pravastatin (PRAVACHOL) 40 MG tablet, TAKE 1 TABLET BY MOUTH EVERY DAY, Disp: 90 tablet, Rfl: 0    traZODone (DESYREL) 50 MG tablet, Take 1 tablet (50 mg total) by mouth every evening., Disp: 30 tablet, Rfl: 5    multivitamin (ONE DAILY MULTIVITAMIN) per tablet, Take 1 tablet by mouth once daily., Disp: , Rfl:     tretinoin (RETIN-A) 0.025 % cream, Apply topically every evening., Disp: 45 g, Rfl: 5  Past Medical History:   Diagnosis Date    Anemia     Arthritis     Cataract     Disorder of kidney and ureter     Hyperlipidemia     Hypertension     Insomnia 8/28/2019    Osteoarthritis     Polyneuropathy     Stroke      Past Surgical History:   Procedure Laterality Date    APPENDECTOMY      CATARACT EXTRACTION Bilateral 2014    EYE SURGERY Bilateral     HYSTERECTOMY      VARICOSE VEIN SURGERY       Social History     Socioeconomic History    Marital status:      Spouse name: Not on file    Number of children: 2    Years of education: Not on file    Highest education level: Not on file   Occupational History    Not on file   Social Needs    Financial resource strain: Not on file    Food insecurity:     Worry: Not on file     Inability: Not on file    Transportation needs:     Medical: Not on file     Non-medical: Not on file   Tobacco Use    Smoking status: Never Smoker    Smokeless tobacco: Never Used   Substance and Sexual Activity    Alcohol use: No    Drug use: No    Sexual activity: Not Currently   Lifestyle    Physical activity:     Days per week: Not on file     Minutes per session: Not on file    Stress:  Not on file   Relationships    Social connections:     Talks on phone: Not on file     Gets together: Not on file     Attends Mandaen service: Not on file     Active member of club or organization: Not on file     Attends meetings of clubs or organizations: Not on file     Relationship status: Not on file   Other Topics Concern    Are you pregnant or think you may be? No    Breast-feeding No   Social History Narrative    Not on file       Objective:     Vital signs reviewed     GENERAL APPEARANCE:     The patient looks comfortable with evidence of insomnia.    No signs of medical or psychiatric distress.    Normal breathing pattern.    No dysmorphic features    Normal eye contact.     GENERAL MEDICAL EXAM:    HEENT:  Head is atraumatic normocephalic. No tender temporal arteries.     Neck and Axillae: No JVD. No carotid bruits. No thyromegaly. No lymphadenopathy.    Cardiopulmonary: No cyanosis. No tachypnea. Normal respiratory effort.  Clear breath sounds. Normal heart sounds with regular rhythm and no murmurs.    Gastrointestinal:  No stomas or lesions. No hernias.  Abdomen is soft non-tender. No masses or organomegaly.    Skin, Hair and Nails: No pathognonomic skin rash. No neurofibromatosis.   No stigmata of autoimmune disease.     Limbs: No varicose veins. B/L ankle edema. Symmetric pulses.     Muskoskeletal: No deformities.No spine tenderness.   No signs of longstanding neuropathy. No dislocations or fractures.        Neurologic Exam     Mental Status   Oriented to person, place, and time.   Registration: recalls 3 of 3 objects. Recall at 5 minutes: recalls 3 of 3 objects. Follows 3 step commands.   Attention: normal. Concentration: normal.   Speech: speech is normal   Level of consciousness: alert  Knowledge: good and consistent with education. Able to perform simple calculations.   Able to name object. Able to read. Able to repeat. Able to write. Normal comprehension.     MOCA  30    Visuospatial/Executive  5   Naming                          3  Attention                        6  Language                      3  Abstraction                    2  Recall                            5  Orientation                     6       Cranial Nerves     CN II   Visual fields full to confrontation.   Visual acuity: normal  Right visual field deficit: none  Left visual field deficit: none     CN III, IV, VI   Pupils are equal, round, and reactive to light.  Extraocular motions are normal.   Right pupil: Size: 2 mm. Shape: regular. Reactivity: brisk. Consensual response: intact. Accommodation: intact.   Left pupil: Size: 2 mm. Shape: regular. Reactivity: brisk. Consensual response: intact. Accommodation: intact.   CN III: no CN III palsy  CN VI: no CN VI palsy  Nystagmus: none   Diplopia: none  Ophthalmoparesis: none  Upgaze: normal  Downgaze: normal  Conjugate gaze: present  Vestibulo-ocular reflex: present    CN V   Facial sensation intact.   Right facial sensation deficit: none  Left facial sensation deficit: none  Right corneal reflex: normal  Left corneal reflex: normal    CN VII   Right facial weakness: none  Left facial weakness: none  Right taste: normal  Left taste: normal    CN VIII   CN VIII normal.   Hearing: intact  Right Rinne: AC > BC  Left Rinne: AC > BC  Guzmán: does not lateralize     CN IX, X   CN IX normal.   CN X normal.   Palate: symmetric  Right gag reflex: normal  Left gag reflex: normal    CN XI   CN XI normal.   Right sternocleidomastoid strength: normal  Left sternocleidomastoid strength: normal  Right trapezius strength: normal  Left trapezius strength: normal    CN XII   CN XII normal.   Tongue: not atrophic  Fasciculations: absent  Tongue deviation: none    Motor Exam   Muscle bulk: normal  Overall muscle tone: normal  Right arm tone: normal  Left arm tone: normal  Right arm pronator drift: absent  Left arm pronator drift: absent  Right leg tone: normal  Left leg tone:  normal    Strength   Strength 5/5 throughout.   Right neck flexion:   Left neck flexion: 5  Right neck extension: /  Left neck extension:   Right deltoid: 5/5  Left deltoid: /5  Right biceps: 5  Left biceps:   Right triceps:   Left triceps:   Right wrist flexion:   Left wrist flexion:   Right wrist extension:   Left wrist extension:   Right interossei:   Left interossei:   Right abdominals:   Left abdominals:   Right iliopsoas:   Left iliopsoas:   Right quadriceps:   Left quadriceps:   Right hamstrin/5  Left hamstrin/5  Right glutei:   Left glutei:   Right anterior tibial:   Left anterior tibial:   Right posterior tibial:   Left posterior tibial:   Right peroneal:   Left peroneal:   Right gastroc:   Left gastroc:     Sensory Exam   Right arm light touch: normal  Left arm light touch: normal  Right leg light touch: decreased from ankle  Left leg light touch: decreased from ankle  Right arm vibration: normal  Left arm vibration: normal  Right leg vibration: decreased from toes  Left leg vibration: decreased from toes  Right arm proprioception: normal  Left arm proprioception: normal  Right leg proprioception: decreased from toes  Left leg proprioception: decreased from toes  Right arm pinprick: normal  Left arm pinprick: normal  Right leg pinprick: decreased from ankle  Left leg pinprick: decreased from ankle  Graphesthesia: normal  Stereognosis: normal    Gait, Coordination, and Reflexes     Gait  Gait: (Slow)    Coordination   Finger to nose coordination: normal  Heel to shin coordination: normal    Tremor   Resting tremor: absent  Intention tremor: absent  Action tremor: absent    Reflexes   Right brachioradialis: 2+  Left brachioradialis: 2+  Right biceps: 2+  Left biceps: 2+  Right triceps: 2+  Left triceps: 2+  Right patellar: 1+  Left patellar: 1+  Right achilles: 0  Left achilles: 0  Right plantar: normal  Left  plantar: normal  Right Ochoa: absent  Left Ochoa: absent  Right ankle clonus: absent  Left ankle clonus: absent  Right pendular knee jerk: absent  Left pendular knee jerk: absent      Lab Results   Component Value Date    WBC 7.70 09/12/2019    HGB 12.6 09/12/2019    HCT 38.7 09/12/2019    MCV 93 09/12/2019     (L) 09/12/2019     Sodium   Date Value Ref Range Status   09/12/2019 144 136 - 145 mmol/L Final     Potassium   Date Value Ref Range Status   09/12/2019 3.9 3.5 - 5.1 mmol/L Final     Chloride   Date Value Ref Range Status   09/12/2019 110 95 - 110 mmol/L Final     CO2   Date Value Ref Range Status   09/12/2019 26 23 - 29 mmol/L Final     Glucose   Date Value Ref Range Status   09/12/2019 103 70 - 110 mg/dL Final     BUN, Bld   Date Value Ref Range Status   09/12/2019 26 (H) 8 - 23 mg/dL Final     Creatinine   Date Value Ref Range Status   09/12/2019 1.1 0.5 - 1.4 mg/dL Final     Calcium   Date Value Ref Range Status   09/12/2019 8.7 8.7 - 10.5 mg/dL Final     Total Protein   Date Value Ref Range Status   09/12/2019 6.3 6.0 - 8.4 g/dL Final     Albumin   Date Value Ref Range Status   09/12/2019 3.4 (L) 3.5 - 5.2 g/dL Final     Total Bilirubin   Date Value Ref Range Status   09/12/2019 0.3 0.1 - 1.0 mg/dL Final     Comment:     For infants and newborns, interpretation of results should be based  on gestational age, weight and in agreement with clinical  observations.  Premature Infant recommended reference ranges:  Up to 24 hours.............<8.0 mg/dL  Up to 48 hours............<12.0 mg/dL  3-5 days..................<15.0 mg/dL  6-29 days.................<15.0 mg/dL       Alkaline Phosphatase   Date Value Ref Range Status   09/12/2019 88 55 - 135 U/L Final     AST   Date Value Ref Range Status   09/12/2019 15 10 - 40 U/L Final     ALT   Date Value Ref Range Status   09/12/2019 13 10 - 44 U/L Final     Anion Gap   Date Value Ref Range Status   09/12/2019 8 8 - 16 mmol/L Final     eGFR if African  American   Date Value Ref Range Status   09/12/2019 54 (A) >60 mL/min/1.73 m^2 Final     eGFR if non    Date Value Ref Range Status   09/12/2019 47 (A) >60 mL/min/1.73 m^2 Final     Comment:     Calculation used to obtain the estimated glomerular filtration  rate (eGFR) is the CKD-EPI equation.        Lab Results   Component Value Date    [id number] 381 06/25/2018     Lab Results   Component Value Date    TSH 2.062 06/25/2018     Brain MRI    Abnormal signal in the cortex of the pre motor strip bilaterally (DWI+ve)          Reviewed the neuroimaging independently       Assessment:       1. Abnormal MRI    2. Cognitive decline        Plan:                 ABNORMAL CORTICAL SIGNAL ON BRAIN MRI            Possibilities and Plan:    Postictal: Order EEG. Monitor for seizures.    Stroke/Ischemia: Stroke GORMAN     Wernicke encephalopathy: Will start Thiamine 100 mg IV daily    Less likely Encephalitis and CJD: Obtain CSF analysis with encephalitis panel and Protein 14.3.3                             Blaise Guerrero MD, FAAN    Attending Neurologist/Epileptologist         Diplomate, American Board-Psychiatry and Neurology (Neurology)    Diplomate, American Board-Clinical Neurophysiology (Epilpesy-Neuromuscular)     Fellow, American Academy of Neurology

## 2019-09-12 NOTE — ED PROVIDER NOTES
"1:25 PM: Told to come to ER for abnormal brain mri.  Pt was placed back in [facility]. I explained to pt that due to lack of available rooms pt was seen by myself to begin the workup. Pt understands they will be seen and dispositioned by the next available physician. Pt voices understanding and agrees with plan. Pt also understands the longer than normal wait time. I am removing myself from the care of pt and pt will now be assigned to the next available physician.       SCRIBE #1 NOTE: I, Corinne Mack, am scribing for, and in the presence of, Lexii Oliver MD. I have scribed the entire note.      History      Chief Complaint   Patient presents with    Abnormal MRI     daughter states, "The doctor told us to bring her here for evaluation of her brain MRI."       Review of patient's allergies indicates:  No Known Allergies     HPI   HPI    9/12/2019, 2:39 PM   History obtained from the patient      History of Present Illness: Shannon Solano is a 79 y.o. female patient with PMHx of anemia, HTN, and stroke who presents to the Emergency Department for evaluation. Pt sees Dr. Guerrero (Neurology) for dementia sxs/evaluation. Pt saw Dr. Guerrero on 08/28/19 for bilateral foot numbness. Pt had an MRI done yesterday that showed possible stroke so Dr. Guerrero contacted the pt and advised her to present to the ED for evaluation and admission. Pt c/o bilateral foot numbness which onset gradually a few weeks ago. Symptoms are intermittent and moderate in severity. No mitigating or exacerbating factors reported. NO associated sxs reported. Patient denies any fever, chills, drooling, trouble swallowing, CP, SOB, N/V/D, abd pain, back pain, neck pain, HA, dizziness, weakness, speech difficulty, facial asymmetry, and all other sxs at this time. No prior Tx reported. No further complaints or concerns at this time.         Arrival mode: Personal vehicle    PCP: Leslie Poe MD       Past Medical History:  Past Medical History: "   Diagnosis Date    Anemia     Arthritis     Cataract     Disorder of kidney and ureter     Hyperlipidemia     Hypertension     Insomnia 8/28/2019    Osteoarthritis     Polyneuropathy     Stroke        Past Surgical History:  Past Surgical History:   Procedure Laterality Date    APPENDECTOMY      CATARACT EXTRACTION Bilateral 2014    EYE SURGERY Bilateral     HYSTERECTOMY      VARICOSE VEIN SURGERY           Family History:  Family History   Problem Relation Age of Onset    Breast cancer Sister     Heart disease Sister     Colon cancer Sister         age 70    Cancer Sister         breast    Obesity Mother     Stroke Mother     Obesity Brother     Alzheimer's disease Brother     Cancer Brother     No Known Problems Sister     Hypertension Daughter     Melanoma Neg Hx        Social History:  Social History     Tobacco Use    Smoking status: Never Smoker    Smokeless tobacco: Never Used   Substance and Sexual Activity    Alcohol use: No    Drug use: No    Sexual activity: Not Currently       ROS   Review of Systems   Constitutional: Negative for chills and fever.   HENT: Negative for drooling and trouble swallowing.    Respiratory: Negative for cough and shortness of breath.    Cardiovascular: Negative for chest pain and leg swelling.   Gastrointestinal: Negative for abdominal pain, diarrhea, nausea and vomiting.   Musculoskeletal: Negative for back pain, neck pain and neck stiffness.   Skin: Negative for rash and wound.   Neurological: Positive for numbness (bilateral feet). Negative for dizziness, facial asymmetry, speech difficulty, weakness, light-headedness and headaches.   All other systems reviewed and are negative.    Physical Exam      Initial Vitals [09/12/19 1324]   BP Pulse Resp Temp SpO2   (!) 168/78 73 20 98.6 °F (37 °C) 95 %      MAP       --          Physical Exam  Nursing Notes and Vital Signs Reviewed.  Constitutional: Patient is in no acute distress. Well-developed  "and well-nourished.  Head: Atraumatic. Normocephalic.  Eyes: PERRL. EOM intact. Conjunctivae are not pale. No scleral icterus.  ENT: Mucous membranes are moist. Oropharynx is clear and symmetric.    Neck: Supple. Full ROM. No lymphadenopathy.  Cardiovascular: Regular rate. Regular rhythm. No murmurs, rubs, or gallops. Distal pulses are 2+ and symmetric.  Pulmonary/Chest: No respiratory distress. Clear to auscultation bilaterally. No wheezing or rales.  Abdominal: Soft and non-distended.  There is no tenderness.  No rebound, guarding, or rigidity.   Musculoskeletal: Moves all extremities. No obvious deformities. No edema.   Skin: Warm and dry.  Neurological: Patient is alert and oriented to person, place and time. Pupils ERRL and EOM normal. Cranial nerves II-XII are intact. Strength is full bilaterally; it is equal and 5/5 in bilateral upper and lower extremities. Speech is clear and normal. No acute focal neurological deficits noted.  Psychiatric: Normal affect. Good eye contact. Appropriate in content.    ED Course    Procedures  ED Vital Signs:  Vitals:    09/12/19 1324 09/12/19 1450 09/12/19 1544 09/12/19 1618   BP: (!) 168/78 (!) 159/84 (!) 155/80 (!) 166/84   Pulse: 73 68 (!) 57 69   Resp: 20 20 20 20   Temp: 98.6 °F (37 °C)      TempSrc: Oral      SpO2: 95% 98% 97% 98%   Weight: 73.2 kg (161 lb 6 oz)      Height: 5' 6" (1.676 m)       09/12/19 1648 09/12/19 1721   BP: (!) 170/86 (!) 166/77   Pulse: 66 69   Resp: 18 20   Temp:  97.9 °F (36.6 °C)   TempSrc:  Oral   SpO2: 98% 98%   Weight:     Height:         Abnormal Lab Results:  Labs Reviewed   CBC W/ AUTO DIFFERENTIAL - Abnormal; Notable for the following components:       Result Value    Platelets 139 (*)     All other components within normal limits   COMPREHENSIVE METABOLIC PANEL - Abnormal; Notable for the following components:    BUN, Bld 26 (*)     Albumin 3.4 (*)     eGFR if  54 (*)     eGFR if non  47 (*)     All other " components within normal limits   URINALYSIS, REFLEX TO URINE CULTURE - Abnormal; Notable for the following components:    Leukocytes, UA 1+ (*)     All other components within normal limits    Narrative:     Preferred Collection Type->Urine, Clean Catch   PROTIME-INR   TSH   URINALYSIS MICROSCOPIC    Narrative:     Preferred Collection Type->Urine, Clean Catch   LIPID PANEL        All Lab Results:  Results for orders placed or performed during the hospital encounter of 09/12/19   CBC W/ AUTO DIFFERENTIAL   Result Value Ref Range    WBC 7.70 3.90 - 12.70 K/uL    RBC 4.16 4.00 - 5.40 M/uL    Hemoglobin 12.6 12.0 - 16.0 g/dL    Hematocrit 38.7 37.0 - 48.5 %    Mean Corpuscular Volume 93 82 - 98 fL    Mean Corpuscular Hemoglobin 30.3 27.0 - 31.0 pg    Mean Corpuscular Hemoglobin Conc 32.6 32.0 - 36.0 g/dL    RDW 14.0 11.5 - 14.5 %    Platelets 139 (L) 150 - 350 K/uL    MPV 9.8 9.2 - 12.9 fL    Gran # (ANC) 4.4 1.8 - 7.7 K/uL    Lymph # 2.3 1.0 - 4.8 K/uL    Mono # 0.8 0.3 - 1.0 K/uL    Eos # 0.2 0.0 - 0.5 K/uL    Baso # 0.04 0.00 - 0.20 K/uL    Gran% 57.3 38.0 - 73.0 %    Lymph% 30.1 18.0 - 48.0 %    Mono% 10.4 4.0 - 15.0 %    Eosinophil% 2.1 0.0 - 8.0 %    Basophil% 0.5 0.0 - 1.9 %    Differential Method Automated    Comprehensive metabolic panel   Result Value Ref Range    Sodium 144 136 - 145 mmol/L    Potassium 3.9 3.5 - 5.1 mmol/L    Chloride 110 95 - 110 mmol/L    CO2 26 23 - 29 mmol/L    Glucose 103 70 - 110 mg/dL    BUN, Bld 26 (H) 8 - 23 mg/dL    Creatinine 1.1 0.5 - 1.4 mg/dL    Calcium 8.7 8.7 - 10.5 mg/dL    Total Protein 6.3 6.0 - 8.4 g/dL    Albumin 3.4 (L) 3.5 - 5.2 g/dL    Total Bilirubin 0.3 0.1 - 1.0 mg/dL    Alkaline Phosphatase 88 55 - 135 U/L    AST 15 10 - 40 U/L    ALT 13 10 - 44 U/L    Anion Gap 8 8 - 16 mmol/L    eGFR if African American 54 (A) >60 mL/min/1.73 m^2    eGFR if non African American 47 (A) >60 mL/min/1.73 m^2   Protime-INR   Result Value Ref Range    Prothrombin Time 10.9 9.0 -  12.5 sec    INR 1.0 0.8 - 1.2   TSH   Result Value Ref Range    TSH 1.374 0.400 - 4.000 uIU/mL   Urinalysis, Reflex to Urine Culture Urine, Clean Catch   Result Value Ref Range    Specimen UA Urine, Clean Catch     Color, UA Yellow Yellow, Straw, Madai    Appearance, UA Clear Clear    pH, UA 6.0 5.0 - 8.0    Specific Gravity, UA 1.010 1.005 - 1.030    Protein, UA Negative Negative    Glucose, UA Negative Negative    Ketones, UA Negative Negative    Bilirubin (UA) Negative Negative    Occult Blood UA Negative Negative    Nitrite, UA Negative Negative    Urobilinogen, UA Negative <2.0 EU/dL    Leukocytes, UA 1+ (A) Negative   Urinalysis Microscopic   Result Value Ref Range    RBC, UA 2 0 - 4 /hpf    WBC, UA 3 0 - 5 /hpf    Microscopic Comment SEE COMMENT        Imaging Results:  Imaging Results          US Carotid Bilateral (Final result)  Result time 09/12/19 16:07:08    Final result by Kole Clark MD (09/12/19 16:07:08)                 Impression:      No evidence of a hemodynamically significant carotid bifurcation stenosis.      Electronically signed by: Kole Clark  Date:    09/12/2019  Time:    16:07             Narrative:    EXAMINATION:  US CAROTID BILATERAL    CLINICAL HISTORY:  Other symptoms and signs involving cognitive functions and awareness    TECHNIQUE:  Grayscale and color Doppler ultrasound examination of the carotid and vertebral artery systems bilaterally.  Stenosis estimates are per the NASCET measurement criteria.    COMPARISON:  None.    FINDINGS:  Right:    Internal Carotid Artery (ICA) peak systolic velocity 78.3 cm/sec    ICA/CCA peak systolic velocity ratio: 1.1    Plaque formation: Heterogeneous    Vertebral artery: Antegrade flow and normal waveform.    Left:    Internal Carotid Artery (ICA)  peak systolic velocity 86.7 cm/sec    ICA/CCA peak systolic velocity ratio: 1.3    Plaque formation: Heterogeneous    Vertebral artery: Antegrade flow and normal waveform.                                X-Ray Chest AP Portable (Final result)  Result time 09/12/19 14:36:36    Final result by Fredo Randhawa MD (09/12/19 14:36:36)                 Impression:      No acute cardiopulmonary disease.      Electronically signed by: Fredo Randhawa MD  Date:    09/12/2019  Time:    14:36             Narrative:    EXAMINATION:  XR CHEST AP PORTABLE    CLINICAL HISTORY:  Stroke;    TECHNIQUE:  Single frontal view of the chest was performed.    COMPARISON:  06/25/2018    FINDINGS:  Coarsening of bronchovascular markings/COPD.  No pleural effusion or pneumothorax.    The cardiac silhouette is upper limits of normal in size.  The hilar and mediastinal contours are unremarkable.                                 The EKG was ordered, reviewed, and independently interpreted by the ED provider.  Interpretation time: 1445  Rate: 65 BPM  Rhythm: normal sinus rhythm and occasional PVC and PAC  Interpretation: No acute ST abnormalities. No STEMI.             The Emergency Provider reviewed the vital signs and test results, which are outlined above.    ED Discussion     2:39 PM: Dr. Guerrero (Neurology) is at the pt bedside.    2:44 PM: Re-evaluated pt. I have discussed test results, shared treatment plan, and the need for admission with patient and family at bedside. Pt and family express understanding at this time and agree with all information. All questions answered. Pt and family have no further questions or concerns at this time. Pt is ready for admit.    4:02 PM: Discussed case with ANASTACIO Gaytan (Hospital Medicine). Dr. Miller agrees with current care and management of pt and accepts admission.   Admitting Service: Hospital medicine   Admitting Physician: Dr. Miller  Admit to: Obs Med Tele        ED Medication(s):  Medications   thiamine (B-1) 100 mg in dextrose 5 % 50 mL IVPB (100 mg Intravenous New Bag 9/12/19 1640)          Medication List      ASK your doctor about these medications    aspirin 81 MG EC tablet  Commonly known  as:  ECOTRIN     irbesartan 150 MG tablet  Commonly known as:  AVAPRO  Take 2 tablets (300 mg total) by mouth every evening.     memantine 10 MG Tab  Commonly known as:  NAMENDA  Take 1 tablet (10 mg total) by mouth 2 (two) times daily.     ONE DAILY MULTIVITAMIN per tablet  Generic drug:  multivitamin     pravastatin 40 MG tablet  Commonly known as:  PRAVACHOL  TAKE 1 TABLET BY MOUTH EVERY DAY     traZODone 50 MG tablet  Commonly known as:  DESYREL  Take 1 tablet (50 mg total) by mouth every evening.     tretinoin 0.025 % cream  Commonly known as:  RETIN-A  Apply topically every evening.                  Medical Decision Making           Medical Decision Making:   Clinical Tests:   Lab Tests: Ordered and Reviewed  Radiological Study: Ordered and Reviewed  Medical Tests: Ordered and Reviewed           Scribe Attestation:   Scribe #1: I performed the above scribed service and the documentation accurately describes the services I performed. I attest to the accuracy of the note 09/12/2019.    Attending:   Physician Attestation Statement for Scribe #1: I, Lexii Oliver MD, personally performed the services described in this documentation, as scribed by Corinne Mack, in my presence, and it is both accurate and complete.          Clinical Impression       ICD-10-CM ICD-9-CM   1. Abnormal MRI R93.89 793.99   2. Cognitive decline R41.89 294.9   3. Dyslipidemia E78.5 272.4   4. Hypertension, unspecified type I10 401.9   5. History of stroke 1/16/13 Rt basal ganglia Z86.73 V12.54   6. Keratosis, seborrheic L82.1 702.19   7. Acromioclavicular joint arthritis, unspecified laterality M19.019 716.91   8. Venous insufficiency I87.2 459.81   9. CKD (chronic kidney disease) stage 3, GFR 30-59 ml/min N18.3 585.3   10. Pre-diabetes R73.03 790.29   11. Bilateral carotid artery disease, unspecified type I77.9 447.9   12. Bilateral shoulder pain, unspecified chronicity M25.511 719.41    M25.512    13. Left arm weakness R29.898  729.89   14. Neuropathy of both feet G57.93 356.9   15. CSPN (cryptogenic sensory polyneuropathy) G60.8 356.2   16. Mild neurocognitive disorder G31.84 331.83   17. Insomnia, unspecified type G47.00 780.52       Disposition:   Disposition: Placed in Observation  Condition: Denise Oliver MD  09/12/19 9650

## 2019-09-12 NOTE — TELEPHONE ENCOUNTER
----- Message from Blaise Guerrero MD sent at 9/12/2019 11:44 AM CDT -----  09-    Brain MRI shows B/L cortical abnormalities that could suggest stroke.    The patient needs to come to the ER and get admitted for evaluation of stroke, seizures and CNS infections as well.

## 2019-09-12 NOTE — TELEPHONE ENCOUNTER
Advised daughter of MRI results and MD recommendations . She stated they are headed to the [facility] ER !

## 2019-09-13 VITALS
SYSTOLIC BLOOD PRESSURE: 186 MMHG | TEMPERATURE: 98 F | WEIGHT: 165.38 LBS | OXYGEN SATURATION: 96 % | HEART RATE: 56 BPM | DIASTOLIC BLOOD PRESSURE: 88 MMHG | RESPIRATION RATE: 16 BRPM | HEIGHT: 66 IN | BODY MASS INDEX: 26.58 KG/M2

## 2019-09-13 LAB
ALBUMIN SERPL BCP-MCNC: 3.4 G/DL (ref 3.5–5.2)
ALP SERPL-CCNC: 88 U/L (ref 55–135)
ALT SERPL W/O P-5'-P-CCNC: 13 U/L (ref 10–44)
ANION GAP SERPL CALC-SCNC: 8 MMOL/L (ref 8–16)
APTT BLDCRRT: 28.7 SEC (ref 21–32)
AST SERPL-CCNC: 16 U/L (ref 10–40)
BASOPHILS # BLD AUTO: 0.02 K/UL (ref 0–0.2)
BASOPHILS NFR BLD: 0.3 % (ref 0–1.9)
BILIRUB SERPL-MCNC: 0.4 MG/DL (ref 0.1–1)
BUN SERPL-MCNC: 21 MG/DL (ref 8–23)
CALCIUM SERPL-MCNC: 8.8 MG/DL (ref 8.7–10.5)
CHLORIDE SERPL-SCNC: 107 MMOL/L (ref 95–110)
CHOLEST SERPL-MCNC: 185 MG/DL (ref 120–199)
CHOLEST/HDLC SERPL: 4.9 {RATIO} (ref 2–5)
CK MB SERPL-MCNC: 1.6 NG/ML (ref 0.1–6.5)
CK MB SERPL-RTO: 3.1 % (ref 0–5)
CK SERPL-CCNC: 51 U/L (ref 20–180)
CLARITY CSF: CLEAR
CO2 SERPL-SCNC: 27 MMOL/L (ref 23–29)
COLOR CSF: COLORLESS
CREAT SERPL-MCNC: 0.9 MG/DL (ref 0.5–1.4)
DIFFERENTIAL METHOD: ABNORMAL
EOSINOPHIL # BLD AUTO: 0.2 K/UL (ref 0–0.5)
EOSINOPHIL NFR BLD: 3.2 % (ref 0–8)
ERYTHROCYTE [DISTWIDTH] IN BLOOD BY AUTOMATED COUNT: 14 % (ref 11.5–14.5)
EST. GFR  (AFRICAN AMERICAN): >60 ML/MIN/1.73 M^2
EST. GFR  (NON AFRICAN AMERICAN): >60 ML/MIN/1.73 M^2
ESTIMATED AVG GLUCOSE: 114 MG/DL (ref 68–131)
GLUCOSE CSF-MCNC: 56 MG/DL (ref 40–70)
GLUCOSE SERPL-MCNC: 87 MG/DL (ref 70–110)
HBA1C MFR BLD HPLC: 5.6 % (ref 4–5.6)
HCT VFR BLD AUTO: 40.6 % (ref 37–48.5)
HDLC SERPL-MCNC: 38 MG/DL (ref 40–75)
HDLC SERPL: 20.5 % (ref 20–50)
HGB BLD-MCNC: 13.2 G/DL (ref 12–16)
INR PPP: 1 (ref 0.8–1.2)
LDLC SERPL CALC-MCNC: 110.8 MG/DL (ref 63–159)
LYMPHOCYTES # BLD AUTO: 2.6 K/UL (ref 1–4.8)
LYMPHOCYTES NFR BLD: 35.3 % (ref 18–48)
MAGNESIUM SERPL-MCNC: 2.2 MG/DL (ref 1.6–2.6)
MCH RBC QN AUTO: 30.3 PG (ref 27–31)
MCHC RBC AUTO-ENTMCNC: 32.5 G/DL (ref 32–36)
MCV RBC AUTO: 93 FL (ref 82–98)
MONOCYTES # BLD AUTO: 0.7 K/UL (ref 0.3–1)
MONOCYTES NFR BLD: 9.9 % (ref 4–15)
NEUTROPHILS # BLD AUTO: 3.8 K/UL (ref 1.8–7.7)
NEUTROPHILS NFR BLD: 51.8 % (ref 38–73)
NONHDLC SERPL-MCNC: 147 MG/DL
PHOSPHATE SERPL-MCNC: 3.1 MG/DL (ref 2.7–4.5)
PLATELET # BLD AUTO: 145 K/UL (ref 150–350)
PMV BLD AUTO: 10.3 FL (ref 9.2–12.9)
POTASSIUM SERPL-SCNC: 4.1 MMOL/L (ref 3.5–5.1)
PROT CSF-MCNC: 30 MG/DL (ref 15–40)
PROT SERPL-MCNC: 6.4 G/DL (ref 6–8.4)
PROTHROMBIN TIME: 10.6 SEC (ref 9–12.5)
RBC # BLD AUTO: 4.36 M/UL (ref 4–5.4)
RBC # CSF: 58 /CU MM
SODIUM SERPL-SCNC: 142 MMOL/L (ref 136–145)
SPECIMEN VOL CSF: 2 ML
TRIGL SERPL-MCNC: 181 MG/DL (ref 30–150)
TROPONIN I SERPL DL<=0.01 NG/ML-MCNC: 0.01 NG/ML (ref 0–0.03)
WBC # BLD AUTO: 7.3 K/UL (ref 3.9–12.7)
WBC # CSF: 2 /CU MM (ref 0–5)

## 2019-09-13 PROCEDURE — 92610 EVALUATE SWALLOWING FUNCTION: CPT | Mod: HCNC

## 2019-09-13 PROCEDURE — 86653 ENCEPHALTIS ST LOUIS ANTBODY: CPT | Mod: HCNC

## 2019-09-13 PROCEDURE — 99233 PR SUBSEQUENT HOSPITAL CARE,LEVL III: ICD-10-PCS | Mod: HCNC,,, | Performed by: PSYCHIATRY & NEUROLOGY

## 2019-09-13 PROCEDURE — 82945 GLUCOSE OTHER FLUID: CPT | Mod: HCNC

## 2019-09-13 PROCEDURE — 82553 CREATINE MB FRACTION: CPT | Mod: HCNC

## 2019-09-13 PROCEDURE — 99000 SPECIMEN HANDLING OFFICE-LAB: CPT | Mod: HCNC

## 2019-09-13 PROCEDURE — 95819 PR EEG,W/AWAKE & ASLEEP RECORD: ICD-10-PCS | Mod: 26,HCNC,, | Performed by: PSYCHIATRY & NEUROLOGY

## 2019-09-13 PROCEDURE — 85025 COMPLETE CBC W/AUTO DIFF WBC: CPT | Mod: HCNC

## 2019-09-13 PROCEDURE — 89051 BODY FLUID CELL COUNT: CPT | Mod: HCNC

## 2019-09-13 PROCEDURE — 63600175 PHARM REV CODE 636 W HCPCS: Mod: HCNC | Performed by: EMERGENCY MEDICINE

## 2019-09-13 PROCEDURE — 82550 ASSAY OF CK (CPK): CPT | Mod: HCNC

## 2019-09-13 PROCEDURE — 84157 ASSAY OF PROTEIN OTHER: CPT | Mod: HCNC

## 2019-09-13 PROCEDURE — 25000003 PHARM REV CODE 250: Mod: HCNC | Performed by: EMERGENCY MEDICINE

## 2019-09-13 PROCEDURE — 97530 THERAPEUTIC ACTIVITIES: CPT | Mod: HCNC

## 2019-09-13 PROCEDURE — 63600175 PHARM REV CODE 636 W HCPCS: Mod: HCNC | Performed by: PSYCHIATRY & NEUROLOGY

## 2019-09-13 PROCEDURE — 95816 EEG AWAKE AND DROWSY: CPT | Mod: HCNC

## 2019-09-13 PROCEDURE — 83735 ASSAY OF MAGNESIUM: CPT | Mod: HCNC

## 2019-09-13 PROCEDURE — 99233 SBSQ HOSP IP/OBS HIGH 50: CPT | Mod: HCNC,,, | Performed by: PSYCHIATRY & NEUROLOGY

## 2019-09-13 PROCEDURE — 85610 PROTHROMBIN TIME: CPT | Mod: HCNC,ER

## 2019-09-13 PROCEDURE — 85730 THROMBOPLASTIN TIME PARTIAL: CPT | Mod: HCNC,ER

## 2019-09-13 PROCEDURE — 94761 N-INVAS EAR/PLS OXIMETRY MLT: CPT | Mod: HCNC

## 2019-09-13 PROCEDURE — 97165 OT EVAL LOW COMPLEX 30 MIN: CPT | Mod: HCNC

## 2019-09-13 PROCEDURE — 97802 MEDICAL NUTRITION INDIV IN: CPT | Mod: HCNC

## 2019-09-13 PROCEDURE — 80053 COMPREHEN METABOLIC PANEL: CPT | Mod: HCNC

## 2019-09-13 PROCEDURE — 95819 EEG AWAKE AND ASLEEP: CPT | Mod: 26,HCNC,, | Performed by: PSYCHIATRY & NEUROLOGY

## 2019-09-13 PROCEDURE — 84484 ASSAY OF TROPONIN QUANT: CPT | Mod: HCNC

## 2019-09-13 PROCEDURE — 87529 HSV DNA AMP PROBE: CPT | Mod: HCNC

## 2019-09-13 PROCEDURE — 36415 COLL VENOUS BLD VENIPUNCTURE: CPT | Mod: HCNC

## 2019-09-13 PROCEDURE — 97161 PT EVAL LOW COMPLEX 20 MIN: CPT | Mod: HCNC

## 2019-09-13 PROCEDURE — 84100 ASSAY OF PHOSPHORUS: CPT | Mod: HCNC

## 2019-09-13 PROCEDURE — 92523 SPEECH SOUND LANG COMPREHEN: CPT | Mod: HCNC

## 2019-09-13 RX ORDER — ATORVASTATIN CALCIUM 40 MG/1
40 TABLET, FILM COATED ORAL DAILY
Qty: 90 TABLET | Refills: 3 | Status: SHIPPED | OUTPATIENT
Start: 2019-09-14 | End: 2020-10-28 | Stop reason: SDUPTHER

## 2019-09-13 RX ORDER — LANOLIN ALCOHOL/MO/W.PET/CERES
100 CREAM (GRAM) TOPICAL DAILY
Qty: 60 TABLET | Refills: 1 | Status: SHIPPED | OUTPATIENT
Start: 2019-09-13 | End: 2020-08-21 | Stop reason: SDUPTHER

## 2019-09-13 RX ORDER — THIAMINE HCL 100 MG
100 TABLET ORAL DAILY
Status: DISCONTINUED | OUTPATIENT
Start: 2019-09-14 | End: 2019-09-13 | Stop reason: HOSPADM

## 2019-09-13 RX ADMIN — ASPIRIN 81 MG: 81 TABLET, COATED ORAL at 11:09

## 2019-09-13 RX ADMIN — ENOXAPARIN SODIUM 40 MG: 100 INJECTION SUBCUTANEOUS at 05:09

## 2019-09-13 RX ADMIN — THIAMINE HYDROCHLORIDE 100 MG: 100 INJECTION, SOLUTION INTRAMUSCULAR; INTRAVENOUS at 11:09

## 2019-09-13 RX ADMIN — ATORVASTATIN CALCIUM 40 MG: 40 TABLET, FILM COATED ORAL at 11:09

## 2019-09-13 NOTE — PT/OT/SLP EVAL
Speech Language Pathology Evaluation  Cognitive/Bedside Swallow    Patient Name:  Shannon Solano   MRN:  [id number]  Admitting Diagnosis: Abnormal MRI    Recommendations:                  General Recommendations:  Follow-up not indicated  Diet recommendations:  Regular, Thin   Aspiration Precautions: HOB to 90 degrees and Remain upright 30 minutes post meal   General Precautions: Standard, fall  Communication strategies:  none    History:     Past Medical History:   Diagnosis Date    Anemia     Arthritis     Cataract     Disorder of kidney and ureter     Hyperlipidemia     Hypertension     Insomnia 8/28/2019    Osteoarthritis     Polyneuropathy     Stroke        Past Surgical History:   Procedure Laterality Date    APPENDECTOMY      CATARACT EXTRACTION Bilateral 2014    EYE SURGERY Bilateral     HYSTERECTOMY      VARICOSE VEIN SURGERY         Social History: Patient lives with her  and reports being Independent with all ADLs.  Pt reported no dysphagia and eating a regular consistency diet PTA.      Subjective     Pt was seen sitting on the side of the bed with her  present.    Patient goals: none reported      Pain/Comfort:  · Pain Rating 1: 0/10    Objective:     Cognitive Status:    WFL       Receptive Language:   Comprehension:      WFL    Pragmatics:    WFL    Expressive Language:  Verbal:    WFL      Motor Speech:  WFL    Voice:   WFL    Visual-Spatial:  WFL    Oral Musculature Evaluation  · Oral Musculature: WFL  · Dentition: present and adequate    Bedside Swallow Eval:   Consistencies Assessed:  · Thin liquids and solids      Oral Phase:   · WFL    Pharyngeal Phase:   · no overt clinical signs/symptoms of aspiration  · no overt clinical signs/symptoms of pharyngeal dysphagia      Assessment:     Shannon Solano is a 79 y.o. female with an SLP diagnosis of WFL speech and language skills.  She presents with no signs of swallow difficulty or communication impairement.  No further ST  warranted at this time.       Plan:     · Plan of Care reviewed with:  patient   · SLP Follow-Up:  No       Discharge recommendations:  Discharge Facility/Level of Care Needs: home   Barriers to Discharge:  None    Time Tracking:     SLP Treatment Date:   09/13/19  Speech Start Time:  1210  Speech Stop Time:  1235     Speech Total Time (min):  25 min    Billable Minutes: Eval 15  and Eval Swallow and Oral Function 10    Yamila Ortiz CCC-SLP  09/13/2019

## 2019-09-13 NOTE — DISCHARGE SUMMARY
[facility] Hospital Medicine  Discharge Summary      Patient Name: Shannon Solano  MRN: [id number]  Admission Date: 9/12/2019  Hospital Length of Stay: 1 days  Discharge Date and Time:  09/13/2019 6:04 PM  Attending Physician: Giselle Miller MD   Discharging Provider: Giselle Miller MD  Primary Care Provider: Leslie Poe MD      HPI:   Mrs. Solano is a 78 yo WF who was sent by her Neurologist Dr. Guerrero for abnormal MRI. It appears she saw him a few days ago for bilateral feet numbness which started a few weeks ago. She has some underlying dementia. Currently her only complaint is numbness/tingling of her feet.    * No surgery found *      Hospital Course:   Mrs. Solano is a 78 yo WF who was sent by her Neurologist Dr. Guerrero for abnormal MRI. It appears she saw him a few days ago for bilateral feet numbness which started a few weeks ago. She has some underlying dementia. Currently her only complaint is numbness/tingling of her feet. Her MRI showed an abnormal signal in the Premotor Cortex B suggestive of possible CVA. She was admitted, LP was done which was completely normal with normal opening pressure. Her CSF fluid count is normal-- rest of the studies are pending. EEG was also done-- results pending. Carotid doppler normal B. She has no neuro deficits and she is eating, drinking, talking and walking around normally and is totally asymptomatic. She is desperate to be discharged home today and has been cleared by Dr. Guerrero for discharge. She was seen and examined and deemed stable for discharge home today.      Consults:   Consults (From admission, onward)        Status Ordering Provider     Inpatient consult to neurology  Once     Provider:  Blaise Guerrero MD    Completed JEAN-CLAUDE GA     Inpatient consult to Registered Dietitian/Nutritionist  Once     Provider:  (Not yet assigned)    Completed GISELLE MILLER consult to case management/social work  Once     Provider:   (Not yet assigned)    Completed DONALD OKEEFE          No new Assessment & Plan notes have been filed under this hospital service since the last note was generated.  Service: Hospital Medicine    Final Active Diagnoses:    Diagnosis Date Noted POA    PRINCIPAL PROBLEM:  Abnormal MRI [R93.89] 09/12/2019 Yes    Dyslipidemia [E78.5] 09/19/2013 Yes     Chronic      Problems Resolved During this Admission:       Discharged Condition: stable    Disposition: Home or Self Care    Follow Up:  Follow-up Information     Leslie Poe MD. Schedule an appointment as soon as possible for a visit in 1 week.    Specialty:  Family Medicine  Why:  Hospital follow up  Contact information:  [address] [facility] [id number] [phone]             Blaise Guerrero MD. Schedule an appointment as soon as possible for a visit in 1 week.    Specialty:  Neurology  Why:  Hospital follow up  Contact information:  [address] [place] [address] [address] Luisa [address]ING [address]  [phone]                 Patient Instructions:      Diet Cardiac     Activity as tolerated       Significant Diagnostic Studies: Labs:   BMP:   Recent Labs   Lab 09/12/19  1505 09/13/19  0407    87    142   K 3.9 4.1    107   CO2 26 27   BUN 26* 21   CREATININE 1.1 0.9   CALCIUM 8.7 8.8   MG  --  2.2   , CMP   Recent Labs   Lab 09/12/19  1505 09/13/19  0407    142   K 3.9 4.1    107   CO2 26 27    87   BUN 26* 21   CREATININE 1.1 0.9   CALCIUM 8.7 8.8   PROT 6.3 6.4   ALBUMIN 3.4* 3.4*   BILITOT 0.3 0.4   ALKPHOS 88 88   AST 15 16   ALT 13 13   ANIONGAP 8 8   ESTGFRAFRICA 54* >60   EGFRNONAA 47* >60   , CBC   Recent Labs   Lab 09/12/19  1505 09/13/19  0407   WBC 7.70 7.30   HGB 12.6 13.2   HCT 38.7 40.6   * 145*   , Lipid Panel   Lab Results   Component Value Date    CHOL 185 09/12/2019    HDL 38 (L) 09/12/2019    LDLCALC 110.8 09/12/2019    TRIG 181 (H) 09/12/2019    CHOLHDL 20.5 09/12/2019    and All labs within  the past 24 hours have been reviewed  Cardiac Graphics: Echocardiogram:   2D echo with color flow doppler:   Results for orders placed or performed in visit on 08/13/18   2D Echo w/ Color Flow Doppler   Result Value Ref Range    QEF 55 55 - 65    Mitral Valve Regurgitation MODERATE (A)     Diastolic Dysfunction No     Aortic Valve Regurgitation MILD     Est. PA Systolic Pressure 26.04     Mitral Valve Mobility NORMAL     Narrative    Date of Procedure: 08/13/2018        TEST DESCRIPTION       Aorta: The aortic root is normal in size, measuring 3.0 cm at sinotubular junction and 2.8 cm at Sinuses of Valsalva.     Left Atrium: The left atrial volume index is normal, measuring 25.14 cc/m2.     Left Ventricle: The left ventricle is normal in size, with an end-diastolic diameter of 4.9 cm, and an end-systolic diameter of 3.5 cm. LV wall thickness is normal, with the septum measuring 1.1 cm and the posterior wall measuring 0.8 cm across. Relative   wall thickness was normal at 0.33, and the LV mass index was increased at 105.0 g/m2 consistent with eccentric left ventricular hypertrophy. There are no regional wall motion abnormalities. Left ventricular systolic function appears normal. Visually   estimated ejection fraction is 55-60%. The LV Doppler derived stroke volume equals 48.0 ccs.     Diastolic indices: E wave velocity 0.5 m/s, E/A ratio 0.7,  msec., E/e' ratio(avg) 10. Diastolic function is normal.     Right Atrium: The right atrium is normal in size, measuring 4.6 cm in length and 3.7 cm in width in the apical view.     Right Ventricle: The right ventricle is normal in size measuring 3.7 cm at the base in the apical right ventricle-focused view. Global right ventricular systolic function appears normal. The estimated PA systolic pressure is 26 mmHg.     Aortic Valve:  The aortic valve is moderately sclerotic. Additionally, there is mild aortic regurgitation.     Mitral Valve:  The mitral valve is normal in  structure with normal leaflet mobility. There is moderate mitral regurgitation.     Tricuspid Valve:  The tricuspid valve is normal in structure.     Pulmonary Valve:  The pulmonic valve is normal in structure.     IVC: IVC is normal in size and collapses > 50% with a sniff, suggesting normal right atrial pressure of 3 mmHg.     Intracavitary: There is no evidence of pericardial effusion, intracavity mass, thrombi, or vegetation.         CONCLUSIONS     1 - No wall motion abnormalities.     2 - Normal left ventricular systolic function (EF 55-60%).     3 - Normal left ventricular diastolic function.     4 - Normal right ventricular systolic function .     5 - Mild aortic regurgitation.     6 - Moderate mitral regurgitation.     7 - The estimated PA systolic pressure is 26 mmHg.             This document has been electronically    SIGNED BY: Austin Stovall MD On: 08/13/2018 16:25     Imaging Results          US Carotid Bilateral (Final result)  Result time 09/12/19 16:07:08    Final result by Kole Clark MD (09/12/19 16:07:08)                 Impression:      No evidence of a hemodynamically significant carotid bifurcation stenosis.      Electronically signed by: Kole Clark  Date:    09/12/2019  Time:    16:07             Narrative:    EXAMINATION:  US CAROTID BILATERAL    CLINICAL HISTORY:  Other symptoms and signs involving cognitive functions and awareness    TECHNIQUE:  Grayscale and color Doppler ultrasound examination of the carotid and vertebral artery systems bilaterally.  Stenosis estimates are per the NASCET measurement criteria.    COMPARISON:  None.    FINDINGS:  Right:    Internal Carotid Artery (ICA) peak systolic velocity 78.3 cm/sec    ICA/CCA peak systolic velocity ratio: 1.1    Plaque formation: Heterogeneous    Vertebral artery: Antegrade flow and normal waveform.    Left:    Internal Carotid Artery (ICA)  peak systolic velocity 86.7 cm/sec    ICA/CCA peak systolic velocity ratio:  1.3    Plaque formation: Heterogeneous    Vertebral artery: Antegrade flow and normal waveform.                               X-Ray Chest AP Portable (Final result)  Result time 09/12/19 14:36:36    Final result by Fredo Randhawa MD (09/12/19 14:36:36)                 Impression:      No acute cardiopulmonary disease.      Electronically signed by: Fredo Randhawa MD  Date:    09/12/2019  Time:    14:36             Narrative:    EXAMINATION:  XR CHEST AP PORTABLE    CLINICAL HISTORY:  Stroke;    TECHNIQUE:  Single frontal view of the chest was performed.    COMPARISON:  06/25/2018    FINDINGS:  Coarsening of bronchovascular markings/COPD.  No pleural effusion or pneumothorax.    The cardiac silhouette is upper limits of normal in size.  The hilar and mediastinal contours are unremarkable.                                Pending Diagnostic Studies:     Procedure Component Value Units Date/Time    14-3-3 Protein, CSF [[id number]] Collected:  09/13/19 1613    Order Status:  Sent Lab Status:  In process Updated:  09/13/19 1614    Specimen:  CSF (Spinal Fluid) from Cerebrospinal Fluid     14-3-3 Protein, CSF [[id number]] Collected:  09/13/19 1613    Order Status:  Sent Lab Status:  In process Updated:  09/13/19 1614    Specimen:  CSF (Spinal Fluid) from Cerebrospinal Fluid     Arbovirus Antibody Panel, CSF [[id number]] Collected:  09/13/19 1613    Order Status:  Sent Lab Status:  In process Updated:  09/13/19 1614    Specimen:  CSF (Spinal Fluid) from Cerebrospinal Fluid     FL Lumbar Puncture (xpd) [[id number]] Resulted:  09/13/19 1515    Order Status:  Sent Lab Status:  In process Updated:  09/13/19 1710    Glucose, CSF [[id number]] Collected:  09/13/19 1613    Order Status:  Sent Lab Status:  In process Updated:  09/13/19 1638    Specimen:  CSF (Spinal Fluid) from Cerebrospinal Fluid     Herpes Simplex (HSV) by PCR, CSF [[id number]] Collected:  09/13/19 1613    Order Status:  Sent Lab Status:  In process Updated:  09/13/19 1614     Specimen:  CSF (Spinal Fluid) from Cerebrospinal Fluid     Protein, CSF [[id number]] Collected:  09/13/19 1613    Order Status:  Sent Lab Status:  In process Updated:  09/13/19 1640    Specimen:  CSF (Spinal Fluid) from Cerebrospinal Fluid     VDRL, CSF [[id number]] Collected:  09/13/19 1613    Order Status:  Sent Lab Status:  In process Updated:  09/13/19 1614    Specimen:  CSF (Spinal Fluid) from Cerebrospinal Fluid          Medications:  Reconciled Home Medications:      Medication List      START taking these medications    atorvastatin 40 MG tablet  Commonly known as:  LIPITOR  Take 1 tablet (40 mg total) by mouth once daily.  Start taking on:  9/14/2019     folic acid-vit B6-vit B12 2.5-25-2 mg 2.5-25-2 mg Tab  Commonly known as:  FOLBIC or Equiv  Take 1 tablet by mouth once daily.     thiamine 100 MG tablet  Commonly known as:  VITAMIN B-1  Take 1 tablet (100 mg total) by mouth once daily.        CONTINUE taking these medications    aspirin 81 MG EC tablet  Commonly known as:  ECOTRIN  Take 81 mg by mouth once daily.     irbesartan 150 MG tablet  Commonly known as:  AVAPRO  Take 2 tablets (300 mg total) by mouth every evening.     memantine 10 MG Tab  Commonly known as:  NAMENDA  Take 1 tablet (10 mg total) by mouth 2 (two) times daily.     ONE DAILY MULTIVITAMIN per tablet  Generic drug:  multivitamin  Take 1 tablet by mouth once daily.     traZODone 50 MG tablet  Commonly known as:  DESYREL  Take 1 tablet (50 mg total) by mouth every evening.     tretinoin 0.025 % cream  Commonly known as:  RETIN-A  Apply topically every evening.        STOP taking these medications    pravastatin 40 MG tablet  Commonly known as:  PRAVACHOL            Indwelling Lines/Drains at time of discharge:   Lines/Drains/Airways          None          Time spent on the discharge of patient: 47 minutes  Patient was seen and examined on the date of discharge and determined to be suitable for discharge.         Giselle Miller  MD  Department of Hospital Medicine  [facility]

## 2019-09-13 NOTE — H&P
"[facility] Hospital Medicine  History & Physical    Patient Name: Shannon Solano  MRN: [id number]  Admission Date: 9/12/2019  Attending Physician: Giselle Miller MD   Primary Care Provider: Leslie Poe MD         Patient information was obtained from patient, past medical records and ER records.     Subjective:     Principal Problem:Abnormal MRI    Chief Complaint:   Chief Complaint   Patient presents with    Abnormal MRI     daughter states, "The doctor told us to bring her here for evaluation of her brain MRI."        HPI: Mrs. Solano is a 80 yo WF who was sent by her Neurologist Dr. Guerrero for abnormal MRI. It appears she saw him a few days ago for bilateral feet numbness which started a few weeks ago. She has some underlying dementia. Currently her only complaint is numbness/tingling of her feet.    Past Medical History:   Diagnosis Date    Anemia     Arthritis     Cataract     Disorder of kidney and ureter     Hyperlipidemia     Hypertension     Insomnia 8/28/2019    Osteoarthritis     Polyneuropathy     Stroke        Past Surgical History:   Procedure Laterality Date    APPENDECTOMY      CATARACT EXTRACTION Bilateral 2014    EYE SURGERY Bilateral     HYSTERECTOMY      VARICOSE VEIN SURGERY         Review of patient's allergies indicates:  No Known Allergies    No current facility-administered medications on file prior to encounter.      Current Outpatient Medications on File Prior to Encounter   Medication Sig    aspirin (ECOTRIN) 81 MG EC tablet Take 81 mg by mouth once daily.    irbesartan (AVAPRO) 150 MG tablet Take 2 tablets (300 mg total) by mouth every evening.    memantine (NAMENDA) 10 MG Tab Take 1 tablet (10 mg total) by mouth 2 (two) times daily.    pravastatin (PRAVACHOL) 40 MG tablet TAKE 1 TABLET BY MOUTH EVERY DAY    traZODone (DESYREL) 50 MG tablet Take 1 tablet (50 mg total) by mouth every evening.    multivitamin (ONE DAILY MULTIVITAMIN) per " tablet Take 1 tablet by mouth once daily.    tretinoin (RETIN-A) 0.025 % cream Apply topically every evening.     Family History     Problem Relation (Age of Onset)    Alzheimer's disease Brother    Breast cancer Sister    Cancer Sister, Brother    Colon cancer Sister    Heart disease Sister    Hypertension Daughter    No Known Problems Sister    Obesity Mother, Brother    Stroke Mother        Tobacco Use    Smoking status: Never Smoker    Smokeless tobacco: Never Used   Substance and Sexual Activity    Alcohol use: No    Drug use: No    Sexual activity: Not Currently     Review of Systems   Constitutional: Negative.    HENT: Negative.    Eyes: Negative.    Respiratory: Negative.    Cardiovascular: Negative.    Gastrointestinal: Negative.    Genitourinary: Negative.    Musculoskeletal: Negative.    Allergic/Immunologic: Negative.    Neurological: Positive for numbness (bilateral feet).   Hematological: Negative.    Psychiatric/Behavioral: Negative.      Objective:     Vital Signs (Most Recent):  Temp: 98.3 °F (36.8 °C) (09/12/19 1915)  Pulse: 60 (09/12/19 1915)  Resp: 20 (09/12/19 1915)  BP: (!) 174/80 (09/12/19 1915)  SpO2: 100 % (09/12/19 1915) Vital Signs (24h Range):  Temp:  [97.9 °F (36.6 °C)-98.6 °F (37 °C)] 98.3 °F (36.8 °C)  Pulse:  [57-73] 60  Resp:  [18-20] 20  SpO2:  [95 %-100 %] 100 %  BP: (155-180)/(77-86) 174/80     Weight: 73.2 kg (161 lb 6 oz)  Body mass index is 26.05 kg/m².    Physical Exam   Constitutional: She is oriented to person, place, and time. She appears well-developed and well-nourished.   HENT:   Head: Normocephalic and atraumatic.   Right Ear: External ear normal.   Left Ear: External ear normal.   Nose: Nose normal.   Mouth/Throat: Oropharynx is clear and moist. No oropharyngeal exudate.   Eyes: Pupils are equal, round, and reactive to light. Conjunctivae and EOM are normal.   Neck: Normal range of motion. Neck supple.   Cardiovascular: Normal rate, regular rhythm, normal heart  sounds and intact distal pulses. Exam reveals no gallop and no friction rub.   No murmur heard.  Pulmonary/Chest: Effort normal and breath sounds normal.   Abdominal: Soft. Bowel sounds are normal.   Genitourinary:   Genitourinary Comments: deferred   Musculoskeletal: Normal range of motion.   Neurological: She is alert and oriented to person, place, and time.   Skin: Skin is warm and dry. Capillary refill takes less than 2 seconds.   Psychiatric: She has a normal mood and affect.   Nursing note and vitals reviewed.        CRANIAL NERVES     CN III, IV, VI   Pupils are equal, round, and reactive to light.  Extraocular motions are normal.        Significant Labs:   Recent Lab Results       09/12/19  1728   09/12/19  1505        Albumin   3.4     Alkaline Phosphatase   88     ALT   13     Anion Gap   8     Appearance, UA Clear       AST   15     Baso #   0.04     Basophil%   0.5     Bilirubin (UA) Negative       BILIRUBIN TOTAL   0.3  Comment:  For infants and newborns, interpretation of results should be based  on gestational age, weight and in agreement with clinical  observations.  Premature Infant recommended reference ranges:  Up to 24 hours.............<8.0 mg/dL  Up to 48 hours............<12.0 mg/dL  3-5 days..................<15.0 mg/dL  6-29 days.................<15.0 mg/dL       BUN, Bld   26     Calcium   8.7     Chloride   110     CO2   26     Color, UA Yellow       Creatinine   1.1     Differential Method   Automated     eGFR if    54     eGFR if non    47  Comment:  Calculation used to obtain the estimated glomerular filtration  rate (eGFR) is the CKD-EPI equation.        Eos #   0.2     Eosinophil%   2.1     Glucose   103     Glucose, UA Negative       Gran # (ANC)   4.4     Gran%   57.3     Hematocrit   38.7     Hemoglobin   12.6     Coumadin Monitoring INR   1.0  Comment:  Coumadin Therapy:  2.0 - 3.0 for INR for all indicators except mechanical heart valves  and  antiphospholipid syndromes which should use 2.5 - 3.5.       Ketones, UA Negative       Leukocytes, UA 1+       Lymph #   2.3     Lymph%   30.1     MCH   30.3     MCHC   32.6     MCV   93     Microscopic Comment SEE COMMENT  Comment:  Other formed elements not mentioned in the report are not   present in the microscopic examination.          Mono #   0.8     Mono%   10.4     MPV   9.8     NITRITE UA Negative       Occult Blood UA Negative       pH, UA 6.0       Platelets   139     Potassium   3.9     PROTEIN TOTAL   6.3     Protein, UA Negative  Comment:  Recommend a 24 hour urine protein or a urine   protein/creatinine ratio if globulin induced proteinuria is  clinically suspected.         Protime   10.9     RBC   4.16     RBC, UA 2       RDW   14.0     Sodium   144     Specific Gravity, UA 1.010       Specimen UA Urine, Clean Catch       TSH   1.374     UROBILINOGEN UA Negative       WBC, UA 3       WBC   7.70           Significant Imaging:   Imaging Results          US Carotid Bilateral (Final result)  Result time 09/12/19 16:07:08    Final result by Kole Clark MD (09/12/19 16:07:08)                 Impression:      No evidence of a hemodynamically significant carotid bifurcation stenosis.      Electronically signed by: Kole Clark  Date:    09/12/2019  Time:    16:07             Narrative:    EXAMINATION:  US CAROTID BILATERAL    CLINICAL HISTORY:  Other symptoms and signs involving cognitive functions and awareness    TECHNIQUE:  Grayscale and color Doppler ultrasound examination of the carotid and vertebral artery systems bilaterally.  Stenosis estimates are per the NASCET measurement criteria.    COMPARISON:  None.    FINDINGS:  Right:    Internal Carotid Artery (ICA) peak systolic velocity 78.3 cm/sec    ICA/CCA peak systolic velocity ratio: 1.1    Plaque formation: Heterogeneous    Vertebral artery: Antegrade flow and normal waveform.    Left:    Internal Carotid Artery (ICA)  peak systolic velocity  86.7 cm/sec    ICA/CCA peak systolic velocity ratio: 1.3    Plaque formation: Heterogeneous    Vertebral artery: Antegrade flow and normal waveform.                               X-Ray Chest AP Portable (Final result)  Result time 09/12/19 14:36:36    Final result by Fredo Randhawa MD (09/12/19 14:36:36)                 Impression:      No acute cardiopulmonary disease.      Electronically signed by: Fredo Randhawa MD  Date:    09/12/2019  Time:    14:36             Narrative:    EXAMINATION:  XR CHEST AP PORTABLE    CLINICAL HISTORY:  Stroke;    TECHNIQUE:  Single frontal view of the chest was performed.    COMPARISON:  06/25/2018    FINDINGS:  Coarsening of bronchovascular markings/COPD.  No pleural effusion or pneumothorax.    The cardiac silhouette is upper limits of normal in size.  The hilar and mediastinal contours are unremarkable.                                  Assessment/Plan:     * Abnormal MRI  Per report concerning for acute CVA but clinically she has no focal deficits. Plan is to start ASA and a statin. Will only give BP medications for SBP>230 or DBP>110. Neurology has seen patient and will await further recommendations. PT/OT evaluation placed.      Dyslipidemia  Will resume her statin        VTE Risk Mitigation (From admission, onward)        Ordered     enoxaparin injection 40 mg  Daily      09/12/19 1921     IP VTE HIGH RISK PATIENT  Once      09/12/19 1921     Place sequential compression device  Until discontinued      09/12/19 1921             Rebekah Gtz MD  Department of Hospital Medicine   [facility]

## 2019-09-13 NOTE — PROCEDURES
LEVEL OF CONSCIOUSENESS    Awake and Sleep.       EEG BACKGROUND    The posterior dominant basic rhythm reaches 8-9 Hz, symmetric, reactive, well-modulated and well-sustained.       EEG CLASSIFICATION    Normal      IMPRESSION      The EEG is normal in the awake and sleep states.     There are no epileptiform discharges or lateralizing signs . No typical events were recorded. There is no electrographic evidence of seizure. There is no electrographic evidence of status epilpeticus.       Please note that a nonepileptiform EEG does not rule out epilepsy.         Blaise Guerrero MD, FAAN    Diplomate, American Board of Psychiatry and Neurology  Diplomate, American Board of Clinical Neurophysiology

## 2019-09-13 NOTE — PT/OT/SLP EVAL
Physical Therapy Evaluation    Patient Name:  Shannon Solano   MRN:  [id number]    Recommendations:     Discharge Recommendations:  home   Discharge Equipment Recommendations: none   Barriers to discharge: None    Assessment:     Shannon Solano is a 79 y.o. female admitted with a medical diagnosis of Abnormal MRI.      Recent Surgery: * No surgery found *      Plan:     · PT IS IND WITH GROSS FUNC MOBILITY AND WILL BE D/C FROM P.T. TO MOBILITY PROGRAM    Subjective     Chief Complaint: NONE   Patient/Family Comments/goals: NONE   Pain/Comfort:  · Pain Rating 1: 0/10  · Pain Rating Post-Intervention 1: 0/10  · Pain Rating Post-Intervention 2: 0/10    Patients cultural, spiritual, Lutheran conflicts given the current situation:      Living Environment:  PT LIVES AT HOME WITH  AND HAS NO STEPS TO ENTER HOME   Prior to admission, patients level of function was IND AND DRIVES.  Equipment used at home: none.  DME owned (not currently used): none.  Upon discharge, patient will have assistance from .    Objective:     Communicated with NURSE STRONG AND [facility] CHART REVIEW  prior to session.  Patient found supine with peripheral IV, telemetry  upon PT entry to room.    General Precautions: Standard,     Orthopedic Precautions:N/A   Braces: N/A     Exams:  · RLE ROM: WFL  · RLE Strength: WFL  · LLE ROM: WFL  · LLE Strength: WFL    Functional Mobility:  PT MET IN RM SEATED EOB IND. PT STOOD WITH NO AD AND GT TRAINED X 300' WITH NO AD IND. PT RETURNED TO RM AND T/F TO EOB. PT LEFT SEATED EOB WITH ALL NEEDS MET AND FAMILY PRESENT.     AM-PAC 6 CLICK MOBILITY  Total Score:21     Patient left SEATED EOB with call button in reach.    GOALS:   Multidisciplinary Problems     Physical Therapy Goals     Not on file                History:     Past Medical History:   Diagnosis Date    Anemia     Arthritis     Cataract     Disorder of kidney and ureter     Hyperlipidemia     Hypertension     Insomnia 8/28/2019     Osteoarthritis     Polyneuropathy     Stroke        Past Surgical History:   Procedure Laterality Date    APPENDECTOMY      CATARACT EXTRACTION Bilateral 2014    EYE SURGERY Bilateral     HYSTERECTOMY      VARICOSE VEIN SURGERY         Time Tracking:     PT Received On: 09/13/19  PT Start Time: 1156     PT Stop Time: 1215  PT Total Time (min): 19 min     Billable Minutes: Evaluation 19      Susy Gleason, PT  09/13/2019

## 2019-09-13 NOTE — PT/OT/SLP EVAL
Occupational Therapy   Evaluation and Discharge Note    Name: Shannon Solano  MRN: [id number]  Admitting Diagnosis:  Abnormal MRI      Recommendations:     Discharge Recommendations:    Discharge Equipment Recommendations:  none  Barriers to discharge:       Assessment:     Shannon Solano is a 79 y.o. female with a medical diagnosis of Abnormal MRI. At this time, patient is functioning at their prior level of function and does not require further acute OT services.     Plan:     During this hospitalization, patient does not require further acute OT services.  Please re-consult if situation changes.    · Plan of Care Reviewed with: patient    Subjective     Chief Complaint:   Patient/Family Comments/goals:     Occupational Profile:  Living Environment: LIVES WITH SPOUSE AND 1 STORY HOUSE WITH NO STEPS  Previous level of function: (I) WITH ADL'S AND FUNCTIONAL MOBILITY. PT REPORTS  [address]  Roles and Routines: OCCUPATIONAL THERAPY   Equipment Used at home:  none  Assistance upon Discharge:   Pain/Comfort:  · Pain Rating 1: 0/10    Patients cultural, spiritual, Evangelical conflicts given the current situation:      Objective:     Communicated with: NURSE AND EPIC CHART REVIEW prior to session.  Patient found HOB elevated with telemetry upon OT entry to room.    General Precautions: Standard, fall   Orthopedic Precautions:N/A   Braces: N/A     Occupational Performance:    Bed Mobility:    · Patient completed Rolling/Turning to Right with independence  · Patient completed Scooting/Bridging with independence  · Patient completed Supine to Sit with independence    Functional Mobility/Transfers:  · Patient completed Sit <> Stand Transfer with independence  with  no assistive device   · Functional Mobility: PT AMBULATED 200 FEET WITH (I) 'LY    Activities of Daily Living:  · Upper Body Dressing: independence .  · Lower Body Dressing: independence .    Cognitive/Visual Perceptual:  Cognitive/Psychosocial Skills:     -        Oriented to: Person, Place, Time and Situation   -       Follows Commands/attention:Follows multistep  commands  -       Communication: clear/fluent  -       Memory: No Deficits noted  -       Safety awareness/insight to disability: intact   Visual/Perceptual:      -Intact .    Physical Exam:  Upper Extremity Range of Motion:     -       Right Upper Extremity: WFL  -       Left Upper Extremity: WFL  Upper Extremity Strength:    -       Right Upper Extremity: WFL  -       Left Upper Extremity: WFL   Strength:    -       Right Upper Extremity: WFL  -       Left Upper Extremity: WFL    AMPAC 6 Click ADL:  AMPAC Total Score: 24    Treatment & Education:    Education:    Patient left SITTING EOB PER PT RESQUEST with all lines intact, call button in reach, NURSE notified and FAMILY present    GOALS:   Multidisciplinary Problems     Occupational Therapy Goals     Not on file                History:     Past Medical History:   Diagnosis Date    Anemia     Arthritis     Cataract     Disorder of kidney and ureter     Hyperlipidemia     Hypertension     Insomnia 8/28/2019    Osteoarthritis     Polyneuropathy     Stroke        Past Surgical History:   Procedure Laterality Date    APPENDECTOMY      CATARACT EXTRACTION Bilateral 2014    EYE SURGERY Bilateral     HYSTERECTOMY      VARICOSE VEIN SURGERY         Time Tracking:     OT Date of Treatment: 09/13/19  OT Start Time: 1110  OT Stop Time: 1134  OT Total Time (min): 24 min    Billable Minutes:Evaluation 12 MINUTES  Therapeutic Activity `12 MINUTES    Jennifer Squires OT  9/13/2019

## 2019-09-13 NOTE — CONSULTS
"  [facility] -   Adult Nutrition  Consult Note    SUMMARY     Recommendations    Recommendation/Intervention: 1. Continue current diet 2. Add Boost TID 3. RD to f/u  Intervention: Nutrition supplement therapy  Goals: Meet >85% EEN/EPN while admitted  Nutrition Goal Status: new  Communication of RD Recs: POC, sticky note    Reason for Assessment    Reason For Assessment: consult  Diagnosis: Abnormal MRI, HTN, CKD3  Relevant Medical History: Hyperlipidemia, HTN, Stroke  General Information Comments: RD consulted to assess dietary needs. Pt w/ providers at visit attempts. Pt w/ steady wt decline. Most recent wt  = 158lb on 19. [id number]=[id number]. NFPE + Malnutrition assessment to be completed at f/u  Nutrition Discharge Planning: Low Na diet    Nutrition Risk Screen    Nutrition Risk Screen: no indicators present    Nutrition/Diet History    Spiritual, Cultural Beliefs, Pentecostalism Practices, Values that Affect Care: no    Anthropometrics    Temp: 97.7 °F (36.5 °C)  Height Method: Stated  Height: 5' 6" (167.6 cm)  Height (inches): 66 in  Weight Method: Bed Scale  Weight: 75 kg (165 lb 5.5 oz)  Weight (lb): 165.35 lb  Ideal Body Weight (IBW), Female: 130 lb  % Ideal Body Weight, Female (lb): 127.19 lb  BMI (Calculated): 26.7  BMI Grade: 25 - 29.9 - overweight  Weight Loss: unintentional  Usual Body Weight (UBW), k.8 kg  % Usual Body Weight: 104.68  % Weight Change From Usual Weight: 4.46 %       Lab/Procedures/Meds    Pertinent Labs Reviewed: reviewed  BMP  Lab Results   Component Value Date     2019    K 4.1 2019     2019    CO2 27 2019    BUN 21 2019    CREATININE 0.9 2019    CALCIUM 8.8 2019    ANIONGAP 8 2019    ESTGFRAFRICA >60 2019    EGFRNONAA >60 2019     Lab Results   Component Value Date    CALCIUM 8.8 2019    PHOS 3.1 2019     Lab Results   Component Value Date    HGBA1C 5.6 2019     No results for input(s): " POCTGLUCOSE in the last 24 hours.  Lab Results   Component Value Date    ALBUMIN 3.4 (L) 09/13/2019       Pertinent Medications Reviewed: reviewed    Physical Findings/Assessment  Sung Score: 21    Estimated/Assessed Needs    Weight Used For Calorie Calculations: 75 kg (165 lb 5.5 oz)  Energy Calorie Requirements (kcal): 7221-5025  Energy Need Method: [place]-St Jeor  Protein Requirements: 75-90g  Weight Used For Protein Calculations: 75 kg (165 lb 5.5 oz)     Estimated Fluid Requirement Method: RDA Method(or per MD)  RDA Method (mL): 1477         Nutrition Prescription Ordered    Current Diet Order: Low Na Diet    Evaluation of Received Nutrient/Fluid Intake       % Intake of Estimated Energy Needs: 25 - 50 %  % Meal Intake: 25 - 50 %    Nutrition Risk  [id number]    Assessment and Plan    Nutrition Problem  Decreased nutrient needs (na, fat)    Related to (etiology):   Current medical diagnosis    Signs and Symptoms (as evidenced by):   Stroke, HTN    Interventions/Recommendations (treatment strategy):  See above    Nutrition Diagnosis Status:   New      Monitor and Evaluation    Food and Nutrient Intake: energy intake, food and beverage intake  Food and Nutrient Adminstration: diet order  Anthropometric Measurements: weight, weight change  Biochemical Data, Medical Tests and Procedures: electrolyte and renal panel, inflammatory profile, lipid profile, gastrointestinal profile, glucose/endocrine profile  Nutrition-Focused Physical Findings: overall appearance     Malnutrition Assessment     To be completed at f/u    Nutrition Follow-Up    RD Follow-up?: Yes

## 2019-09-13 NOTE — HOSPITAL COURSE
Mrs. Solano is a 80 yo WF who was sent by her Neurologist Dr. Guerrero for abnormal MRI. It appears she saw him a few days ago for bilateral feet numbness which started a few weeks ago. She has some underlying dementia. Currently her only complaint is numbness/tingling of her feet. Her MRI showed an abnormal signal in the Premotor Cortex B suggestive of possible CVA. She was admitted, LP was done which was completely normal with normal opening pressure. Her CSF fluid count is normal-- rest of the studies are pending. EEG was also done-- results pending. Carotid doppler normal B. She has no neuro deficits and she is eating, drinking, talking and walking around normally and is totally asymptomatic. She is desperate to be discharged home today and has been cleared by Dr. Guerrero for discharge. She was seen and examined and deemed stable for discharge home today.

## 2019-09-13 NOTE — PLAN OF CARE
Pt sent to the hospital for further evaluation after outpatient appointment with Dr. Guerrero in neurology.  The pt recently began to experience increased forgetfulness and after discussing with  decided to schedule an appointment with a neurologist to be examined and now is admitted for further examination.  The pt lives at home with her spouse and reports that both she and her spouse still remain independent with ADLs.  The pt also has a daughter who lives near her who assists them with transportation when needed.  At this point, d/c plan is still being determined but the pt's goal is to go home and continue to live independently.  CM provided a transitional care folder, information on advanced directives, information on pharmacy bedside delivery, and discharge planning begins on admission with contact information for any needs/questions.    D/C plan: to be determined       09/13/19 0946   Discharge Assessment   Assessment Type Discharge Planning Assessment   Confirmed/corrected address and phone number on facesheet? Yes   Assessment information obtained from? Patient;Medical Record   Expected Length of Stay (days)   (tbd)   Communicated expected length of stay with patient/caregiver yes   Prior to hospitilization cognitive status: Alert/Oriented   Prior to hospitalization functional status: Independent   Current cognitive status: Alert/Oriented   Current Functional Status: Independent   Facility Arrived From: home    Lives With spouse   Able to Return to Prior Arrangements yes   Is patient able to care for self after discharge? Yes   Who are your caregiver(s) and their phone number(s)? Bernabe Solano, spouse: [phone]; Sussy Harrington, daughter: [phone] (home), [phone] (mobile)    Patient's perception of discharge disposition home or selfcare   Readmission Within the Last 30 Days no previous admission in last 30 days   Patient currently being followed by outpatient case management? No   Patient  currently receives any other outside agency services? No   Equipment Currently Used at Home none   Do you have any problems affording any of your prescribed medications? No   Is the patient taking medications as prescribed? yes   Does the patient have transportation home? Yes   Transportation Anticipated family or friend will provide   Does the patient receive services at the [facility]? No   Discharge Plan A Home   Discharge Plan B Other  (to be determined)   DME Needed Upon Discharge  none   Patient/Family in Agreement with Plan yes

## 2019-09-13 NOTE — SUBJECTIVE & OBJECTIVE
Past Medical History:   Diagnosis Date    Anemia     Arthritis     Cataract     Disorder of kidney and ureter     Hyperlipidemia     Hypertension     Insomnia 8/28/2019    Osteoarthritis     Polyneuropathy     Stroke        Past Surgical History:   Procedure Laterality Date    APPENDECTOMY      CATARACT EXTRACTION Bilateral 2014    EYE SURGERY Bilateral     HYSTERECTOMY      VARICOSE VEIN SURGERY         Review of patient's allergies indicates:  No Known Allergies    No current facility-administered medications on file prior to encounter.      Current Outpatient Medications on File Prior to Encounter   Medication Sig    aspirin (ECOTRIN) 81 MG EC tablet Take 81 mg by mouth once daily.    irbesartan (AVAPRO) 150 MG tablet Take 2 tablets (300 mg total) by mouth every evening.    memantine (NAMENDA) 10 MG Tab Take 1 tablet (10 mg total) by mouth 2 (two) times daily.    pravastatin (PRAVACHOL) 40 MG tablet TAKE 1 TABLET BY MOUTH EVERY DAY    traZODone (DESYREL) 50 MG tablet Take 1 tablet (50 mg total) by mouth every evening.    multivitamin (ONE DAILY MULTIVITAMIN) per tablet Take 1 tablet by mouth once daily.    tretinoin (RETIN-A) 0.025 % cream Apply topically every evening.     Family History     Problem Relation (Age of Onset)    Alzheimer's disease Brother    Breast cancer Sister    Cancer Sister, Brother    Colon cancer Sister    Heart disease Sister    Hypertension Daughter    No Known Problems Sister    Obesity Mother, Brother    Stroke Mother        Tobacco Use    Smoking status: Never Smoker    Smokeless tobacco: Never Used   Substance and Sexual Activity    Alcohol use: No    Drug use: No    Sexual activity: Not Currently     Review of Systems   Constitutional: Negative.    HENT: Negative.    Eyes: Negative.    Respiratory: Negative.    Cardiovascular: Negative.    Gastrointestinal: Negative.    Genitourinary: Negative.    Musculoskeletal: Negative.    Allergic/Immunologic:  Negative.    Neurological: Positive for numbness (bilateral feet).   Hematological: Negative.    Psychiatric/Behavioral: Negative.      Objective:     Vital Signs (Most Recent):  Temp: 98.3 °F (36.8 °C) (09/12/19 1915)  Pulse: 60 (09/12/19 1915)  Resp: 20 (09/12/19 1915)  BP: (!) 174/80 (09/12/19 1915)  SpO2: 100 % (09/12/19 1915) Vital Signs (24h Range):  Temp:  [97.9 °F (36.6 °C)-98.6 °F (37 °C)] 98.3 °F (36.8 °C)  Pulse:  [57-73] 60  Resp:  [18-20] 20  SpO2:  [95 %-100 %] 100 %  BP: (155-180)/(77-86) 174/80     Weight: 73.2 kg (161 lb 6 oz)  Body mass index is 26.05 kg/m².    Physical Exam   Constitutional: She is oriented to person, place, and time. She appears well-developed and well-nourished.   HENT:   Head: Normocephalic and atraumatic.   Right Ear: External ear normal.   Left Ear: External ear normal.   Nose: Nose normal.   Mouth/Throat: Oropharynx is clear and moist. No oropharyngeal exudate.   Eyes: Pupils are equal, round, and reactive to light. Conjunctivae and EOM are normal.   Neck: Normal range of motion. Neck supple.   Cardiovascular: Normal rate, regular rhythm, normal heart sounds and intact distal pulses. Exam reveals no gallop and no friction rub.   No murmur heard.  Pulmonary/Chest: Effort normal and breath sounds normal.   Abdominal: Soft. Bowel sounds are normal.   Genitourinary:   Genitourinary Comments: deferred   Musculoskeletal: Normal range of motion.   Neurological: She is alert and oriented to person, place, and time.   Skin: Skin is warm and dry. Capillary refill takes less than 2 seconds.   Psychiatric: She has a normal mood and affect.   Nursing note and vitals reviewed.        CRANIAL NERVES     CN III, IV, VI   Pupils are equal, round, and reactive to light.  Extraocular motions are normal.        Significant Labs:   Recent Lab Results       09/12/19  1728   09/12/19  1505        Albumin   3.4     Alkaline Phosphatase   88     ALT   13     Anion Gap   8     Appearance, UA Clear        AST   15     Baso #   0.04     Basophil%   0.5     Bilirubin (UA) Negative       BILIRUBIN TOTAL   0.3  Comment:  For infants and newborns, interpretation of results should be based  on gestational age, weight and in agreement with clinical  observations.  Premature Infant recommended reference ranges:  Up to 24 hours.............<8.0 mg/dL  Up to 48 hours............<12.0 mg/dL  3-5 days..................<15.0 mg/dL  6-29 days.................<15.0 mg/dL       BUN, Bld   26     Calcium   8.7     Chloride   110     CO2   26     Color, UA Yellow       Creatinine   1.1     Differential Method   Automated     eGFR if    54     eGFR if non    47  Comment:  Calculation used to obtain the estimated glomerular filtration  rate (eGFR) is the CKD-EPI equation.        Eos #   0.2     Eosinophil%   2.1     Glucose   103     Glucose, UA Negative       Gran # (ANC)   4.4     Gran%   57.3     Hematocrit   38.7     Hemoglobin   12.6     Coumadin Monitoring INR   1.0  Comment:  Coumadin Therapy:  2.0 - 3.0 for INR for all indicators except mechanical heart valves  and antiphospholipid syndromes which should use 2.5 - 3.5.       Ketones, UA Negative       Leukocytes, UA 1+       Lymph #   2.3     Lymph%   30.1     MCH   30.3     MCHC   32.6     MCV   93     Microscopic Comment SEE COMMENT  Comment:  Other formed elements not mentioned in the report are not   present in the microscopic examination.          Mono #   0.8     Mono%   10.4     MPV   9.8     NITRITE UA Negative       Occult Blood UA Negative       pH, UA 6.0       Platelets   139     Potassium   3.9     PROTEIN TOTAL   6.3     Protein, UA Negative  Comment:  Recommend a 24 hour urine protein or a urine   protein/creatinine ratio if globulin induced proteinuria is  clinically suspected.         Protime   10.9     RBC   4.16     RBC, UA 2       RDW   14.0     Sodium   144     Specific Gravity, UA 1.010       Specimen UA Urine, Clean Catch        TSH   1.374     UROBILINOGEN UA Negative       WBC, UA 3       WBC   7.70           Significant Imaging:   Imaging Results          US Carotid Bilateral (Final result)  Result time 09/12/19 16:07:08    Final result by Kole Clark MD (09/12/19 16:07:08)                 Impression:      No evidence of a hemodynamically significant carotid bifurcation stenosis.      Electronically signed by: Kole Clark  Date:    09/12/2019  Time:    16:07             Narrative:    EXAMINATION:  US CAROTID BILATERAL    CLINICAL HISTORY:  Other symptoms and signs involving cognitive functions and awareness    TECHNIQUE:  Grayscale and color Doppler ultrasound examination of the carotid and vertebral artery systems bilaterally.  Stenosis estimates are per the NASCET measurement criteria.    COMPARISON:  None.    FINDINGS:  Right:    Internal Carotid Artery (ICA) peak systolic velocity 78.3 cm/sec    ICA/CCA peak systolic velocity ratio: 1.1    Plaque formation: Heterogeneous    Vertebral artery: Antegrade flow and normal waveform.    Left:    Internal Carotid Artery (ICA)  peak systolic velocity 86.7 cm/sec    ICA/CCA peak systolic velocity ratio: 1.3    Plaque formation: Heterogeneous    Vertebral artery: Antegrade flow and normal waveform.                               X-Ray Chest AP Portable (Final result)  Result time 09/12/19 14:36:36    Final result by Fredo Randhawa MD (09/12/19 14:36:36)                 Impression:      No acute cardiopulmonary disease.      Electronically signed by: Fredo Randhawa MD  Date:    09/12/2019  Time:    14:36             Narrative:    EXAMINATION:  XR CHEST AP PORTABLE    CLINICAL HISTORY:  Stroke;    TECHNIQUE:  Single frontal view of the chest was performed.    COMPARISON:  06/25/2018    FINDINGS:  Coarsening of bronchovascular markings/COPD.  No pleural effusion or pneumothorax.    The cardiac silhouette is upper limits of normal in size.  The hilar and mediastinal contours are  unremarkable.

## 2019-09-13 NOTE — PLAN OF CARE
Problem: Fall Injury Risk  Goal: Absence of Fall and Fall-Related Injury    Intervention: Promote Injury-Free Environment  Patient awake and alert free from falls and injury, normal sinus rhythm on monitor, denies pain at this time, frequent weight shift encouraged, neuro checks in progress, voids spontaneously,  POC reviewed with patient,  bed low locked, call light within reach, will continue to monitor

## 2019-09-13 NOTE — PLAN OF CARE
Problem: Adult Inpatient Plan of Care  Goal: Plan of Care Review  Outcome: Ongoing (interventions implemented as appropriate)  Plan of care reviewed with patient, she verbalized understanding.  Pt remains free from falls this shift, fall precautions in place.  Pt remains free from skin breakdown, she turns and repositions herself independently.  AAOx4, VSS, NAD noted at this time.  Pt remained afebrile.  Room air.  NSR to SB on the tele monitor.  Pt had an EEG and lumbar puncture today. Awaiting results  Pt currently resting comfortably in bed.  Hourly rounding complete.  Family remains at the bedside.  Will continue to monitor.

## 2019-09-13 NOTE — HPI
Mrs. Solano is a 80 yo WF who was sent by her Neurologist Dr. Guerrero for abnormal MRI. It appears she saw him a few days ago for bilateral feet numbness which started a few weeks ago. She has some underlying dementia. Currently her only complaint is numbness/tingling of her feet.

## 2019-09-13 NOTE — ASSESSMENT & PLAN NOTE
Per report concerning for acute CVA but clinically she has no focal deficits. Plan is to start ASA and a statin. Will only give BP medications for SBP>230 or DBP>110. Neurology has seen patient and will await further recommendations. PT/OT evaluation placed.

## 2019-09-13 NOTE — PROGRESS NOTES
Clinical Pharmacy Progress Note: Telemetry Pharmacist Rounding     Pharmacist counseled patient on the telemetry pharmacist's availability to discuss new medications, side effects, and reasons for changes in medication during admission.   Asked if patient had any medication questions at this time.   Instructed patient to use call light with any questions or concerns to discuss with pharmacy during the admission.  Offered bedside medication delivery to patient.   Patient expressed understanding and had no further questions.    Thank you for allowing us to participate in this patient's care.  Jenni Paul PharmD 09/13/2019 3:14 PM

## 2019-09-13 NOTE — CONSULTS
"Subjective:       Patient ID: Shannon Solano is a 79 y.o. female.    Chief Complaint: Abnormal MRI (daughter states, "The doctor told us to bring her here for evaluation of her brain MRI.")    HPI     ADMISSION HISTORY     The patient was accompanied by her daughter when I firs saw her. The daughter said that she is here for numbness in her feet. The patient started complaining of bilateral symmetric feet paroxysmal tingling and burning pain about 2 years ago.  The patient usually notices the pain after significant physical activity or prolonged sitting or standing.  The pain is paroxysmal and not persistent  The pain and tingling involves the whole feet, mainly the sole area and progresses to the very distal part of the legs.  The patient denies any hand symptoms. No ankle or foot weakness. The patient denies any low back with radiation to the legs.  The patient has good control on her bowel and bladder. No history of DM. No known history of thyroid disease. No history of malignancies.  No history of toxic environmental exposure.  No history of autoimmune disease. No history of excessive alcohol or recreational drug abuse. No history of chronic hepatitis or HIV. No history of malabsorption or nutritional deficiencies. In  B12, FA, TFT are NL. NCS/EMG in 2016 showed PN. She is on  mg TID which seems to be helpful. I ordered SPEP/IPEP, Compound cream. 10- Labs NL SPEP/IPEP. Now she saying that she never had pain! Takes  mg QHS for numbness!! I stopped GBP because it was meant for pain.     Reviewed the records and there was some concern about her memory. The patient describes some memory lapses especially with "to-do-list" but otherwise fully independent and functions at high level. In  B12, FA, TFT are NL. RPR is -ve. CTH in 2016 is unremarkable and CTH was ordered and pending. No behavioral problems. Recommended monitoring and consider CNP Testing.The daughter remained worried " about the patient's memory. The patient remains independent and highly functioning but has episodes of memory lapses and confusion that concerned the daughter like going to the mailbox and not knowing why she was there, forgetting that she went shopping.The patient was also having severe insomnia. I ordered Brain MRI and started her on Namenda and Trazodone. I reviewed the Brain MRI which showed B/L frontal cortical DWI+ve signal which can be see postictally but could be ischemic or infectious in nature. The patient and daughter deny seizure activity. I called the patient's daughter to bring her to the hospital for further evaluation and management. I ordered Stroke GORMAN, EEG, LP with Encephalitis and Protein 14-3-3 panel and started her on Thiamine 100 mg IV daily.     INTERVAL HISTORY    No new events. A/S EEG is NL. Carotid U/S is unremarkable. CSF results are pending        Review of Systems   Constitutional: Negative for appetite change and fatigue.   HENT: Negative for hearing loss and tinnitus.    Eyes: Negative for photophobia and visual disturbance.   Respiratory: Negative for apnea and shortness of breath.    Cardiovascular: Negative for chest pain and palpitations.   Gastrointestinal: Negative for nausea and vomiting.   Endocrine: Negative for cold intolerance and heat intolerance.   Genitourinary: Negative for difficulty urinating and urgency.   Musculoskeletal: Positive for joint swelling. Negative for arthralgias, back pain, gait problem, myalgias, neck pain and neck stiffness.   Skin: Negative for color change and rash.   Allergic/Immunologic: Negative for environmental allergies and immunocompromised state.   Neurological: Positive for numbness. Negative for dizziness, tremors, seizures, syncope, facial asymmetry, speech difficulty, weakness, light-headedness and headaches.   Hematological: Negative for adenopathy. Does not bruise/bleed easily.   Psychiatric/Behavioral: Negative for agitation,  behavioral problems, confusion, decreased concentration, dysphoric mood, hallucinations, self-injury, sleep disturbance and suicidal ideas. The patient is not hyperactive.          Current Facility-Administered Medications:     aspirin EC tablet 81 mg, 81 mg, Oral, Daily, Giselle Miller MD, 81 mg at 09/13/19 1110    atorvastatin tablet 40 mg, 40 mg, Oral, Daily, Giselle Miller MD, 40 mg at 09/13/19 1110    enoxaparin injection 40 mg, 40 mg, Subcutaneous, Daily, Giselle Miller MD, 40 mg at 09/12/19 2130    labetalol injection 10 mg, 10 mg, Intravenous, Q6H PRN, Giselle Miller MD    sodium chloride 0.9% flush 10 mL, 10 mL, Intravenous, PRN, Giselle Miller MD    thiamine (B-1) 100 mg in dextrose 5 % 50 mL IVPB, 100 mg, Intravenous, Daily, Blaise Guerrero MD, Last Rate: 12.5 mL/hr at 09/13/19 1110, 100 mg at 09/13/19 1110  Past Medical History:   Diagnosis Date    Anemia     Arthritis     Cataract     Disorder of kidney and ureter     Hyperlipidemia     Hypertension     Insomnia 8/28/2019    Osteoarthritis     Polyneuropathy     Stroke      Past Surgical History:   Procedure Laterality Date    APPENDECTOMY      CATARACT EXTRACTION Bilateral 2014    EYE SURGERY Bilateral     HYSTERECTOMY      VARICOSE VEIN SURGERY       Social History     Socioeconomic History    Marital status:      Spouse name: Not on file    Number of children: 2    Years of education: Not on file    Highest education level: Not on file   Occupational History    Not on file   Social Needs    Financial resource strain: Not on file    Food insecurity:     Worry: Not on file     Inability: Not on file    Transportation needs:     Medical: Not on file     Non-medical: Not on file   Tobacco Use    Smoking status: Never Smoker    Smokeless tobacco: Never Used   Substance and Sexual Activity    Alcohol use: No    Drug use: No    Sexual activity: Not Currently   Lifestyle    Physical activity:     Days per week:  Not on file     Minutes per session: Not on file    Stress: Not on file   Relationships    Social connections:     Talks on phone: Not on file     Gets together: Not on file     Attends Spiritism service: Not on file     Active member of club or organization: Not on file     Attends meetings of clubs or organizations: Not on file     Relationship status: Not on file   Other Topics Concern    Are you pregnant or think you may be? No    Breast-feeding No   Social History Narrative    Not on file       Objective:     Vital signs reviewed     GENERAL APPEARANCE:     The patient looks comfortable with evidence of insomnia.    No signs of medical or psychiatric distress.    Normal breathing pattern.    No dysmorphic features    Normal eye contact.     GENERAL MEDICAL EXAM:    HEENT:  Head is atraumatic normocephalic. No tender temporal arteries.     Neck and Axillae: No JVD. No carotid bruits. No thyromegaly. No lymphadenopathy.    Cardiopulmonary: No cyanosis. No tachypnea. Normal respiratory effort.  Clear breath sounds. Normal heart sounds with regular rhythm and no murmurs.    Gastrointestinal:  No stomas or lesions. No hernias.  Abdomen is soft non-tender. No masses or organomegaly.    Skin, Hair and Nails: No pathognonomic skin rash. No neurofibromatosis.   No stigmata of autoimmune disease.     Limbs: No varicose veins. B/L ankle edema. Symmetric pulses.     Muskoskeletal: No deformities.No spine tenderness.   No signs of longstanding neuropathy. No dislocations or fractures.        Neurologic Exam     Mental Status   Oriented to person, place, and time.   Registration: recalls 3 of 3 objects. Recall at 5 minutes: recalls 3 of 3 objects. Follows 3 step commands.   Attention: normal. Concentration: normal.   Speech: speech is normal   Level of consciousness: alert  Knowledge: good and consistent with education. Able to perform simple calculations.   Able to name object. Able to read. Able to repeat. Able to  write. Normal comprehension.     MOCA 30    Visuospatial/Executive  5   Naming                          3  Attention                        6  Language                      3  Abstraction                    2  Recall                            5  Orientation                     6       Cranial Nerves     CN II   Visual fields full to confrontation.   Visual acuity: normal  Right visual field deficit: none  Left visual field deficit: none     CN III, IV, VI   Pupils are equal, round, and reactive to light.  Extraocular motions are normal.   Right pupil: Size: 2 mm. Shape: regular. Reactivity: brisk. Consensual response: intact. Accommodation: intact.   Left pupil: Size: 2 mm. Shape: regular. Reactivity: brisk. Consensual response: intact. Accommodation: intact.   CN III: no CN III palsy  CN VI: no CN VI palsy  Nystagmus: none   Diplopia: none  Ophthalmoparesis: none  Upgaze: normal  Downgaze: normal  Conjugate gaze: present  Vestibulo-ocular reflex: present    CN V   Facial sensation intact.   Right facial sensation deficit: none  Left facial sensation deficit: none  Right corneal reflex: normal  Left corneal reflex: normal    CN VII   Right facial weakness: none  Left facial weakness: none  Right taste: normal  Left taste: normal    CN VIII   CN VIII normal.   Hearing: intact  Right Rinne: AC > BC  Left Rinne: AC > BC  Guzmán: does not lateralize     CN IX, X   CN IX normal.   CN X normal.   Palate: symmetric  Right gag reflex: normal  Left gag reflex: normal    CN XI   CN XI normal.   Right sternocleidomastoid strength: normal  Left sternocleidomastoid strength: normal  Right trapezius strength: normal  Left trapezius strength: normal    CN XII   CN XII normal.   Tongue: not atrophic  Fasciculations: absent  Tongue deviation: none    Motor Exam   Muscle bulk: normal  Overall muscle tone: normal  Right arm tone: normal  Left arm tone: normal  Right arm pronator drift: absent  Left arm pronator drift: absent  Right  leg tone: normal  Left leg tone: normal    Strength   Strength 5/5 throughout.   Right neck flexion:   Left neck flexion:   Right neck extension:   Left neck extension:   Right deltoid:   Left deltoid:   Right biceps:   Left biceps:   Right triceps:   Left triceps:   Right wrist flexion:   Left wrist flexion:   Right wrist extension:   Left wrist extension:   Right interossei:   Left interossei:   Right abdominals:   Left abdominals:   Right iliopsoas:   Left iliopsoas:   Right quadriceps:   Left quadriceps:   Right hamstrin/5  Left hamstrin/5  Right glutei:   Left glutei:   Right anterior tibial:   Left anterior tibial:   Right posterior tibial:   Left posterior tibial:   Right peroneal:   Left peroneal:   Right gastroc:   Left gastroc:     Sensory Exam   Right arm light touch: normal  Left arm light touch: normal  Right leg light touch: decreased from ankle  Left leg light touch: decreased from ankle  Right arm vibration: normal  Left arm vibration: normal  Right leg vibration: decreased from toes  Left leg vibration: decreased from toes  Right arm proprioception: normal  Left arm proprioception: normal  Right leg proprioception: decreased from toes  Left leg proprioception: decreased from toes  Right arm pinprick: normal  Left arm pinprick: normal  Right leg pinprick: decreased from ankle  Left leg pinprick: decreased from ankle  Graphesthesia: normal  Stereognosis: normal    Gait, Coordination, and Reflexes     Gait  Gait: (Slow)    Coordination   Finger to nose coordination: normal  Heel to shin coordination: normal    Tremor   Resting tremor: absent  Intention tremor: absent  Action tremor: absent    Reflexes   Right brachioradialis: 2+  Left brachioradialis: 2+  Right biceps: 2+  Left biceps: 2+  Right triceps: 2+  Left triceps: 2+  Right patellar: 1+  Left patellar: 1+  Right achilles: 0  Left achilles:  0  Right plantar: normal  Left plantar: normal  Right Ochoa: absent  Left Ochoa: absent  Right ankle clonus: absent  Left ankle clonus: absent  Right pendular knee jerk: absent  Left pendular knee jerk: absent      Lab Results   Component Value Date    WBC 7.30 09/13/2019    HGB 13.2 09/13/2019    HCT 40.6 09/13/2019    MCV 93 09/13/2019     (L) 09/13/2019     Sodium   Date Value Ref Range Status   09/13/2019 142 136 - 145 mmol/L Final     Potassium   Date Value Ref Range Status   09/13/2019 4.1 3.5 - 5.1 mmol/L Final     Chloride   Date Value Ref Range Status   09/13/2019 107 95 - 110 mmol/L Final     CO2   Date Value Ref Range Status   09/13/2019 27 23 - 29 mmol/L Final     Glucose   Date Value Ref Range Status   09/13/2019 87 70 - 110 mg/dL Final     BUN, Bld   Date Value Ref Range Status   09/13/2019 21 8 - 23 mg/dL Final     Creatinine   Date Value Ref Range Status   09/13/2019 0.9 0.5 - 1.4 mg/dL Final     Calcium   Date Value Ref Range Status   09/13/2019 8.8 8.7 - 10.5 mg/dL Final     Total Protein   Date Value Ref Range Status   09/13/2019 6.4 6.0 - 8.4 g/dL Final     Albumin   Date Value Ref Range Status   09/13/2019 3.4 (L) 3.5 - 5.2 g/dL Final     Total Bilirubin   Date Value Ref Range Status   09/13/2019 0.4 0.1 - 1.0 mg/dL Final     Comment:     For infants and newborns, interpretation of results should be based  on gestational age, weight and in agreement with clinical  observations.  Premature Infant recommended reference ranges:  Up to 24 hours.............<8.0 mg/dL  Up to 48 hours............<12.0 mg/dL  3-5 days..................<15.0 mg/dL  6-29 days.................<15.0 mg/dL       Alkaline Phosphatase   Date Value Ref Range Status   09/13/2019 88 55 - 135 U/L Final     AST   Date Value Ref Range Status   09/13/2019 16 10 - 40 U/L Final     ALT   Date Value Ref Range Status   09/13/2019 13 10 - 44 U/L Final     Anion Gap   Date Value Ref Range Status   09/13/2019 8 8 - 16 mmol/L  Final     eGFR if    Date Value Ref Range Status   09/13/2019 >60 >60 mL/min/1.73 m^2 Final     eGFR if non    Date Value Ref Range Status   09/13/2019 >60 >60 mL/min/1.73 m^2 Final     Comment:     Calculation used to obtain the estimated glomerular filtration  rate (eGFR) is the CKD-EPI equation.        Lab Results   Component Value Date    IQTAHFFO55 381 06/25/2018     Lab Results   Component Value Date    TSH 1.374 09/12/2019     Brain MRI    Abnormal signal in the cortex of the pre motor strip bilaterally (DWI+ve)            Carotid U/S NL    EEG A/S NL      Reviewed the neuroimaging independently       Assessment:       1. Abnormal MRI    2. Insomnia    3. Bilateral shoulder pain    4. Bilateral carotid artery disease    5. AC (acromioclavicular) arthritis    6. Hypertension    7. Cognitive decline    8. Dyslipidemia    9. Hypertension, unspecified type    10. History of stroke 1/16/13 Rt basal ganglia    11. Keratosis, seborrheic    12. Acromioclavicular joint arthritis, unspecified laterality    13. Venous insufficiency    14. CKD (chronic kidney disease) stage 3, GFR 30-59 ml/min    15. Pre-diabetes    16. Bilateral carotid artery disease, unspecified type    17. Bilateral shoulder pain, unspecified chronicity    18. Left arm weakness    19. Neuropathy of both feet    20. CSPN (cryptogenic sensory polyneuropathy)    21. Mild neurocognitive disorder    22. Insomnia, unspecified type        Plan:           ABNORMAL CORTICAL SIGNAL ON BRAIN MRI    Continue ASA    Continue Thiamine    Will F/U on CSF results     Will proceed with AEEG as an outpatient in addition to CNP                              Blaise Guerrero MD, FAAN    Attending Neurologist/Epileptologist         Diplomate, American Board-Psychiatry and Neurology (Neurology)    Diplomate, American Board-Clinical Neurophysiology (Epilpesy-Neuromuscular)     Fellow, American Academy of Neurology

## 2019-09-13 NOTE — OP NOTE
Pre Op Diagnosis: abnormal brain MRI     Post Op Diagnosis: same     Procedure:  LP     Procedure performed by: Carina MCNEIL, Marc VARGAS     Written Informed Consent Obtained: Yes     Specimen Removed:  yes     Estimated Blood Loss:  minimal     Findings: Local anesthesia and moderate sedation were used.     The patient tolerated the procedure well and there were no complications.      Sterile technique was performed in the posterior L3-4, lidocaine was used as a local anesthetic. OP of 17cm h2o. 11cc's of clear CSF obtained.  Pt tolerated the procedure well without immediate complications.  Please see radiologist report for details. F/u with PCP and/or ordering physician.

## 2019-09-13 NOTE — PLAN OF CARE
Problem: Adult Inpatient Plan of Care  Goal: Plan of Care Review  Outcome: Ongoing (interventions implemented as appropriate)  Recommendations     Recommendation/Intervention: 1. Continue current diet 2. Add Boost TID 3. RD to f/u  Intervention: Nutrition supplement therapy  Goals: Meet >85% EEN/EPN while admitted  Nutrition Goal Status: new  Communication of RD Recs: POC, sticky note

## 2019-09-13 NOTE — NURSING
Patient is scheduled to have an EEG this morning notified Annabella states she will be out sometime this morning charge nurse notified.

## 2019-09-16 ENCOUNTER — OFFICE VISIT (OUTPATIENT)
Dept: NEUROLOGY | Facility: CLINIC | Age: 79
End: 2019-09-16
Payer: MEDICARE

## 2019-09-16 ENCOUNTER — PATIENT OUTREACH (OUTPATIENT)
Dept: ADMINISTRATIVE | Facility: CLINIC | Age: 79
End: 2019-09-16

## 2019-09-16 VITALS
WEIGHT: 158.94 LBS | HEART RATE: 82 BPM | BODY MASS INDEX: 25.54 KG/M2 | SYSTOLIC BLOOD PRESSURE: 122 MMHG | RESPIRATION RATE: 18 BRPM | HEIGHT: 66 IN | DIASTOLIC BLOOD PRESSURE: 78 MMHG

## 2019-09-16 DIAGNOSIS — N18.30 CKD (CHRONIC KIDNEY DISEASE) STAGE 3, GFR 30-59 ML/MIN: Chronic | ICD-10-CM

## 2019-09-16 DIAGNOSIS — G57.93 NEUROPATHY OF BOTH FEET: ICD-10-CM

## 2019-09-16 DIAGNOSIS — G31.84 MILD NEUROCOGNITIVE DISORDER: ICD-10-CM

## 2019-09-16 DIAGNOSIS — M25.512 BILATERAL SHOULDER PAIN, UNSPECIFIED CHRONICITY: ICD-10-CM

## 2019-09-16 DIAGNOSIS — E78.5 DYSLIPIDEMIA: Chronic | ICD-10-CM

## 2019-09-16 DIAGNOSIS — Z86.73 HISTORY OF STROKE: Chronic | ICD-10-CM

## 2019-09-16 DIAGNOSIS — I10 HYPERTENSION, UNSPECIFIED TYPE: Chronic | ICD-10-CM

## 2019-09-16 DIAGNOSIS — G60.8 CSPN (CRYPTOGENIC SENSORY POLYNEUROPATHY): ICD-10-CM

## 2019-09-16 DIAGNOSIS — G47.00 INSOMNIA, UNSPECIFIED TYPE: ICD-10-CM

## 2019-09-16 DIAGNOSIS — I77.9 BILATERAL CAROTID ARTERY DISEASE, UNSPECIFIED TYPE: Chronic | ICD-10-CM

## 2019-09-16 DIAGNOSIS — R93.89 ABNORMAL MRI: Primary | ICD-10-CM

## 2019-09-16 DIAGNOSIS — M25.511 BILATERAL SHOULDER PAIN, UNSPECIFIED CHRONICITY: ICD-10-CM

## 2019-09-16 DIAGNOSIS — L82.1 KERATOSIS, SEBORRHEIC: ICD-10-CM

## 2019-09-16 DIAGNOSIS — R73.03 PRE-DIABETES: Chronic | ICD-10-CM

## 2019-09-16 DIAGNOSIS — I87.2 VENOUS INSUFFICIENCY: Chronic | ICD-10-CM

## 2019-09-16 DIAGNOSIS — M19.019 ACROMIOCLAVICULAR JOINT ARTHRITIS, UNSPECIFIED LATERALITY: ICD-10-CM

## 2019-09-16 LAB
HSV1, PCR, CSF: NEGATIVE
HSV2, PCR, CSF: NEGATIVE
PATH INTERP FLD-IMP: NORMAL

## 2019-09-16 PROCEDURE — 99215 OFFICE O/P EST HI 40 MIN: CPT | Mod: HCNC,S$GLB,, | Performed by: PSYCHIATRY & NEUROLOGY

## 2019-09-16 PROCEDURE — 99499 UNLISTED E&M SERVICE: CPT | Mod: HCNC,S$GLB,, | Performed by: PSYCHIATRY & NEUROLOGY

## 2019-09-16 PROCEDURE — 1101F PT FALLS ASSESS-DOCD LE1/YR: CPT | Mod: HCNC,CPTII,S$GLB, | Performed by: PSYCHIATRY & NEUROLOGY

## 2019-09-16 PROCEDURE — 3078F PR MOST RECENT DIASTOLIC BLOOD PRESSURE < 80 MM HG: ICD-10-PCS | Mod: HCNC,CPTII,S$GLB, | Performed by: PSYCHIATRY & NEUROLOGY

## 2019-09-16 PROCEDURE — 99999 PR PBB SHADOW E&M-EST. PATIENT-LVL III: CPT | Mod: PBBFAC,HCNC,, | Performed by: PSYCHIATRY & NEUROLOGY

## 2019-09-16 PROCEDURE — 99999 PR PBB SHADOW E&M-EST. PATIENT-LVL III: ICD-10-PCS | Mod: PBBFAC,HCNC,, | Performed by: PSYCHIATRY & NEUROLOGY

## 2019-09-16 PROCEDURE — 99215 PR OFFICE/OUTPT VISIT, EST, LEVL V, 40-54 MIN: ICD-10-PCS | Mod: HCNC,S$GLB,, | Performed by: PSYCHIATRY & NEUROLOGY

## 2019-09-16 PROCEDURE — 3074F PR MOST RECENT SYSTOLIC BLOOD PRESSURE < 130 MM HG: ICD-10-PCS | Mod: HCNC,CPTII,S$GLB, | Performed by: PSYCHIATRY & NEUROLOGY

## 2019-09-16 PROCEDURE — 3074F SYST BP LT 130 MM HG: CPT | Mod: HCNC,CPTII,S$GLB, | Performed by: PSYCHIATRY & NEUROLOGY

## 2019-09-16 PROCEDURE — 99499 RISK ADDL DX/OHS AUDIT: ICD-10-PCS | Mod: HCNC,S$GLB,, | Performed by: PSYCHIATRY & NEUROLOGY

## 2019-09-16 PROCEDURE — 1101F PR PT FALLS ASSESS DOC 0-1 FALLS W/OUT INJ PAST YR: ICD-10-PCS | Mod: HCNC,CPTII,S$GLB, | Performed by: PSYCHIATRY & NEUROLOGY

## 2019-09-16 PROCEDURE — 3078F DIAST BP <80 MM HG: CPT | Mod: HCNC,CPTII,S$GLB, | Performed by: PSYCHIATRY & NEUROLOGY

## 2019-09-16 NOTE — PATIENT INSTRUCTIONS
Fall Due to Dizziness, Weakness, or Loss of Balance  The symptoms that led to your fall have been evaluated. Your doctor feels it is safe for you to return home.  Many things can cause you to become dizzy. Everyone means a little something different by the word dizzy. People may describe their symptoms using these words:  · It doesnt feel right in my head  · It feels like spinning in my head  · It seems like the room is spinning  · My balance feels off  · I feel lightheaded, like I am going to pass out  All these descriptions can have real causes.     Your balance mechanism is in your inner ear. Anything disturbing it can make you feel dizzy, whether it is from a cold, an injury, or many other things. Anything that causes your blood pressure to drop suddenly can make you feel lightheaded, or like you are going to faint. This is because at that moment there might not be enough blood flowing to your brain. Causes include:  · Medicines  · Dehydration  · Standing up or bending over too quickly  · Becoming overheated  · Taking a hot shower or bath  · Straining hard while lifting something or using the toilet  · Strokes, heart attack, heart valve disease, very slow or very fast heart rate  · Low blood sugar  · Ear infection  · Hyperventilation  · Anemia  · Trauma  · Infection  · Panic attack  · Pregnancy  You may be at risk of repeat falls. Take precautions described below to prevent another fall.  Home care  · If you become lightheaded or dizzy, lie down immediately or sit and lean forward with your head down. It is better to do this than fall and seriously hurt or injure yourself.  · Rest today. When changing position, take a moment to be sure any dizziness goes away before standing and walking.  · If you have been prescribed a walker, be sure to use it whenever you walk, even if it is a short distance.  · If you were injured during the fall, follow the advice from your doctor regarding care of your injury.  · Be  sure your doctor knows all the medicines, herbs, and supplements you take. Some medicines can cause dizziness.  Follow-up care  Unless given other advice, call your doctor on the next office day to advise of your fall and to schedule an appointment. You may require further treatment for the underlying condition that caused todays fall.  If X-ray or a CT scan were done, you will be notified of the results, especially if it affects treatment.  Call 911  Call 911 if any of these occur:  · Trouble breathing  · Confused or difficulty arousing  · Fainting or loss of consciousness  · Rapid or very slow heart rate  · Seizure  · Difficulty with speech or vision, weakness of an arm or leg  · Difficulty walking or talking, loss of balance  · Numbness or weakness in one side of your body, facial droop  When to seek medical advice  Call your healthcare provider right away if any of the following occur:  · Another fall  · Continued dizzy spells  · Severe headache  · Blood in vomit or stools (black or red color)  Date Last Reviewed: 11/5/2015  © 0900-4473 [employer], [employer]. [address] [place] [address], [place] [address]. All rights reserved. This information is not intended as a substitute for professional medical care. Always follow your healthcare professional's instructions.

## 2019-09-16 NOTE — PROGRESS NOTES
"Subjective:       Patient ID: Shannon Solano is a 79 y.o. female.    Chief Complaint: Hospital Follow Up( Abnormal MRI )    HPI       BACKGROUND HISTORY      The patient is accompanied by her daughter who said that she is here for numbness in her feet. The patient started complaining of bilateral symmetric feet paroxysmal tingling and burning pain about 2 years ago.  The patient usually notices the pain after significant physical activity or prolonged sitting or standing.  The pain is paroxysmal and not persistent  The pain and tingling involves the whole feet, mainly the sole area and progresses to the very distal part of the legs.  The patient denies any hand symptoms. No ankle or foot weakness. The patient denies any low back with radiation to the legs.  The patient has good control on her bowel and bladder. No history of DM. No known history of thyroid disease. No history of malignancies.  No history of toxic environmental exposure.  No history of autoimmune disease. No history of excessive alcohol or recreational drug abuse. No history of chronic hepatitis or HIV. No history of malabsorption or nutritional deficiencies. In  B12, FA, TFT are NL. NCS/EMG in 2016 showed PN. She is on  mg TID which seems to be helpful. I ordered SPEP/IPEP, Compound cream. 10- Labs NL SPEP/IPEP. Now she saying that she never had pain! Takes  mg QHS for numbness!! I stopped GBP.    Reviewed the records and there was some concern about her memory. The patient describes some memory lapses especially with "to-do-list" but otherwise fully independent and functions at high level. In  B12, FA, TFT are NL. RPR is -ve. CTH in 2016 is unremarkable and CTH was ordered and pending. No behavioral problems. Recommended monitoring and consider CNP Testing.The daughter is worried about the patient's memory. The patient remains independent and highly functioning but has episodes of memory lapses and confusion that " concerned the daughter like going to the mailbox and not knowing why she was there, forgetting that she went shopping.The patient is also having severe insomnia. I ordered Brain MRI, CNP and started her on Namenda and Trazodone.          INTERVAL HISTORY     Off GBP. Has BLE numbness with no pain.    09- Brain MRI shows B/L cortical (FL) DWI changes. I admitted the patient to the hospital for thorough evaluation. I started her on Thiamine and ASA. Obtained Carotid U/S which was unremarkable. EKG and Tele were NSR. I also obtained EEG and CSF analysis where WNL as well. No new clinical changes. Trazodone 50 mg QHS helped her with sleep. Tolerating Namenda 10 mg BID very well.        Review of Systems   Constitutional: Negative for appetite change and fatigue.   HENT: Negative for hearing loss and tinnitus.    Eyes: Negative for photophobia and visual disturbance.   Respiratory: Negative for apnea and shortness of breath.    Cardiovascular: Negative for chest pain and palpitations.   Gastrointestinal: Negative for nausea and vomiting.   Endocrine: Negative for cold intolerance and heat intolerance.   Genitourinary: Negative for difficulty urinating and urgency.   Musculoskeletal: Negative for arthralgias, back pain, gait problem, joint swelling, myalgias, neck pain and neck stiffness.   Skin: Negative for color change and rash.   Allergic/Immunologic: Negative for environmental allergies and immunocompromised state.   Neurological: Positive for numbness. Negative for dizziness, tremors, seizures, syncope, facial asymmetry, speech difficulty, weakness, light-headedness and headaches.   Hematological: Negative for adenopathy. Does not bruise/bleed easily.   Psychiatric/Behavioral: Negative for agitation, behavioral problems, confusion, decreased concentration, dysphoric mood, hallucinations, self-injury, sleep disturbance and suicidal ideas. The patient is not hyperactive.          Current Outpatient Medications:      aspirin (ECOTRIN) 81 MG EC tablet, Take 81 mg by mouth once daily., Disp: , Rfl:     atorvastatin (LIPITOR) 40 MG tablet, Take 1 tablet (40 mg total) by mouth once daily., Disp: 90 tablet, Rfl: 3    folic acid-vit B6-vit B12 2.5-25-2 mg (FOLBIC OR EQUIV) 2.5-25-2 mg Tab, Take 1 tablet by mouth once daily., Disp: 60 tablet, Rfl: 1    irbesartan (AVAPRO) 150 MG tablet, Take 2 tablets (300 mg total) by mouth every evening., Disp: 180 tablet, Rfl: 0    memantine (NAMENDA) 10 MG Tab, Take 1 tablet (10 mg total) by mouth 2 (two) times daily., Disp: 60 tablet, Rfl: 5    multivitamin (ONE DAILY MULTIVITAMIN) per tablet, Take 1 tablet by mouth once daily., Disp: , Rfl:     thiamine (VITAMIN B-1) 100 MG tablet, Take 1 tablet (100 mg total) by mouth once daily., Disp: 60 tablet, Rfl: 1    traZODone (DESYREL) 50 MG tablet, Take 1 tablet (50 mg total) by mouth every evening., Disp: 30 tablet, Rfl: 5    tretinoin (RETIN-A) 0.025 % cream, Apply topically every evening., Disp: 45 g, Rfl: 5  Past Medical History:   Diagnosis Date    Anemia     Arthritis     Cataract     Disorder of kidney and ureter     Hyperlipidemia     Hypertension     Insomnia 8/28/2019    Osteoarthritis     Polyneuropathy     Stroke      Past Surgical History:   Procedure Laterality Date    APPENDECTOMY      CATARACT EXTRACTION Bilateral 2014    EYE SURGERY Bilateral     HYSTERECTOMY      VARICOSE VEIN SURGERY       Social History     Socioeconomic History    Marital status:      Spouse name: Not on file    Number of children: 2    Years of education: Not on file    Highest education level: Not on file   Occupational History    Not on file   Social Needs    Financial resource strain: Not on file    Food insecurity:     Worry: Not on file     Inability: Not on file    Transportation needs:     Medical: Not on file     Non-medical: Not on file   Tobacco Use    Smoking status: Never Smoker    Smokeless tobacco: Never  Used   Substance and Sexual Activity    Alcohol use: No    Drug use: No    Sexual activity: Not Currently   Lifestyle    Physical activity:     Days per week: Not on file     Minutes per session: Not on file    Stress: Not on file   Relationships    Social connections:     Talks on phone: Not on file     Gets together: Not on file     Attends [employer] service: Not on file     Active member of club or organization: Not on file     Attends meetings of clubs or organizations: Not on file     Relationship status: Not on file   Other Topics Concern    Are you pregnant or think you may be? No    Breast-feeding No   Social History Narrative    Not on file       Objective:     GENERAL APPEARANCE:     The patient looks comfortable.    No signs of medical or psychiatric distress.    Normal breathing pattern.    No dysmorphic features    Normal eye contact.     GENERAL MEDICAL EXAM:    HEENT:  Head is atraumatic normocephalic.     Neck and Axillae: No JVD.    Cardiopulmonary: No cyanosis. No tachypnea. Normal respiratory effort.    Gastrointestinal:  No stomas or lesions. No hernias.    Skin, Hair and Nails: No pathognonomic skin rash. No neurofibromatosis. No stigmata of autoimmune disease.     Limbs: Varicose veins. No edema.     Muskoskeletal: No deformities.No signs of longstanding neuropathy. No dislocations or fractures.        Neurologic Exam     Mental Status   Oriented to person, place, and time.   Registration: recalls 3 of 3 objects. Recall at 5 minutes: recalls 3 of 3 objects. Follows 3 step commands.   Attention: normal. Concentration: normal.   Speech: speech is normal   Level of consciousness: alert  Knowledge: good and consistent with education. Able to perform simple calculations.   Able to name object. Able to read. Able to repeat. Able to write. Normal comprehension.     MOCA 30>30>30    Visuospatial/Executive  5   Naming                          3  Attention                        6  Language                       3  Abstraction                    2  Recall                            5  Orientation                     6       Cranial Nerves     CN II   Visual fields full to confrontation.   Visual acuity: normal  Right visual field deficit: none  Left visual field deficit: none     CN III, IV, VI   Pupils are equal, round, and reactive to light.  Extraocular motions are normal.   Right pupil: Size: 2 mm. Shape: regular. Reactivity: brisk. Consensual response: intact. Accommodation: intact.   Left pupil: Size: 2 mm. Shape: regular. Reactivity: brisk. Consensual response: intact. Accommodation: intact.   CN III: no CN III palsy  CN VI: no CN VI palsy  Nystagmus: none   Diplopia: none  Ophthalmoparesis: none  Upgaze: normal  Downgaze: normal  Conjugate gaze: present  Vestibulo-ocular reflex: present    CN V   Facial sensation intact.   Right facial sensation deficit: none  Left facial sensation deficit: none  Right corneal reflex: normal  Left corneal reflex: normal    CN VII   Right facial weakness: none  Left facial weakness: none  Right taste: normal  Left taste: normal    CN VIII   CN VIII normal.   Hearing: intact  Right Rinne: AC > BC  Left Rinne: AC > BC  Guzmán: does not lateralize     CN IX, X   CN IX normal.   CN X normal.   Palate: symmetric  Right gag reflex: normal  Left gag reflex: normal    CN XI   CN XI normal.   Right sternocleidomastoid strength: normal  Left sternocleidomastoid strength: normal  Right trapezius strength: normal  Left trapezius strength: normal    CN XII   CN XII normal.   Tongue: not atrophic  Fasciculations: absent  Tongue deviation: none    Motor Exam   Muscle bulk: normal  Overall muscle tone: normal  Right arm tone: normal  Left arm tone: normal  Right arm pronator drift: absent  Left arm pronator drift: absent  Right leg tone: normal  Left leg tone: normal    Strength   Strength 5/5 throughout.   Right neck flexion: 5/5  Left neck flexion: 5/5  Right neck extension:    Left neck extension:   Right deltoid:   Left deltoid:   Right biceps:   Left biceps:   Right triceps:   Left triceps:   Right wrist flexion:   Left wrist flexion:   Right wrist extension:   Left wrist extension:   Right interossei:   Left interossei:   Right abdominals:   Left abdominals:   Right iliopsoas:   Left iliopsoas:   Right quadriceps:   Left quadriceps:   Right hamstrin/5  Left hamstrin/5  Right glutei:   Left glutei:   Right anterior tibial:   Left anterior tibial:   Right posterior tibial:   Left posterior tibial:   Right peroneal:   Left peroneal:   Right gastroc:   Left gastroc:     Sensory Exam   Right arm light touch: decreased from fingers  Left arm light touch: decreased from fingers  Right leg light touch: decreased from toes  Left leg light touch: decreased from toes  Right arm vibration: normal  Left arm vibration: normal  Right leg vibration: decreased from toes  Left leg vibration: decreased from toes  Right arm proprioception: normal  Left arm proprioception: normal  Right leg proprioception: decreased from toes  Left leg proprioception: decreased from toes  Right arm pinprick: decreased from fingers  Left arm pinprick: decreased from fingers  Right leg pinprick: decreased from toes  Left leg pinprick: decreased from toes  Graphesthesia: normal  Stereognosis: normal    Gait, Coordination, and Reflexes     Gait  Gait: wide-based    Coordination   Romberg: positive  Finger to nose coordination: normal  Heel to shin coordination: normal    Tremor   Resting tremor: absent  Intention tremor: absent  Action tremor: absent    Reflexes   Right brachioradialis: 2+  Left brachioradialis: 2+  Right biceps: 2+  Left biceps: 2+  Right triceps: 2+  Left triceps: 2+  Right patellar: 1+  Left patellar: 1+  Right achilles: 0  Left achilles: 0  Right plantar: normal  Left plantar: normal  Right Ochoa:  absent  Left Ochoa: absent  Right ankle clonus: absent  Left ankle clonus: absent  Right pendular knee jerk: absent  Left pendular knee jerk: absent      Lab Results   Component Value Date    WBC 7.30 09/13/2019    HGB 13.2 09/13/2019    HCT 40.6 09/13/2019    MCV 93 09/13/2019     (L) 09/13/2019     Sodium   Date Value Ref Range Status   09/13/2019 142 136 - 145 mmol/L Final     Potassium   Date Value Ref Range Status   09/13/2019 4.1 3.5 - 5.1 mmol/L Final     Chloride   Date Value Ref Range Status   09/13/2019 107 95 - 110 mmol/L Final     CO2   Date Value Ref Range Status   09/13/2019 27 23 - 29 mmol/L Final     Glucose   Date Value Ref Range Status   09/13/2019 87 70 - 110 mg/dL Final     BUN, Bld   Date Value Ref Range Status   09/13/2019 21 8 - 23 mg/dL Final     Creatinine   Date Value Ref Range Status   09/13/2019 0.9 0.5 - 1.4 mg/dL Final     Calcium   Date Value Ref Range Status   09/13/2019 8.8 8.7 - 10.5 mg/dL Final     Total Protein   Date Value Ref Range Status   09/13/2019 6.4 6.0 - 8.4 g/dL Final     Albumin   Date Value Ref Range Status   09/13/2019 3.4 (L) 3.5 - 5.2 g/dL Final     Total Bilirubin   Date Value Ref Range Status   09/13/2019 0.4 0.1 - 1.0 mg/dL Final     Comment:     For infants and newborns, interpretation of results should be based  on gestational age, weight and in agreement with clinical  observations.  Premature Infant recommended reference ranges:  Up to 24 hours.............<8.0 mg/dL  Up to 48 hours............<12.0 mg/dL  3-5 days..................<15.0 mg/dL  6-29 days.................<15.0 mg/dL       Alkaline Phosphatase   Date Value Ref Range Status   09/13/2019 88 55 - 135 U/L Final     AST   Date Value Ref Range Status   09/13/2019 16 10 - 40 U/L Final     ALT   Date Value Ref Range Status   09/13/2019 13 10 - 44 U/L Final     Anion Gap   Date Value Ref Range Status   09/13/2019 8 8 - 16 mmol/L Final     eGFR if    Date Value Ref Range Status    09/13/2019 >60 >60 mL/min/1.73 m^2 Final     eGFR if non    Date Value Ref Range Status   09/13/2019 >60 >60 mL/min/1.73 m^2 Final     Comment:     Calculation used to obtain the estimated glomerular filtration  rate (eGFR) is the CKD-EPI equation.        Lab Results   Component Value Date    [id number] 381 06/25/2018     Lab Results   Component Value Date    TSH 1.374 09/12/2019     2016    CTH Unremarkable     NCS/EMG PN     10-     Labs NL SPEP/IPEP.       09- to 09-    Brain MRI shows B/L cortical (FL) DWI changes.        Carotid U/S which was unremarkable.     EKG and Tele were NSR.     EEG NL    CSF analysis WNL     Assessment:       1. Abnormal MRI    2. Dyslipidemia    3. Hypertension, unspecified type    4. History of stroke 1/16/13 Rt basal ganglia    5. Venous insufficiency    6. CKD (chronic kidney disease) stage 3, GFR 30-59 ml/min    7. Pre-diabetes    8. Bilateral carotid artery disease, unspecified type    9. Keratosis, seborrheic    10. Acromioclavicular joint arthritis, unspecified laterality    11. Bilateral shoulder pain, unspecified chronicity    12. Insomnia, unspecified type    13. Mild neurocognitive disorder    14. CSPN (cryptogenic sensory polyneuropathy)    15. Neuropathy of both feet        Plan:       CSPN    Keep [place] on hold    Avoid driving     Test water with an unaffected hand or foot.    Use caution when trimming nails. File sharp areas    Wear shoes that fit well to avoid pressure points, blisters, and ulcers.    Inspect your hands and feet carefully (including the soles of your feet and between your toes) at least once a week.    Monitor clinically       POSSIBLE MILD NCD/ABNORMAL MRI         AEEG     Comprehensive Neuropsychological testing.    Continue memantine/Namenda 10 mg BID     Continue Trazodone 50 mg QHS.    Falling Down Precautions    Avoid driving and access to firearms     Incremental 24/Care     Help with finances and decision  making.    Join support group.    Proofing the house and use labeling.    Avoid antihistamines and anticholinergics.    Avoid changing routine.    The family was made aware  and given several resources.    Use written reminders.    Avoid multitasking.    Healthy diet, exercise (physical and cognitive).    Good sleep hygiene.      PREVENTION OF DELIRIUM       1. Good hydration and avoid electrolyte imbalance  2. Recognize andtreat infections immediately especially UTI.  3. Bladder emptying and prevent constipation.   4. Provide stimulating activities and familiar objects  5. Use eyeglasses and hearing aids if needed.   6. Use simple and regular communication about people, current place and time  7. Mobility and range-of-motion exercises  8. Reduce noise, lighting and avoid sleep interruptions  9. Non-narcotic pain management.  10.Nondrug treatment for sleep problems or anxiety  11. Avoid antihistamines.  12. Avoid narcotics.  13. Avoid benzodiazepines.           RTC in 3 months

## 2019-09-16 NOTE — PATIENT INSTRUCTIONS
Electroencephalography (EEG)    Electroencephalography (EEG) is a test that measures your brain wave activity. It is used to assess your brain function. Brain cells (or neurons) communicate by producing electrical signals. These signals are measured by the EEG and any abnormalities are detected.  The EEG is safe and painless.  What is EEG used for?  Your doctor may order this test to check for seizures or other brain problems. For this test, several small metal disks (electrodes) are attached to the scalp with adhesives, or with water-based gel or paste. During the test, wavy lines (waveforms) appear on a screen or on paper. They will be studied to assess your brain function. In some people who are prone to seizures, parts of this test may slightly increase their chance of having a seizure. Sometimes it is necessary to repeat an EEG with sleep deprivation. EEG may be performed in a doctor's office or a hospital lab. The test typically takes less than an h our, although much of the time is spent attaching the electrodes. Sometimes, the electrodes are left on for several hours or days so that the EEG test can record brain waves for a longer periods of time. In these cases, you may need to stay in the hospital or can go home with a portable EEG recorder.  Before your test  Prepare for your test as instructed. Wash and dry your hair. But, don't use any hairstyling products. Your scalp and hair should be clean and free of excess oil. Take your routine medications, unless told not to. You may be asked to sleep during the EEG. To help you do this, you may be told to stay up all or part of the night before the test. Or, you may be given medication to help you sleep during the test. If so, someone will need to drive you home after the test. Your test will take about 60 minutes. Arrive with enough time to check in.  My next appointment is:  ______________________________   For your safety and for the success of your test,  tell the technologist about:  · Any medications or herbs you take  · Any seizures you may have had in the past   Date Last Reviewed: 10/19/2015  © 4911-6336 [employer]. [address] [place] [address], [place] [address]. All rights reserved. This information is not intended as a substitute for professional medical care. Always follow your healthcare professional's instructions.

## 2019-09-17 LAB
EEEV IGG TITR CSF IF: NORMAL {TITER}
EEEV IGM TITR CSF IF: NORMAL {TITER}
LACV IGG CSF QL IF: NORMAL
LACV IGM TITR CSF IF: NORMAL {TITER}
SLEV IGG TITR CSF IF: NORMAL {TITER}
SLEV IGM TITR CSF IF: NORMAL {TITER}
WEEV IGG TITR CSF IF: NORMAL {TITER}
WEEV IGM TITR CSF IF: NORMAL {TITER}

## 2019-09-18 NOTE — PLAN OF CARE
09/18/19 0750   Final Note   Assessment Type Final Discharge Note   Anticipated Discharge Disposition Home   Right Care Referral Info   Post Acute Recommendation No Care

## 2019-10-29 ENCOUNTER — IMMUNIZATION (OUTPATIENT)
Dept: FAMILY MEDICINE | Facility: CLINIC | Age: 79
End: 2019-10-29
Payer: MEDICARE

## 2019-10-29 PROCEDURE — 90662 FLU VACCINE - HIGH DOSE (65+) PRESERVATIVE FREE IM: ICD-10-PCS | Mod: HCNC,S$GLB,, | Performed by: FAMILY MEDICINE

## 2019-10-29 PROCEDURE — 90662 IIV NO PRSV INCREASED AG IM: CPT | Mod: HCNC,S$GLB,, | Performed by: FAMILY MEDICINE

## 2019-10-29 PROCEDURE — G0008 FLU VACCINE - HIGH DOSE (65+) PRESERVATIVE FREE IM: ICD-10-PCS | Mod: HCNC,S$GLB,, | Performed by: FAMILY MEDICINE

## 2019-10-29 PROCEDURE — G0008 ADMIN INFLUENZA VIRUS VAC: HCPCS | Mod: HCNC,S$GLB,, | Performed by: FAMILY MEDICINE

## 2019-11-19 ENCOUNTER — PATIENT OUTREACH (OUTPATIENT)
Dept: ADMINISTRATIVE | Facility: HOSPITAL | Age: 79
End: 2019-11-19

## 2019-12-03 ENCOUNTER — TELEPHONE (OUTPATIENT)
Dept: FAMILY MEDICINE | Facility: CLINIC | Age: 79
End: 2019-12-03

## 2019-12-03 RX ORDER — IRBESARTAN 300 MG/1
TABLET ORAL
Qty: 90 TABLET | Refills: 0 | Status: SHIPPED | OUTPATIENT
Start: 2019-12-03 | End: 2020-02-17

## 2019-12-16 ENCOUNTER — OFFICE VISIT (OUTPATIENT)
Dept: NEUROLOGY | Facility: CLINIC | Age: 79
End: 2019-12-16
Payer: MEDICARE

## 2019-12-16 VITALS
RESPIRATION RATE: 16 BRPM | HEIGHT: 66 IN | BODY MASS INDEX: 25.9 KG/M2 | DIASTOLIC BLOOD PRESSURE: 76 MMHG | SYSTOLIC BLOOD PRESSURE: 120 MMHG | WEIGHT: 161.19 LBS | HEART RATE: 72 BPM

## 2019-12-16 DIAGNOSIS — I77.9 BILATERAL CAROTID ARTERY DISEASE, UNSPECIFIED TYPE: Chronic | ICD-10-CM

## 2019-12-16 DIAGNOSIS — M19.019 ACROMIOCLAVICULAR JOINT ARTHRITIS, UNSPECIFIED LATERALITY: ICD-10-CM

## 2019-12-16 DIAGNOSIS — R29.898 LEFT ARM WEAKNESS: ICD-10-CM

## 2019-12-16 DIAGNOSIS — G60.8 CSPN (CRYPTOGENIC SENSORY POLYNEUROPATHY): ICD-10-CM

## 2019-12-16 DIAGNOSIS — R93.89 ABNORMAL MRI: ICD-10-CM

## 2019-12-16 DIAGNOSIS — E78.5 DYSLIPIDEMIA: Chronic | ICD-10-CM

## 2019-12-16 DIAGNOSIS — N18.30 CKD (CHRONIC KIDNEY DISEASE) STAGE 3, GFR 30-59 ML/MIN: Chronic | ICD-10-CM

## 2019-12-16 DIAGNOSIS — L82.1 KERATOSIS, SEBORRHEIC: ICD-10-CM

## 2019-12-16 DIAGNOSIS — R73.03 PRE-DIABETES: Chronic | ICD-10-CM

## 2019-12-16 DIAGNOSIS — M25.511 BILATERAL SHOULDER PAIN, UNSPECIFIED CHRONICITY: ICD-10-CM

## 2019-12-16 DIAGNOSIS — Z86.73 HISTORY OF STROKE: Chronic | ICD-10-CM

## 2019-12-16 DIAGNOSIS — I10 HYPERTENSION, UNSPECIFIED TYPE: Chronic | ICD-10-CM

## 2019-12-16 DIAGNOSIS — G47.00 INSOMNIA, UNSPECIFIED TYPE: ICD-10-CM

## 2019-12-16 DIAGNOSIS — I87.2 VENOUS INSUFFICIENCY: Chronic | ICD-10-CM

## 2019-12-16 DIAGNOSIS — G31.84 MILD NEUROCOGNITIVE DISORDER: Primary | ICD-10-CM

## 2019-12-16 DIAGNOSIS — G57.93 NEUROPATHY OF BOTH FEET: ICD-10-CM

## 2019-12-16 DIAGNOSIS — M25.512 BILATERAL SHOULDER PAIN, UNSPECIFIED CHRONICITY: ICD-10-CM

## 2019-12-16 PROCEDURE — 1126F AMNT PAIN NOTED NONE PRSNT: CPT | Mod: HCNC,S$GLB,, | Performed by: PSYCHIATRY & NEUROLOGY

## 2019-12-16 PROCEDURE — 1101F PR PT FALLS ASSESS DOC 0-1 FALLS W/OUT INJ PAST YR: ICD-10-PCS | Mod: HCNC,CPTII,S$GLB, | Performed by: PSYCHIATRY & NEUROLOGY

## 2019-12-16 PROCEDURE — 1101F PT FALLS ASSESS-DOCD LE1/YR: CPT | Mod: HCNC,CPTII,S$GLB, | Performed by: PSYCHIATRY & NEUROLOGY

## 2019-12-16 PROCEDURE — 99499 RISK ADDL DX/OHS AUDIT: ICD-10-PCS | Mod: HCNC,S$GLB,, | Performed by: PSYCHIATRY & NEUROLOGY

## 2019-12-16 PROCEDURE — 99214 PR OFFICE/OUTPT VISIT, EST, LEVL IV, 30-39 MIN: ICD-10-PCS | Mod: HCNC,S$GLB,, | Performed by: PSYCHIATRY & NEUROLOGY

## 2019-12-16 PROCEDURE — 99214 OFFICE O/P EST MOD 30 MIN: CPT | Mod: HCNC,S$GLB,, | Performed by: PSYCHIATRY & NEUROLOGY

## 2019-12-16 PROCEDURE — 99499 UNLISTED E&M SERVICE: CPT | Mod: HCNC,S$GLB,, | Performed by: PSYCHIATRY & NEUROLOGY

## 2019-12-16 PROCEDURE — 99999 PR PBB SHADOW E&M-EST. PATIENT-LVL IV: ICD-10-PCS | Mod: PBBFAC,HCNC,, | Performed by: PSYCHIATRY & NEUROLOGY

## 2019-12-16 PROCEDURE — 1159F MED LIST DOCD IN RCRD: CPT | Mod: HCNC,S$GLB,, | Performed by: PSYCHIATRY & NEUROLOGY

## 2019-12-16 PROCEDURE — 3078F DIAST BP <80 MM HG: CPT | Mod: HCNC,CPTII,S$GLB, | Performed by: PSYCHIATRY & NEUROLOGY

## 2019-12-16 PROCEDURE — 3078F PR MOST RECENT DIASTOLIC BLOOD PRESSURE < 80 MM HG: ICD-10-PCS | Mod: HCNC,CPTII,S$GLB, | Performed by: PSYCHIATRY & NEUROLOGY

## 2019-12-16 PROCEDURE — 3074F PR MOST RECENT SYSTOLIC BLOOD PRESSURE < 130 MM HG: ICD-10-PCS | Mod: HCNC,CPTII,S$GLB, | Performed by: PSYCHIATRY & NEUROLOGY

## 2019-12-16 PROCEDURE — 99999 PR PBB SHADOW E&M-EST. PATIENT-LVL IV: CPT | Mod: PBBFAC,HCNC,, | Performed by: PSYCHIATRY & NEUROLOGY

## 2019-12-16 PROCEDURE — 3074F SYST BP LT 130 MM HG: CPT | Mod: HCNC,CPTII,S$GLB, | Performed by: PSYCHIATRY & NEUROLOGY

## 2019-12-16 PROCEDURE — 1126F PR PAIN SEVERITY QUANTIFIED, NO PAIN PRESENT: ICD-10-PCS | Mod: HCNC,S$GLB,, | Performed by: PSYCHIATRY & NEUROLOGY

## 2019-12-16 PROCEDURE — 1159F PR MEDICATION LIST DOCUMENTED IN MEDICAL RECORD: ICD-10-PCS | Mod: HCNC,S$GLB,, | Performed by: PSYCHIATRY & NEUROLOGY

## 2019-12-16 RX ORDER — GABAPENTIN 100 MG/1
100 CAPSULE ORAL DAILY PRN
COMMUNITY
End: 2020-02-12 | Stop reason: SDUPTHER

## 2019-12-16 RX ORDER — DONEPEZIL HYDROCHLORIDE 10 MG/1
10 TABLET, FILM COATED ORAL NIGHTLY
Qty: 30 TABLET | Refills: 11 | Status: SHIPPED | OUTPATIENT
Start: 2019-12-16 | End: 2020-11-18

## 2019-12-16 NOTE — PATIENT INSTRUCTIONS
Donepezil tablets  What is this medicine?  DONEPEZIL (nicole NEP e zil) is used to treat mild to moderate dementia caused by Alzheimer's disease.  How should I use this medicine?  Take this medicine by mouth with a glass of water. Follow the directions on the prescription label. You may take this medicine with or without food. Take this medicine at regular intervals. This medicine is usually taken before bedtime. Do not take it more often than directed. Continue to take your medicine even if you feel better. Do not stop taking except on your doctor's advice.  If you are taking the 23 mg donepezil tablet, swallow it whole; do not cut, crush, or chew it.  Talk to your pediatrician regarding the use of this medicine in children. Special care may be needed.  What side effects may I notice from receiving this medicine?  Side effects that you should report to your doctor or health care professional as soon as possible:  · allergic reactions like skin rash, itching or hives, swelling of the face, lips, or tongue  · changes in vision  · feeling faint or lightheaded, falls  · problems with balance  · redness, blistering, peeling or loosening of the skin, including inside the mouth  · slow heartbeat, or palpitations  · stomach pain  · unusual bleeding or bruising, red or purple spots on the skin  · vomiting  · weight loss  Side effects that usually do not require medical attention (report to your doctor or health care professional if they continue or are bothersome):  · diarrhea, especially when starting treatment  · headache  · indigestion or heartburn  · loss of appetite  · muscle cramps  · nausea  What may interact with this medicine?  Do not take this medicine with any of the following medications:  · certain medicines for fungal infections like itraconazole, fluconazole, posaconazole, and voriconazole  · cisapride  · dextromethorphan;  quinidine  · dofetilide  · dronedarone  · pimozide  · quinidine  · thioridazine  · ziprasidone  This medicine may also interact with the following medications:  · antihistamines for allergy, cough and cold  · atropine  · bethanechol  · carbamazepine  · certain medicines for bladder problems like oxybutynin, tolterodine  · certain medicines for Parkinson's disease like benztropine, trihexyphenidyl  · certain medicines for stomach problems like dicyclomine, hyoscyamine  · certain medicines for travel sickness like scopolamine  · dexamethasone  · ipratropium  · NSAIDs, medicines for pain and inflammation, like ibuprofen or naproxen  · other medicines for Alzheimer's disease  · other medicines that prolong the QT interval (cause an abnormal heart rhythm)  · phenobarbital  · phenytoin  · rifampin, rifabutin or rifapentine  What if I miss a dose?  If you miss a dose, take it as soon as you can. If it is almost time for your next dose, take only that dose, do not take double or extra doses.  Where should I keep my medicine?  Keep out of reach of children.  Store at room temperature between 15 and 30 degrees C (59 and 86 degrees F). Throw away any unused medicine after the expiration date.  What should I tell my health care provider before I take this medicine?  They need to know if you have any of these conditions:  · asthma or other lung disease  · difficulty passing urine  · head injury  · heart disease  · history of irregular heartbeat  · liver disease  · seizures (convulsions)  · stomach or intestinal disease, ulcers or stomach bleeding  · an unusual or allergic reaction to donepezil, other medicines, foods, dyes, or preservatives  · pregnant or trying to get pregnant  · breast-feeding  What should I watch for while using this medicine?  Visit your doctor or health care professional for regular checks on your progress. Check with your doctor or health care professional if your symptoms do not get better or if they get  worse.  You may get drowsy or dizzy. Do not drive, use machinery, or do anything that needs mental alertness until you know how this drug affects you.  NOTE:This sheet is a summary. It may not cover all possible information. If you have questions about this medicine, talk to your doctor, pharmacist, or health care provider. Copyright© 2017 [employer]

## 2019-12-16 NOTE — PROGRESS NOTES
"Subjective:       Patient ID: Shannon Solano is a 79 y.o. female.    Chief Complaint: Mild Neurocognitive disorder    HPI       BACKGROUND HISTORY      The patient is accompanied by her daughter who said that she is here for numbness in her feet. The patient started complaining of bilateral symmetric feet paroxysmal tingling and burning pain about 2 years ago.  The patient usually notices the pain after significant physical activity or prolonged sitting or standing.  The pain is paroxysmal and not persistent  The pain and tingling involves the whole feet, mainly the sole area and progresses to the very distal part of the legs.  The patient denies any hand symptoms. No ankle or foot weakness. The patient denies any low back with radiation to the legs.  The patient has good control on her bowel and bladder. No history of DM. No known history of thyroid disease. No history of malignancies.  No history of toxic environmental exposure.  No history of autoimmune disease. No history of excessive alcohol or recreational drug abuse. No history of chronic hepatitis or HIV. No history of malabsorption or nutritional deficiencies. In  B12, FA, TFT are NL. NCS/EMG in 2016 showed PN. She is on  mg TID which seems to be helpful. I ordered SPEP/IPEP, Compound cream. 10- Labs NL SPEP/IPEP. Now she saying that she never had pain! Takes  mg QHS for numbness!! I stopped GBP.    Reviewed the records and there was some concern about her memory. The patient describes some memory lapses especially with "to-do-list" but otherwise fully independent and functions at high level. In  B12, FA, TFT are NL. RPR is -ve. CTH in 2016 is unremarkable and CTH was ordered and pending. No behavioral problems. Recommended monitoring and consider CNP Testing.The daughter is worried about the patient's memory. The patient remains independent and highly functioning but has episodes of memory lapses and confusion that " concerned the daughter like going to the mailbox and not knowing why she was there, forgetting that she went shopping.The patient is also having severe insomnia. I ordered Brain MRI, CNP and started her on Namenda and Trazodone. 09- Brain MRI shows B/L cortical (FL) DWI changes. I admitted the patient to the hospital for thorough evaluation. I started her on Thiamine and ASA. Obtained Carotid U/S which was unremarkable. EKG and Tele were NSR. I also obtained EEG and CSF analysis where WNL as well. No new clinical changes. Trazodone 50 mg QHS helped her with sleep. Tolerating Namenda 10 mg BID very well. I ordered  AEEG, Comprehensive Neuropsychological testing. I continued memantine/Namenda 10 mg BID and Trazodone 50 mg QHS.         INTERVAL HISTORY     Off GBP. Has BLE numbness with no pain.    Trazodone 50 mg QHS helped her with sleep but she takes it PRN. Tolerating Namenda 10 mg BID very well. Refuses AEEG and CNP.  Daughter is still concerned about her memory.       Review of Systems   Constitutional: Negative for appetite change and fatigue.   HENT: Negative for hearing loss and tinnitus.    Eyes: Negative for photophobia and visual disturbance.   Respiratory: Negative for apnea and shortness of breath.    Cardiovascular: Negative for chest pain and palpitations.   Gastrointestinal: Negative for nausea and vomiting.   Endocrine: Negative for cold intolerance and heat intolerance.   Genitourinary: Negative for difficulty urinating and urgency.   Musculoskeletal: Negative for arthralgias, back pain, gait problem, joint swelling, myalgias, neck pain and neck stiffness.   Skin: Negative for color change and rash.   Allergic/Immunologic: Negative for environmental allergies and immunocompromised state.   Neurological: Positive for numbness. Negative for dizziness, tremors, seizures, syncope, facial asymmetry, speech difficulty, weakness, light-headedness and headaches.   Hematological: Negative for  adenopathy. Does not bruise/bleed easily.   Psychiatric/Behavioral: Negative for agitation, behavioral problems, confusion, decreased concentration, dysphoric mood, hallucinations, self-injury, sleep disturbance and suicidal ideas. The patient is not hyperactive.          Current Outpatient Medications:     aspirin (ECOTRIN) 81 MG EC tablet, Take 81 mg by mouth once daily., Disp: , Rfl:     atorvastatin (LIPITOR) 40 MG tablet, Take 1 tablet (40 mg total) by mouth once daily., Disp: 90 tablet, Rfl: 3    folic acid-vit B6-vit B12 2.5-25-2 mg (FOLBIC OR EQUIV) 2.5-25-2 mg Tab, Take 1 tablet by mouth once daily., Disp: 60 tablet, Rfl: 1    gabapentin (NEURONTIN) 100 MG capsule, Take 100 mg by mouth daily as needed., Disp: , Rfl:     irbesartan (AVAPRO) 300 MG tablet, TAKE 1 TABLET BY MOUTH EVERY DAY, Disp: 90 tablet, Rfl: 0    memantine (NAMENDA) 10 MG Tab, Take 1 tablet (10 mg total) by mouth 2 (two) times daily., Disp: 60 tablet, Rfl: 5    multivitamin (ONE DAILY MULTIVITAMIN) per tablet, Take 1 tablet by mouth once daily., Disp: , Rfl:     thiamine (VITAMIN B-1) 100 MG tablet, Take 1 tablet (100 mg total) by mouth once daily., Disp: 60 tablet, Rfl: 1    traZODone (DESYREL) 50 MG tablet, Take 1 tablet (50 mg total) by mouth every evening., Disp: 30 tablet, Rfl: 5    tretinoin (RETIN-A) 0.025 % cream, Apply topically every evening., Disp: 45 g, Rfl: 5    irbesartan (AVAPRO) 150 MG tablet, Take 2 tablets (300 mg total) by mouth every evening. (Patient not taking: Reported on 12/16/2019), Disp: 180 tablet, Rfl: 0  Past Medical History:   Diagnosis Date    Anemia     Arthritis     Cataract     Disorder of kidney and ureter     Hyperlipidemia     Hypertension     Insomnia 8/28/2019    Osteoarthritis     Polyneuropathy     Stroke      Past Surgical History:   Procedure Laterality Date    APPENDECTOMY      CATARACT EXTRACTION Bilateral 2014    EYE SURGERY Bilateral     HYSTERECTOMY      VARICOSE  VEIN SURGERY       Social History     Socioeconomic History    Marital status:      Spouse name: Not on file    Number of children: 2    Years of education: Not on file    Highest education level: Not on file   Occupational History    Not on file   Social Needs    Financial resource strain: Not on file    Food insecurity:     Worry: Not on file     Inability: Not on file    Transportation needs:     Medical: Not on file     Non-medical: Not on file   Tobacco Use    Smoking status: Never Smoker    Smokeless tobacco: Never Used   Substance and Sexual Activity    Alcohol use: No    Drug use: No    Sexual activity: Not Currently   Lifestyle    Physical activity:     Days per week: Not on file     Minutes per session: Not on file    Stress: Not on file   Relationships    Social connections:     Talks on phone: Not on file     Gets together: Not on file     Attends L[employer] service: Not on file     Active member of club or organization: Not on file     Attends meetings of clubs or organizations: Not on file     Relationship status: Not on file   Other Topics Concern    Are you pregnant or think you may be? No    Breast-feeding No   Social History Narrative    Not on file       Past/Current Medical/Surgical History, Past/Current Social History, Past/Current Family History and Past/Current Medications were reviewed in detail.        Objective:     GENERAL APPEARANCE:     The patient looks comfortable.    No signs of medical or psychiatric distress.    Normal breathing pattern.    No dysmorphic features    Normal eye contact.     GENERAL MEDICAL EXAM:    HEENT:  Head is atraumatic normocephalic.     Neck and Axillae: No JVD.    Cardiopulmonary: No cyanosis. No tachypnea. Normal respiratory effort.    Gastrointestinal:  No stomas or lesions. No hernias.    Skin, Hair and Nails: No pathognonomic skin rash. No neurofibromatosis. No stigmata of autoimmune disease.     Limbs: Varicose veins. No edema.      Muskoskeletal: No deformities.No signs of longstanding neuropathy. No dislocations or fractures.        Neurologic Exam     Mental Status   Oriented to person, place, and time.   Registration: recalls 3 of 3 objects. Recall at 5 minutes: recalls 3 of 3 objects. Follows 3 step commands.   Attention: normal. Concentration: normal.   Speech: speech is normal   Level of consciousness: alert  Knowledge: good and consistent with education. Able to perform simple calculations.   Able to name object. Able to read. Able to repeat. Able to write. Normal comprehension.     MOCA 30>30>30>30    Visuospatial/Executive  5   Naming                          3  Attention                        6  Language                      3  Abstraction                    2  Recall                            5  Orientation                     6       Cranial Nerves     CN II   Visual fields full to confrontation.   Visual acuity: normal  Right visual field deficit: none  Left visual field deficit: none     CN III, IV, VI   Pupils are equal, round, and reactive to light.  Extraocular motions are normal.   Right pupil: Size: 2 mm. Shape: regular. Reactivity: brisk. Consensual response: intact. Accommodation: intact.   Left pupil: Size: 2 mm. Shape: regular. Reactivity: brisk. Consensual response: intact. Accommodation: intact.   CN III: no CN III palsy  CN VI: no CN VI palsy  Nystagmus: none   Diplopia: none  Ophthalmoparesis: none  Upgaze: normal  Downgaze: normal  Conjugate gaze: present  Vestibulo-ocular reflex: present    CN V   Facial sensation intact.   Right facial sensation deficit: none  Left facial sensation deficit: none  Right corneal reflex: normal  Left corneal reflex: normal    CN VII   Right facial weakness: none  Left facial weakness: none  Right taste: normal  Left taste: normal    CN VIII   CN VIII normal.   Hearing: intact  Right Rinne: AC > BC  Left Rinne: AC > BC  Guzmán: does not lateralize     CN IX, X   CN IX normal.    CN X normal.   Palate: symmetric  Right gag reflex: normal  Left gag reflex: normal    CN XI   CN XI normal.   Right sternocleidomastoid strength: normal  Left sternocleidomastoid strength: normal  Right trapezius strength: normal  Left trapezius strength: normal    CN XII   CN XII normal.   Tongue: not atrophic  Fasciculations: absent  Tongue deviation: none    Motor Exam   Muscle bulk: normal  Overall muscle tone: normal  Right arm tone: normal  Left arm tone: normal  Right arm pronator drift: absent  Left arm pronator drift: absent  Right leg tone: normal  Left leg tone: normal    Strength   Strength 5/5 throughout.   Right neck flexion: 5/5  Left neck flexion: 5/5  Right neck extension: 5/5  Left neck extension: 5/5  Right deltoid: 5/5  Left deltoid: 5/5  Right biceps: 5/5  Left biceps: 5/5  Right triceps: 5/5  Left triceps: 5/5  Right wrist flexion: 5/5  Left wrist flexion: 5/5  Right wrist extension: 5/5  Left wrist extension: 5/5  Right interossei: 5/5  Left interossei: 5/5  Right abdominals: 5/5  Left abdominals: 5/5  Right iliopsoas: 5/5  Left iliopsoas: 5/5  Right quadriceps: 5/5  Left quadriceps: 5/5  Right hamstrin/5  Left hamstrin/5  Right glutei: 5/5  Left glutei: 5/5  Right anterior tibial: 5/5  Left anterior tibial: 5/5  Right posterior tibial: 5/5  Left posterior tibial: 5/5  Right peroneal: 5/5  Left peroneal: 5/5  Right gastroc: 5/5  Left gastroc: 5/5    Sensory Exam   Right arm light touch: decreased from fingers  Left arm light touch: decreased from fingers  Right leg light touch: decreased from toes  Left leg light touch: decreased from toes  Right arm vibration: normal  Left arm vibration: normal  Right leg vibration: decreased from toes  Left leg vibration: decreased from toes  Right arm proprioception: normal  Left arm proprioception: normal  Right leg proprioception: decreased from toes  Left leg proprioception: decreased from toes  Right arm pinprick: decreased from fingers  Left  arm pinprick: decreased from fingers  Right leg pinprick: decreased from toes  Left leg pinprick: decreased from toes  Graphesthesia: normal  Stereognosis: normal    Gait, Coordination, and Reflexes     Gait  Gait: wide-based    Coordination   Romberg: positive  Finger to nose coordination: normal  Heel to shin coordination: normal    Tremor   Resting tremor: absent  Intention tremor: absent  Action tremor: absent    Reflexes   Right brachioradialis: 2+  Left brachioradialis: 2+  Right biceps: 2+  Left biceps: 2+  Right triceps: 2+  Left triceps: 2+  Right patellar: 1+  Left patellar: 1+  Right achilles: 0  Left achilles: 0  Right plantar: normal  Left plantar: normal  Right Ochoa: absent  Left Ochoa: absent  Right ankle clonus: absent  Left ankle clonus: absent  Right pendular knee jerk: absent  Left pendular knee jerk: absent      Lab Results   Component Value Date    WBC 7.30 09/13/2019    HGB 13.2 09/13/2019    HCT 40.6 09/13/2019    MCV 93 09/13/2019     (L) 09/13/2019     Sodium   Date Value Ref Range Status   09/13/2019 142 136 - 145 mmol/L Final     Potassium   Date Value Ref Range Status   09/13/2019 4.1 3.5 - 5.1 mmol/L Final     Chloride   Date Value Ref Range Status   09/13/2019 107 95 - 110 mmol/L Final     CO2   Date Value Ref Range Status   09/13/2019 27 23 - 29 mmol/L Final     Glucose   Date Value Ref Range Status   09/13/2019 87 70 - 110 mg/dL Final     BUN, Bld   Date Value Ref Range Status   09/13/2019 21 8 - 23 mg/dL Final     Creatinine   Date Value Ref Range Status   09/13/2019 0.9 0.5 - 1.4 mg/dL Final     Calcium   Date Value Ref Range Status   09/13/2019 8.8 8.7 - 10.5 mg/dL Final     Total Protein   Date Value Ref Range Status   09/13/2019 6.4 6.0 - 8.4 g/dL Final     Albumin   Date Value Ref Range Status   09/13/2019 3.4 (L) 3.5 - 5.2 g/dL Final     Total Bilirubin   Date Value Ref Range Status   09/13/2019 0.4 0.1 - 1.0 mg/dL Final     Comment:     For infants and newborns,  interpretation of results should be based  on gestational age, weight and in agreement with clinical  observations.  Premature Infant recommended reference ranges:  Up to 24 hours.............<8.0 mg/dL  Up to 48 hours............<12.0 mg/dL  3-5 days..................<15.0 mg/dL  6-29 days.................<15.0 mg/dL       Alkaline Phosphatase   Date Value Ref Range Status   09/13/2019 88 55 - 135 U/L Final     AST   Date Value Ref Range Status   09/13/2019 16 10 - 40 U/L Final     ALT   Date Value Ref Range Status   09/13/2019 13 10 - 44 U/L Final     Anion Gap   Date Value Ref Range Status   09/13/2019 8 8 - 16 mmol/L Final     eGFR if    Date Value Ref Range Status   09/13/2019 >60 >60 mL/min/1.73 m^2 Final     eGFR if non    Date Value Ref Range Status   09/13/2019 >60 >60 mL/min/1.73 m^2 Final     Comment:     Calculation used to obtain the estimated glomerular filtration  rate (eGFR) is the CKD-EPI equation.        Lab Results   Component Value Date    [id number] 381 06/25/2018     Lab Results   Component Value Date    TSH 1.374 09/12/2019     2016    CTH Unremarkable     NCS/EMG PN     10-     Labs NL SPEP/IPEP.       09- to 09-    Brain MRI shows B/L cortical (FL) DWI changes.        Carotid U/S which was unremarkable.     EKG and Tele were NSR.     EEG NL    CSF analysis WNL     Assessment:       1. Mild neurocognitive disorder    2. Dyslipidemia    3. Hypertension, unspecified type    4. History of stroke 1/16/13 Rt basal ganglia    5. Venous insufficiency    6. CKD (chronic kidney disease) stage 3, GFR 30-59 ml/min    7. Pre-diabetes    8. Bilateral carotid artery disease, unspecified type    9. Keratosis, seborrheic    10. Acromioclavicular joint arthritis, unspecified laterality    11. Bilateral shoulder pain, unspecified chronicity    12. Left arm weakness    13. Neuropathy of both feet    14. CSPN (cryptogenic sensory polyneuropathy)    15.  Insomnia, unspecified type    16. Abnormal MRI        Plan:               CSPN    Keep GBP on hold    Avoid driving     Test water with an unaffected hand or foot.    Use caution when trimming nails. File sharp areas    Wear shoes that fit well to avoid pressure points, blisters, and ulcers.    Inspect your hands and feet carefully (including the soles of your feet and between your toes) at least once a week.    Monitor clinically       POSSIBLE MILD NCD/ABNORMAL MRI         Refusing AEEG, but she is open to it now.     Refuses Comprehensive Neuropsychological testing but she is open to it right now.     Continue memantine/Namenda 10 mg BID.    Start Aricept and titrate to 10 mg QHS.     Continue Trazodone 50 mg QHS.    Falling Down Precautions    Avoid driving and access to firearms     Incremental 24/Care     Help with finances and decision making.    Join support group.    Proofing the house and use labeling.    Avoid antihistamines and anticholinergics.    Avoid changing routine.    The family was made aware  and given several resources.    Use written reminders.    Avoid multitasking.    Healthy diet, exercise (physical and cognitive).    Good sleep hygiene.      PREVENTION OF DELIRIUM       1. Good hydration and avoid electrolyte imbalance  2. Recognize andtreat infections immediately especially UTI.  3. Bladder emptying and prevent constipation.   4. Provide stimulating activities and familiar objects  5. Use eyeglasses and hearing aids if needed.   6. Use simple and regular communication about people, current place and time  7. Mobility and range-of-motion exercises  8. Reduce noise, lighting and avoid sleep interruptions  9. Non-narcotic pain management.  10.Nondrug treatment for sleep problems or anxiety  11. Avoid antihistamines.  12. Avoid narcotics.  13. Avoid benzodiazepines.           I spent 39  minutes face to face with the patient    More than 20  minutes of the time spent in counseling and  coordination of care including discussions etiology of diagnosis, pathonogenesis of diagnosis, prognosis of diagnosis,, diagnostic results, impression and recommendations, diagnostic studies, management, risks and benefits of treatment, instructions of disease self management, treatment instructions, follow up requirements, patient and family counseling/involvement in care compliance with treatment regimen. All of the patient's questions were answered during this discussion.        RTC in 6 months

## 2019-12-23 ENCOUNTER — TELEPHONE (OUTPATIENT)
Dept: NEUROLOGY | Facility: CLINIC | Age: 79
End: 2019-12-23

## 2019-12-23 NOTE — TELEPHONE ENCOUNTER
----- Message from Scarlett Vasquez sent at 12/23/2019  9:41 AM CST -----  Contact: self  states that she doesn't want testing done today, please inform (unsure of person's name)...[phone]

## 2020-01-17 PROCEDURE — 95711 VEEG 2-12 HR UNMONITORED: CPT | Mod: HCNC | Performed by: PSYCHIATRY & NEUROLOGY

## 2020-01-17 PROCEDURE — 95722 EEG PHY/QHP>36<60 HR W/VEEG: CPT | Mod: HCNC,S$GLB,, | Performed by: PSYCHIATRY & NEUROLOGY

## 2020-01-17 PROCEDURE — 95722 PR EEG, W/VIDEO, CONT RECORD, CMPLT STDY, I&R, >36<60 HRS: ICD-10-PCS | Mod: HCNC,S$GLB,, | Performed by: PSYCHIATRY & NEUROLOGY

## 2020-01-17 PROCEDURE — 95700 EEG CONT REC W/VID EEG TECH: CPT | Mod: HCNC | Performed by: PSYCHIATRY & NEUROLOGY

## 2020-01-19 PROCEDURE — 95714 VEEG EA 12-26 HR UNMNTR: CPT | Mod: HCNC | Performed by: PSYCHIATRY & NEUROLOGY

## 2020-01-20 PROCEDURE — 95714 VEEG EA 12-26 HR UNMNTR: CPT | Mod: HCNC | Performed by: PSYCHIATRY & NEUROLOGY

## 2020-01-21 ENCOUNTER — TELEPHONE (OUTPATIENT)
Dept: NEUROLOGY | Facility: CLINIC | Age: 80
End: 2020-01-21

## 2020-01-21 ENCOUNTER — HOSPITAL ENCOUNTER (OUTPATIENT)
Dept: PULMONOLOGY | Facility: HOSPITAL | Age: 80
Discharge: HOME | End: 2020-01-21
Attending: PSYCHIATRY & NEUROLOGY
Payer: MEDICARE

## 2020-01-21 NOTE — TELEPHONE ENCOUNTER
----- Message from An Escobar sent at 1/21/2020  9:14 AM CST -----  Contact: Sussy-daughter  Requesting a call back to know the Doctor that patient was referred to. They are needing a contact number to have appt rescheduled. Please call Sussy back at [phone]

## 2020-01-21 NOTE — TELEPHONE ENCOUNTER
Daughter called in to get name of [facility] and telephone number inf given for Dr. Aj [phone]

## 2020-01-27 ENCOUNTER — TELEPHONE (OUTPATIENT)
Dept: NEUROLOGY | Facility: CLINIC | Age: 80
End: 2020-01-27

## 2020-01-27 NOTE — TELEPHONE ENCOUNTER
----- Message from Kortney Kidd sent at 1/27/2020 12:26 PM CST -----  Contact: Sussy-daughter  .Type:  Test Results    Who Called: Sussy  Name of Test (Lab/Mammo/Etc): brain test  Date of Test: 1/22/20  Ordering Provider: Cesar  Where the test was performed: [facility]  Would the patient rather a call back or a response via [facility]? Call back  Best Call Back Number: [phone]  Additional Information:

## 2020-01-27 NOTE — TELEPHONE ENCOUNTER
----- Message from Michelle Rehman sent at 1/27/2020  1:04 PM CST -----  Contact: Pt  Pt asked that nurse return her call for a follow up on results. ([phone])

## 2020-01-27 NOTE — TELEPHONE ENCOUNTER
Advised daughter that it takes about 2 weeks for us to get results from Dr. Hall office . Will contact her with results . She verbalized understanding .

## 2020-01-27 NOTE — TELEPHONE ENCOUNTER
----- Message from Soledad Mckeon sent at 1/27/2020  1:49 PM CST -----  Contact: the pt daughter  Type:  Sooner Apoointment Request    Caller is requesting a sooner appointment.  Caller declined first available appointment listed below.  Caller will not accept being placed on the waitlist and is requesting a message be sent to doctor.  Name of Caller: daughter  When is the first available appointment? 05/04/2020  Symptoms: f/u  Would the patient rather a call back or a response via [facility]? Call back  Best Call Back Number: [phone] or [phone]  Additional Information: n/a

## 2020-02-04 ENCOUNTER — TELEPHONE (OUTPATIENT)
Dept: ADMINISTRATIVE | Facility: HOSPITAL | Age: 80
End: 2020-02-04

## 2020-02-04 NOTE — TELEPHONE ENCOUNTER
Working the [employer] not seen in 12 months report. Spoke with daughter Sussy she could will call back to schedule annual with Dr. Poe she was busy at the time I called/

## 2020-02-10 ENCOUNTER — TELEPHONE (OUTPATIENT)
Dept: NEUROLOGY | Facility: CLINIC | Age: 80
End: 2020-02-10

## 2020-02-10 NOTE — TELEPHONE ENCOUNTER
----- Message from Juanita Lucero sent at 2/10/2020  4:54 PM CST -----  Contact: pt daughter - Ms Sussy Harrington   Stated she calling about test result from a home visit with pt , she can be reached at [phone] Thanks

## 2020-02-11 ENCOUNTER — TELEPHONE (OUTPATIENT)
Dept: FAMILY MEDICINE | Facility: CLINIC | Age: 80
End: 2020-02-11

## 2020-02-11 NOTE — TELEPHONE ENCOUNTER
----- Message from Lew Vaughan sent at 2/11/2020  2:32 PM CST -----  Contact: Pt  Please call Shannon to discuss her diabetes question [phone].

## 2020-02-12 ENCOUNTER — OFFICE VISIT (OUTPATIENT)
Dept: FAMILY MEDICINE | Facility: CLINIC | Age: 80
End: 2020-02-12
Attending: FAMILY MEDICINE
Payer: MEDICARE

## 2020-02-12 VITALS
SYSTOLIC BLOOD PRESSURE: 136 MMHG | DIASTOLIC BLOOD PRESSURE: 78 MMHG | HEART RATE: 78 BPM | TEMPERATURE: 97 F | OXYGEN SATURATION: 97 % | HEIGHT: 66 IN | BODY MASS INDEX: 25.15 KG/M2 | WEIGHT: 156.5 LBS

## 2020-02-12 DIAGNOSIS — I10 HYPERTENSION, UNSPECIFIED TYPE: ICD-10-CM

## 2020-02-12 DIAGNOSIS — N18.30 CKD (CHRONIC KIDNEY DISEASE) STAGE 3, GFR 30-59 ML/MIN: ICD-10-CM

## 2020-02-12 DIAGNOSIS — R79.9 ABNORMAL FINDING OF BLOOD CHEMISTRY, UNSPECIFIED: ICD-10-CM

## 2020-02-12 DIAGNOSIS — I65.23 BILATERAL CAROTID ARTERY STENOSIS: Primary | ICD-10-CM

## 2020-02-12 DIAGNOSIS — G60.8 CSPN (CRYPTOGENIC SENSORY POLYNEUROPATHY): ICD-10-CM

## 2020-02-12 DIAGNOSIS — I77.9 CAROTID ARTERY DISEASE, UNSPECIFIED LATERALITY, UNSPECIFIED TYPE: ICD-10-CM

## 2020-02-12 PROCEDURE — 3075F PR MOST RECENT SYSTOLIC BLOOD PRESS GE 130-139MM HG: ICD-10-PCS | Mod: HCNC,CPTII,S$GLB, | Performed by: FAMILY MEDICINE

## 2020-02-12 PROCEDURE — 1126F AMNT PAIN NOTED NONE PRSNT: CPT | Mod: HCNC,S$GLB,, | Performed by: FAMILY MEDICINE

## 2020-02-12 PROCEDURE — 99999 PR PBB SHADOW E&M-EST. PATIENT-LVL III: ICD-10-PCS | Mod: PBBFAC,HCNC,, | Performed by: FAMILY MEDICINE

## 2020-02-12 PROCEDURE — 99214 PR OFFICE/OUTPT VISIT, EST, LEVL IV, 30-39 MIN: ICD-10-PCS | Mod: HCNC,S$GLB,, | Performed by: FAMILY MEDICINE

## 2020-02-12 PROCEDURE — 1159F MED LIST DOCD IN RCRD: CPT | Mod: HCNC,S$GLB,, | Performed by: FAMILY MEDICINE

## 2020-02-12 PROCEDURE — 3288F FALL RISK ASSESSMENT DOCD: CPT | Mod: HCNC,CPTII,S$GLB, | Performed by: FAMILY MEDICINE

## 2020-02-12 PROCEDURE — 99214 OFFICE O/P EST MOD 30 MIN: CPT | Mod: HCNC,S$GLB,, | Performed by: FAMILY MEDICINE

## 2020-02-12 PROCEDURE — 1100F PR PT FALLS ASSESS DOC 2+ FALLS/FALL W/INJURY/YR: ICD-10-PCS | Mod: HCNC,CPTII,S$GLB, | Performed by: FAMILY MEDICINE

## 2020-02-12 PROCEDURE — 99499 UNLISTED E&M SERVICE: CPT | Mod: HCNC,S$GLB,, | Performed by: FAMILY MEDICINE

## 2020-02-12 PROCEDURE — 3078F PR MOST RECENT DIASTOLIC BLOOD PRESSURE < 80 MM HG: ICD-10-PCS | Mod: HCNC,CPTII,S$GLB, | Performed by: FAMILY MEDICINE

## 2020-02-12 PROCEDURE — 3075F SYST BP GE 130 - 139MM HG: CPT | Mod: HCNC,CPTII,S$GLB, | Performed by: FAMILY MEDICINE

## 2020-02-12 PROCEDURE — 1126F PR PAIN SEVERITY QUANTIFIED, NO PAIN PRESENT: ICD-10-PCS | Mod: HCNC,S$GLB,, | Performed by: FAMILY MEDICINE

## 2020-02-12 PROCEDURE — 3288F PR FALLS RISK ASSESSMENT DOCUMENTED: ICD-10-PCS | Mod: HCNC,CPTII,S$GLB, | Performed by: FAMILY MEDICINE

## 2020-02-12 PROCEDURE — 1100F PTFALLS ASSESS-DOCD GE2>/YR: CPT | Mod: HCNC,CPTII,S$GLB, | Performed by: FAMILY MEDICINE

## 2020-02-12 PROCEDURE — 99499 RISK ADDL DX/OHS AUDIT: ICD-10-PCS | Mod: HCNC,S$GLB,, | Performed by: FAMILY MEDICINE

## 2020-02-12 PROCEDURE — 3078F DIAST BP <80 MM HG: CPT | Mod: HCNC,CPTII,S$GLB, | Performed by: FAMILY MEDICINE

## 2020-02-12 PROCEDURE — 1159F PR MEDICATION LIST DOCUMENTED IN MEDICAL RECORD: ICD-10-PCS | Mod: HCNC,S$GLB,, | Performed by: FAMILY MEDICINE

## 2020-02-12 PROCEDURE — 99999 PR PBB SHADOW E&M-EST. PATIENT-LVL III: CPT | Mod: PBBFAC,HCNC,, | Performed by: FAMILY MEDICINE

## 2020-02-12 RX ORDER — GABAPENTIN 100 MG/1
100 CAPSULE ORAL DAILY PRN
Qty: 90 CAPSULE | Refills: 2 | Status: SHIPPED | OUTPATIENT
Start: 2020-02-12 | End: 2020-04-29

## 2020-02-12 NOTE — PROGRESS NOTES
Subjective:       Patient ID: Shannon Solano is a 79 y.o. female.    Chief Complaint: Annual Exam    79 y old female with stage 3 ckd, htn , carotid stenosis  , hx of CVA and neuropathy here for f.u . Doing well . Sustained  a fall last w. She did not get hurt . Unable to fall asleep due to leg numbness  . She has been out of gabapentin for months . Avoid NSAID. Lives with . Independent with hygiene , cooking and cleaaning  .     Review of Systems   Constitutional: Negative.    HENT: Negative.    Eyes: Negative.    Respiratory: Negative.    Cardiovascular: Negative.    Gastrointestinal: Negative.    Genitourinary: Negative.    Musculoskeletal: Negative.    Skin: Negative.    Hematological: Negative.        Objective:      Physical Exam   Constitutional: She is oriented to person, place, and time. She appears well-developed and well-nourished. No distress.   HENT:   Head: Normocephalic and atraumatic.   Right Ear: External ear normal.   Left Ear: External ear normal.   Mouth/Throat: No oropharyngeal exudate.   Eyes: Pupils are equal, round, and reactive to light. Conjunctivae and EOM are normal. Right eye exhibits no discharge. Left eye exhibits no discharge. No scleral icterus.   Neck: Normal range of motion. Neck supple. No JVD present. No tracheal deviation present. No thyromegaly present.   Cardiovascular: Normal rate, regular rhythm and normal heart sounds. Exam reveals no gallop and no friction rub.   No murmur heard.  Pulmonary/Chest: Effort normal and breath sounds normal. No stridor. No respiratory distress. She has no wheezes. She has no rales. She exhibits no tenderness.   Abdominal: Soft. Bowel sounds are normal. She exhibits no distension. There is no tenderness. There is no rebound and no guarding.   Musculoskeletal: Normal range of motion.   Lymphadenopathy:     She has no cervical adenopathy.   Neurological: She is alert and oriented to person, place, and time.   Skin: Skin is warm and dry. She  is not diaphoretic.   Psychiatric: She has a normal mood and affect. Her behavior is normal. Judgment and thought content normal.       Assessment:     Shannon was seen today for annual exam.    Diagnoses and all orders for this visit:    Bilateral carotid artery stenosis  -     CBC auto differential; Future  -     Comprehensive metabolic panel; Future  -     Lipid panel; Future    Carotid artery disease, unspecified laterality, unspecified type  -     CBC auto differential; Future  -     Comprehensive metabolic panel; Future  -     Hemoglobin A1c; Future  -     Lipid panel; Future    CKD (chronic kidney disease) stage 3, GFR 30-59 ml/min  -     CBC auto differential; Future  -     Comprehensive metabolic panel; Future  -     Hemoglobin A1c; Future  -     Lipid panel; Future    Hypertension, unspecified type    CSPN (cryptogenic sensory polyneuropathy)    Abnormal finding of blood chemistry, unspecified   -     Hemoglobin A1c; Future    Other orders  -     gabapentin (NEURONTIN) 100 MG capsule; Take 1 capsule (100 mg total) by mouth daily as needed.      Plan:   Statin+ aspirin  Avoid NSAIDS  Controlled. Cont med   Resume gabapentin

## 2020-02-14 ENCOUNTER — LAB VISIT (OUTPATIENT)
Dept: LAB | Facility: HOSPITAL | Age: 80
End: 2020-02-14
Attending: FAMILY MEDICINE
Payer: MEDICARE

## 2020-02-14 DIAGNOSIS — N18.30 CKD (CHRONIC KIDNEY DISEASE) STAGE 3, GFR 30-59 ML/MIN: ICD-10-CM

## 2020-02-14 DIAGNOSIS — I65.23 BILATERAL CAROTID ARTERY STENOSIS: ICD-10-CM

## 2020-02-14 DIAGNOSIS — I77.9 CAROTID ARTERY DISEASE, UNSPECIFIED LATERALITY, UNSPECIFIED TYPE: ICD-10-CM

## 2020-02-14 DIAGNOSIS — R79.9 ABNORMAL FINDING OF BLOOD CHEMISTRY, UNSPECIFIED: ICD-10-CM

## 2020-02-14 LAB
ALBUMIN SERPL BCP-MCNC: 3.7 G/DL (ref 3.5–5.2)
ALP SERPL-CCNC: 101 U/L (ref 55–135)
ALT SERPL W/O P-5'-P-CCNC: 15 U/L (ref 10–44)
ANION GAP SERPL CALC-SCNC: 8 MMOL/L (ref 8–16)
AST SERPL-CCNC: 27 U/L (ref 10–40)
BASOPHILS # BLD AUTO: 0.04 K/UL (ref 0–0.2)
BASOPHILS NFR BLD: 0.6 % (ref 0–1.9)
BILIRUB SERPL-MCNC: 0.6 MG/DL (ref 0.1–1)
BUN SERPL-MCNC: 20 MG/DL (ref 8–23)
CALCIUM SERPL-MCNC: 9.4 MG/DL (ref 8.7–10.5)
CHLORIDE SERPL-SCNC: 108 MMOL/L (ref 95–110)
CHOLEST SERPL-MCNC: 207 MG/DL (ref 120–199)
CHOLEST/HDLC SERPL: 4.3 {RATIO} (ref 2–5)
CO2 SERPL-SCNC: 27 MMOL/L (ref 23–29)
CREAT SERPL-MCNC: 1 MG/DL (ref 0.5–1.4)
DIFFERENTIAL METHOD: ABNORMAL
EOSINOPHIL # BLD AUTO: 0.2 K/UL (ref 0–0.5)
EOSINOPHIL NFR BLD: 2.5 % (ref 0–8)
ERYTHROCYTE [DISTWIDTH] IN BLOOD BY AUTOMATED COUNT: 13.4 % (ref 11.5–14.5)
EST. GFR  (AFRICAN AMERICAN): >60 ML/MIN/1.73 M^2
EST. GFR  (NON AFRICAN AMERICAN): 53 ML/MIN/1.73 M^2
GLUCOSE SERPL-MCNC: 93 MG/DL (ref 70–110)
HCT VFR BLD AUTO: 43.8 % (ref 37–48.5)
HDLC SERPL-MCNC: 48 MG/DL (ref 40–75)
HDLC SERPL: 23.2 % (ref 20–50)
HGB BLD-MCNC: 13.4 G/DL (ref 12–16)
IMM GRANULOCYTES # BLD AUTO: 0.03 K/UL (ref 0–0.04)
IMM GRANULOCYTES NFR BLD AUTO: 0.4 % (ref 0–0.5)
LDLC SERPL CALC-MCNC: 139.6 MG/DL (ref 63–159)
LYMPHOCYTES # BLD AUTO: 1.9 K/UL (ref 1–4.8)
LYMPHOCYTES NFR BLD: 27.4 % (ref 18–48)
MCH RBC QN AUTO: 29.8 PG (ref 27–31)
MCHC RBC AUTO-ENTMCNC: 30.6 G/DL (ref 32–36)
MCV RBC AUTO: 98 FL (ref 82–98)
MONOCYTES # BLD AUTO: 0.6 K/UL (ref 0.3–1)
MONOCYTES NFR BLD: 8.2 % (ref 4–15)
NEUTROPHILS # BLD AUTO: 4.2 K/UL (ref 1.8–7.7)
NEUTROPHILS NFR BLD: 60.9 % (ref 38–73)
NONHDLC SERPL-MCNC: 159 MG/DL
NRBC BLD-RTO: 0 /100 WBC
PLATELET # BLD AUTO: 160 K/UL (ref 150–350)
PMV BLD AUTO: 10.8 FL (ref 9.2–12.9)
POTASSIUM SERPL-SCNC: 4.6 MMOL/L (ref 3.5–5.1)
PROT SERPL-MCNC: 6.9 G/DL (ref 6–8.4)
RBC # BLD AUTO: 4.49 M/UL (ref 4–5.4)
SODIUM SERPL-SCNC: 143 MMOL/L (ref 136–145)
TRIGL SERPL-MCNC: 97 MG/DL (ref 30–150)
WBC # BLD AUTO: 6.82 K/UL (ref 3.9–12.7)

## 2020-02-14 PROCEDURE — 36415 COLL VENOUS BLD VENIPUNCTURE: CPT | Mod: HCNC,PO

## 2020-02-14 PROCEDURE — 80061 LIPID PANEL: CPT | Mod: HCNC

## 2020-02-14 PROCEDURE — 80053 COMPREHEN METABOLIC PANEL: CPT | Mod: HCNC

## 2020-02-14 PROCEDURE — 85025 COMPLETE CBC W/AUTO DIFF WBC: CPT | Mod: HCNC

## 2020-02-14 PROCEDURE — 83036 HEMOGLOBIN GLYCOSYLATED A1C: CPT | Mod: HCNC

## 2020-02-15 LAB
ESTIMATED AVG GLUCOSE: 117 MG/DL (ref 68–131)
HBA1C MFR BLD HPLC: 5.7 % (ref 4–5.6)

## 2020-02-17 ENCOUNTER — TELEPHONE (OUTPATIENT)
Dept: NEUROLOGY | Facility: CLINIC | Age: 80
End: 2020-02-17

## 2020-02-17 DIAGNOSIS — R41.3 MEMORY DIFFICULTIES: ICD-10-CM

## 2020-02-17 DIAGNOSIS — I77.9 BILATERAL CAROTID ARTERY DISEASE, UNSPECIFIED TYPE: Chronic | ICD-10-CM

## 2020-02-17 DIAGNOSIS — M19.019 ACROMIOCLAVICULAR JOINT ARTHRITIS, UNSPECIFIED LATERALITY: ICD-10-CM

## 2020-02-17 DIAGNOSIS — G31.84 MILD NEUROCOGNITIVE DISORDER: ICD-10-CM

## 2020-02-17 DIAGNOSIS — I87.2 VENOUS INSUFFICIENCY: Chronic | ICD-10-CM

## 2020-02-17 DIAGNOSIS — M25.511 BILATERAL SHOULDER PAIN, UNSPECIFIED CHRONICITY: ICD-10-CM

## 2020-02-17 DIAGNOSIS — R73.03 PRE-DIABETES: Chronic | ICD-10-CM

## 2020-02-17 DIAGNOSIS — R29.898 LEFT ARM WEAKNESS: ICD-10-CM

## 2020-02-17 DIAGNOSIS — Z86.73 HISTORY OF STROKE: Chronic | ICD-10-CM

## 2020-02-17 DIAGNOSIS — N18.30 CKD (CHRONIC KIDNEY DISEASE) STAGE 3, GFR 30-59 ML/MIN: Chronic | ICD-10-CM

## 2020-02-17 DIAGNOSIS — I10 HYPERTENSION, UNSPECIFIED TYPE: Chronic | ICD-10-CM

## 2020-02-17 DIAGNOSIS — E78.5 DYSLIPIDEMIA: Chronic | ICD-10-CM

## 2020-02-17 DIAGNOSIS — G60.8 CSPN (CRYPTOGENIC SENSORY POLYNEUROPATHY): ICD-10-CM

## 2020-02-17 DIAGNOSIS — L82.1 KERATOSIS, SEBORRHEIC: ICD-10-CM

## 2020-02-17 DIAGNOSIS — M25.512 BILATERAL SHOULDER PAIN, UNSPECIFIED CHRONICITY: ICD-10-CM

## 2020-02-17 DIAGNOSIS — G57.93 NEUROPATHY OF BOTH FEET: ICD-10-CM

## 2020-02-17 DIAGNOSIS — G47.00 INSOMNIA, UNSPECIFIED TYPE: ICD-10-CM

## 2020-02-17 RX ORDER — IRBESARTAN 300 MG/1
TABLET ORAL
Qty: 30 TABLET | Refills: 2 | Status: SHIPPED | OUTPATIENT
Start: 2020-02-17 | End: 2020-05-18

## 2020-02-17 RX ORDER — MEMANTINE HYDROCHLORIDE 10 MG/1
10 TABLET ORAL 2 TIMES DAILY
Qty: 60 TABLET | Refills: 5 | Status: SHIPPED | OUTPATIENT
Start: 2020-02-17 | End: 2020-08-16

## 2020-02-17 NOTE — TELEPHONE ENCOUNTER
----- Message from Blaise Guerrero MD sent at 2/17/2020  9:14 AM CST -----    02-    [facility]

## 2020-02-19 DIAGNOSIS — G31.84 MILD NEUROCOGNITIVE DISORDER: ICD-10-CM

## 2020-02-19 DIAGNOSIS — G60.8 CSPN (CRYPTOGENIC SENSORY POLYNEUROPATHY): ICD-10-CM

## 2020-02-19 DIAGNOSIS — G57.93 NEUROPATHY OF BOTH FEET: ICD-10-CM

## 2020-02-19 DIAGNOSIS — L82.1 KERATOSIS, SEBORRHEIC: ICD-10-CM

## 2020-02-19 DIAGNOSIS — N18.30 CKD (CHRONIC KIDNEY DISEASE) STAGE 3, GFR 30-59 ML/MIN: Chronic | ICD-10-CM

## 2020-02-19 DIAGNOSIS — E78.5 DYSLIPIDEMIA: Chronic | ICD-10-CM

## 2020-02-19 DIAGNOSIS — R41.3 MEMORY DIFFICULTIES: ICD-10-CM

## 2020-02-19 DIAGNOSIS — R73.03 PRE-DIABETES: Chronic | ICD-10-CM

## 2020-02-19 DIAGNOSIS — M25.511 BILATERAL SHOULDER PAIN, UNSPECIFIED CHRONICITY: ICD-10-CM

## 2020-02-19 DIAGNOSIS — I87.2 VENOUS INSUFFICIENCY: Chronic | ICD-10-CM

## 2020-02-19 DIAGNOSIS — Z86.73 HISTORY OF STROKE: Chronic | ICD-10-CM

## 2020-02-19 DIAGNOSIS — I77.9 BILATERAL CAROTID ARTERY DISEASE, UNSPECIFIED TYPE: Chronic | ICD-10-CM

## 2020-02-19 DIAGNOSIS — I10 HYPERTENSION, UNSPECIFIED TYPE: Chronic | ICD-10-CM

## 2020-02-19 DIAGNOSIS — G47.00 INSOMNIA, UNSPECIFIED TYPE: ICD-10-CM

## 2020-02-19 DIAGNOSIS — M25.512 BILATERAL SHOULDER PAIN, UNSPECIFIED CHRONICITY: ICD-10-CM

## 2020-02-19 DIAGNOSIS — M19.019 ACROMIOCLAVICULAR JOINT ARTHRITIS, UNSPECIFIED LATERALITY: ICD-10-CM

## 2020-02-19 DIAGNOSIS — R29.898 LEFT ARM WEAKNESS: ICD-10-CM

## 2020-02-19 RX ORDER — TRAZODONE HYDROCHLORIDE 50 MG/1
50 TABLET ORAL NIGHTLY
Qty: 90 TABLET | Refills: 3 | Status: SHIPPED | OUTPATIENT
Start: 2020-02-19 | End: 2021-04-07 | Stop reason: SDUPTHER

## 2020-02-24 ENCOUNTER — TELEPHONE (OUTPATIENT)
Dept: FAMILY MEDICINE | Facility: CLINIC | Age: 80
End: 2020-02-24

## 2020-02-24 NOTE — TELEPHONE ENCOUNTER
----- Message from Luis Waterman sent at 2/24/2020  2:47 PM CST -----  Contact: pt daughter  Type:  Test Results    Who Called: pt daughter  Name of Test (Lab/Mammo/Etc): labs  Date of Test:  02/14/2020  Ordering Provider: sharee   Where the test was performed[facility]   Would the patient rather a call back or a response via [facility]? Call   Best Call Back Number:  [phone]   Additional Information:

## 2020-03-26 ENCOUNTER — TELEPHONE (OUTPATIENT)
Dept: FAMILY MEDICINE | Facility: CLINIC | Age: 80
End: 2020-03-26

## 2020-03-26 NOTE — TELEPHONE ENCOUNTER
Spoke with pts daughter, pts daughter states the pt got up to take a walk and states pt is leaning and favoring on side a lot more than the other. Pts daughter concerned that pt had a stroke. Instructed pts daughter to bring pt to the ER to be evaluated. Pts daughter verbalized understanding.

## 2020-04-08 ENCOUNTER — TELEPHONE (OUTPATIENT)
Dept: NEUROLOGY | Facility: CLINIC | Age: 80
End: 2020-04-08

## 2020-04-08 NOTE — TELEPHONE ENCOUNTER
"Called daughter Sussy @ [phone], got fast busy signal x 4.    Called spouse @ [phone], no answer.    Called home# [phone], left voicemail to return my call.    Called work# [phone], "Call cannot be completed at this time."          ----- Message from Blaise Guerrero MD sent at 4/8/2020 10:54 AM CDT -----      The neuropsych testing shows evidence of early dementia.    Plan: continue Aricept and Namenda.     "

## 2020-04-09 ENCOUNTER — TELEPHONE (OUTPATIENT)
Dept: NEUROLOGY | Facility: CLINIC | Age: 80
End: 2020-04-09

## 2020-04-14 ENCOUNTER — TELEPHONE (OUTPATIENT)
Dept: NEUROLOGY | Facility: CLINIC | Age: 80
End: 2020-04-14

## 2020-04-14 NOTE — TELEPHONE ENCOUNTER
Left message for daughter, Sussy, advising her it's best if patient doesn't drive.  Also left contact info for [facility] (ph# [phone], website [email or web address]), because she inquired about resources/info on dementia.

## 2020-04-14 NOTE — TELEPHONE ENCOUNTER
----- Message from Luis Waterman sent at 4/14/2020  8:40 AM CDT -----  Contact: pt daughter  Type:  Patient Returning Call    Who Called: pt daughter  Who Left Message for Patient: nurse  Does the patient know what this is regarding?: memory test  Would the patient rather a call back or a response via [facility]? call  Best Call Back Number: [phone] (home)   Additional Information:

## 2020-04-14 NOTE — TELEPHONE ENCOUNTER
Left voicemail for daughter, Sussy @ [phone] returning her call.        ----- Message from Aubrie Cadet MA sent at 4/13/2020  4:52 PM CDT -----  Contact: Sussy-daughter  Pt returning a call from you/it was her daughter and she wouldn't/didnt know what she was looking for. Was it an appt or results? We can talk about this tomorrow.  Sometimes its just easier to wait and talk to you in case you do know what this is about.  ----- Message -----  From: An Escobar  Sent: 4/13/2020   3:37 PM CDT  To: Cesar Welsh Staff    Type:  Patient Returning Call    Who Called:Sussy-daughter  Who Left Message for Patient:  Does the patient know what this is regarding?:yes  Would the patient rather a call back or a response via [facility]? call  Best Call Back Number:[phone]  Additional Information:

## 2020-04-21 ENCOUNTER — HOSPITAL ENCOUNTER (EMERGENCY)
Facility: HOSPITAL | Age: 80
Discharge: HOME | End: 2020-04-21
Attending: EMERGENCY MEDICINE
Payer: MEDICARE

## 2020-04-21 ENCOUNTER — NURSE TRIAGE (OUTPATIENT)
Dept: ADMINISTRATIVE | Facility: CLINIC | Age: 80
End: 2020-04-21

## 2020-04-21 VITALS
DIASTOLIC BLOOD PRESSURE: 80 MMHG | BODY MASS INDEX: 30.76 KG/M2 | OXYGEN SATURATION: 97 % | HEIGHT: 66 IN | TEMPERATURE: 98 F | WEIGHT: 191.38 LBS | HEART RATE: 66 BPM | SYSTOLIC BLOOD PRESSURE: 162 MMHG | RESPIRATION RATE: 18 BRPM

## 2020-04-21 DIAGNOSIS — I10 CHRONIC HYPERTENSION: ICD-10-CM

## 2020-04-21 DIAGNOSIS — J01.00 ACUTE NON-RECURRENT MAXILLARY SINUSITIS: ICD-10-CM

## 2020-04-21 DIAGNOSIS — R51.9 FRONTAL HEADACHE: ICD-10-CM

## 2020-04-21 DIAGNOSIS — R42 DIZZINESS: ICD-10-CM

## 2020-04-21 DIAGNOSIS — R51.9 SINUS HEADACHE: Primary | ICD-10-CM

## 2020-04-21 LAB
ALBUMIN SERPL BCP-MCNC: 3.8 G/DL (ref 3.5–5.2)
ALP SERPL-CCNC: 96 U/L (ref 55–135)
ALT SERPL W/O P-5'-P-CCNC: 16 U/L (ref 10–44)
ANION GAP SERPL CALC-SCNC: 10 MMOL/L (ref 8–16)
APTT BLDCRRT: 25.4 SEC (ref 21–32)
AST SERPL-CCNC: 25 U/L (ref 10–40)
BASOPHILS # BLD AUTO: 0.05 K/UL (ref 0–0.2)
BASOPHILS NFR BLD: 0.5 % (ref 0–1.9)
BILIRUB SERPL-MCNC: 0.5 MG/DL (ref 0.1–1)
BNP SERPL-MCNC: 159 PG/ML (ref 0–99)
BUN SERPL-MCNC: 18 MG/DL (ref 8–23)
CALCIUM SERPL-MCNC: 9.2 MG/DL (ref 8.7–10.5)
CHLORIDE SERPL-SCNC: 106 MMOL/L (ref 95–110)
CO2 SERPL-SCNC: 26 MMOL/L (ref 23–29)
CREAT SERPL-MCNC: 0.9 MG/DL (ref 0.5–1.4)
DIFFERENTIAL METHOD: NORMAL
EOSINOPHIL # BLD AUTO: 0.2 K/UL (ref 0–0.5)
EOSINOPHIL NFR BLD: 1.8 % (ref 0–8)
ERYTHROCYTE [DISTWIDTH] IN BLOOD BY AUTOMATED COUNT: 13.3 % (ref 11.5–14.5)
EST. GFR  (AFRICAN AMERICAN): >60 ML/MIN/1.73 M^2
EST. GFR  (NON AFRICAN AMERICAN): 60 ML/MIN/1.73 M^2
GLUCOSE SERPL-MCNC: 92 MG/DL (ref 70–110)
HCT VFR BLD AUTO: 42.3 % (ref 37–48.5)
HGB BLD-MCNC: 13.6 G/DL (ref 12–16)
IMM GRANULOCYTES # BLD AUTO: 0.03 K/UL (ref 0–0.04)
IMM GRANULOCYTES NFR BLD AUTO: 0.3 % (ref 0–0.5)
INR PPP: 1 (ref 0.8–1.2)
LYMPHOCYTES # BLD AUTO: 2.7 K/UL (ref 1–4.8)
LYMPHOCYTES NFR BLD: 29.7 % (ref 18–48)
MCH RBC QN AUTO: 30.5 PG (ref 27–31)
MCHC RBC AUTO-ENTMCNC: 32.2 G/DL (ref 32–36)
MCV RBC AUTO: 95 FL (ref 82–98)
MONOCYTES # BLD AUTO: 0.8 K/UL (ref 0.3–1)
MONOCYTES NFR BLD: 9.1 % (ref 4–15)
NEUTROPHILS # BLD AUTO: 5.4 K/UL (ref 1.8–7.7)
NEUTROPHILS NFR BLD: 58.6 % (ref 38–73)
NRBC BLD-RTO: 0 /100 WBC
PLATELET # BLD AUTO: 166 K/UL (ref 150–350)
PMV BLD AUTO: 10 FL (ref 9.2–12.9)
POTASSIUM SERPL-SCNC: 4.1 MMOL/L (ref 3.5–5.1)
PROT SERPL-MCNC: 7.2 G/DL (ref 6–8.4)
PROTHROMBIN TIME: 10.9 SEC (ref 9–12.5)
RBC # BLD AUTO: 4.46 M/UL (ref 4–5.4)
SODIUM SERPL-SCNC: 142 MMOL/L (ref 136–145)
TROPONIN I SERPL DL<=0.01 NG/ML-MCNC: <0.006 NG/ML (ref 0–0.03)
WBC # BLD AUTO: 9.13 K/UL (ref 3.9–12.7)

## 2020-04-21 PROCEDURE — 85610 PROTHROMBIN TIME: CPT | Mod: HCNC

## 2020-04-21 PROCEDURE — 85025 COMPLETE CBC W/AUTO DIFF WBC: CPT | Mod: HCNC

## 2020-04-21 PROCEDURE — 36000 PLACE NEEDLE IN VEIN: CPT | Mod: HCNC

## 2020-04-21 PROCEDURE — 93005 ELECTROCARDIOGRAM TRACING: CPT | Mod: HCNC

## 2020-04-21 PROCEDURE — 84484 ASSAY OF TROPONIN QUANT: CPT | Mod: HCNC

## 2020-04-21 PROCEDURE — 93010 ELECTROCARDIOGRAM REPORT: CPT | Mod: HCNC,,, | Performed by: INTERNAL MEDICINE

## 2020-04-21 PROCEDURE — 93010 EKG 12-LEAD: ICD-10-PCS | Mod: HCNC,,, | Performed by: INTERNAL MEDICINE

## 2020-04-21 PROCEDURE — 80053 COMPREHEN METABOLIC PANEL: CPT | Mod: HCNC

## 2020-04-21 PROCEDURE — 83880 ASSAY OF NATRIURETIC PEPTIDE: CPT | Mod: HCNC

## 2020-04-21 PROCEDURE — 85730 THROMBOPLASTIN TIME PARTIAL: CPT | Mod: HCNC

## 2020-04-21 PROCEDURE — 99285 EMERGENCY DEPT VISIT HI MDM: CPT | Mod: 25,HCNC

## 2020-04-21 RX ORDER — AMOXICILLIN AND CLAVULANATE POTASSIUM 875; 125 MG/1; MG/1
1 TABLET, FILM COATED ORAL 2 TIMES DAILY
Qty: 14 TABLET | Refills: 0 | Status: SHIPPED | OUTPATIENT
Start: 2020-04-21 | End: 2020-08-31

## 2020-04-21 RX ORDER — CETIRIZINE HYDROCHLORIDE, PSEUDOEPHEDRINE HYDROCHLORIDE 5; 120 MG/1; MG/1
1 TABLET, FILM COATED, EXTENDED RELEASE ORAL DAILY
Qty: 30 TABLET | Refills: 0 | Status: SHIPPED | OUTPATIENT
Start: 2020-04-21 | End: 2020-05-01

## 2020-04-21 NOTE — ED NOTES
Patient identifiers verified and correct for Shannon Solano.    LOC: The patient is awake, alert and aware of environment with an appropriate affect, the patient is oriented x 3 and speaking appropriately.  APPEARANCE: Patient resting comfortably and in no acute distress, patient is clean and well groomed, patient's clothing is properly fastened.  SKIN: The skin is warm and dry, color consistent with ethnicity, patient has normal skin turgor and moist mucus membranes, skin intact, no breakdown or bruising noted.  MUSCULOSKELETAL: Patient moving all extremities spontaneously.  RESPIRATORY: Airway is open and patent, respirations are spontaneous.  CARDIAC: Patient has a normal rate, no periphreal edema noted, capillary refill < 3 seconds.  ABDOMEN: Soft and non tender to palpation.  NEURO: WNL ex L frontal HA with dizziness.

## 2020-04-21 NOTE — ED PROVIDER NOTES
4/21/2020, 4:47 PM   History obtained from the patient      History of Present Illness: Shannon Solano is a 80 y.o. female patient who presents to the Emergency Department for dizziness which onset yesterday. Pt states symptoms worsen when she walks.    4:47 PM: Pt was placed back in [facility]. I explained to pt that due to lack of available rooms pt was seen by myself to begin the workup. Pt understands they will be seen and dispositioned by the next available physician. Pt voices understanding and agrees with plan. Pt also understands the longer than normal wait time. I am removing myself from the care of pt and pt will now be assigned to the next available physician.     Melo GUILLEN FNP-C    SCRIBE #1 NOTE: I, Elma Hogue, am scribing for, and in the presence of, Lexii Oliver MD. I have scribed the entire note.       History     Chief Complaint   Patient presents with    Dizziness     Dizziness beginning yesterday, denies ringing in the ears, denies N/V/D, states she has had a headache, denies weakness on one side of the body, denies changes in vision, denies changes in speech     Review of patient's allergies indicates:  No Known Allergies      History of Present Illness     HPI    4/21/2020, 6:06 PM  History obtained from the patient      History of Present Illness: Shannon Solano is a 80 y.o. female patient with a h/o anemia, arthritis, HLD, HTN, osteoarthritis, stroke, who presents to the Emergency Department for evaluation of dizziness which onset yesterday. Pt states she feels dizzy, but is unable to describe her sxs. Symptoms are constant and moderate in severity. No mitigating or exacerbating factors reported. Associated sxs include HA on L frontal top of head. Patient denies any n/v/d, ringing in ears, ear fullness, hearing loss, unilateral weakness or numbness, feeling off balance, changes in vision, changes in speech, and all other sxs at this time. Prior Tx includes Tylenol with mild  relief. No further complaints or concerns at this time.       Arrival mode: Personal transportation    PCP: Leslie Poe MD      Past Medical History:  Past Medical History:   Diagnosis Date    Anemia     Arthritis     Cataract     Disorder of kidney and ureter     Hyperlipidemia     Hypertension     Insomnia 8/28/2019    Osteoarthritis     Polyneuropathy     Stroke        Past Surgical History:  Past Surgical History:   Procedure Laterality Date    APPENDECTOMY      CATARACT EXTRACTION Bilateral 2014    EYE SURGERY Bilateral     HYSTERECTOMY      VARICOSE VEIN SURGERY           Family History:  Family History   Problem Relation Age of Onset    Breast cancer Sister     Heart disease Sister     Colon cancer Sister         age 70    Cancer Sister         breast    Obesity Mother     Stroke Mother     Obesity Brother     Alzheimer's disease Brother     Cancer Brother     No Known Problems Sister     Hypertension Daughter     Melanoma Neg Hx        Social History:   Social History     Tobacco Use    Smoking status: Never Smoker    Smokeless tobacco: Never Used   Substance and Sexual Activity    Alcohol use: No    Drug use: No    Sexual activity: Not Currently        Review of Systems     Review of Systems   Constitutional: Negative for fever.   HENT: Negative for hearing loss and sore throat.         (-) ringing in ears  (-) ear fullness   Eyes:        (-) changes in vision   Respiratory: Negative for shortness of breath.    Cardiovascular: Negative for chest pain.   Gastrointestinal: Negative for diarrhea, nausea and vomiting.   Genitourinary: Negative for dysuria.   Musculoskeletal: Negative for back pain.   Skin: Negative for rash.   Neurological: Positive for dizziness and headaches (L frontal on top of head).        (-) changes in speech  (-) feeling off balance  (-) unilateral numbness or weakness   Hematological: Does not bruise/bleed easily.   All other systems  "reviewed and are negative.       Physical Exam     Initial Vitals [04/21/20 1645]   BP Pulse Resp Temp SpO2   (!) 201/82 62 18 97.5 °F (36.4 °C) 96 %      MAP       --          Physical Exam  Nursing Notes and Vital Signs Reviewed.  Constitutional: Well-developed and well-nourished.   Head: Atraumatic. Normocephalic.  Eyes: EOM intact. No scleral icterus.  ENT: Mucous membranes are moist. Oropharynx is clear and symmetric.    Neck: Supple. Full ROM. No lymphadenopathy.  Cardiovascular: Regular rate. Regular rhythm. No murmurs, rubs, or gallops. Distal pulses are 2+ and symmetric.  Pulmonary/Chest: No respiratory distress. Clear to auscultation bilaterally. No wheezing or rales.  Abdominal: Soft and non-distended.  There is no tenderness.  No rebound, guarding, or rigidity. Good bowel sounds.  Genitourinary: No CVA tenderness  Musculoskeletal: Moves all extremities. No obvious deformities. No calf tenderness.  Skin: Warm and dry.  Neurological:  Alert, awake, and appropriate.  Normal speech.  No acute focal neurological deficits are appreciated. CN 2-12 intact, Normal gait and motor observed.   Psychiatric: Normal affect. Good eye contact. Appropriate in content.     ED Course   Procedures  ED Vital Signs:  Vitals:    04/21/20 1645 04/21/20 1648 04/21/20 1758   BP: (!) 201/82 (!) 179/81 (!) 162/80   Pulse: 62  66   Resp: 18  18   Temp: 97.5 °F (36.4 °C)     TempSrc: Oral     SpO2: 96%  97%   Weight: 86.8 kg (191 lb 5.8 oz)     Height: 5' 6" (1.676 m)         Abnormal Lab Results:  Labs Reviewed   B-TYPE NATRIURETIC PEPTIDE - Abnormal; Notable for the following components:       Result Value     (*)     All other components within normal limits   CBC W/ AUTO DIFFERENTIAL   COMPREHENSIVE METABOLIC PANEL   PROTIME-INR   APTT   TROPONIN I        All Lab Results:  Results for orders placed or performed during the hospital encounter of 04/21/20   CBC auto differential   Result Value Ref Range    WBC 9.13 3.90 - " 12.70 K/uL    RBC 4.46 4.00 - 5.40 M/uL    Hemoglobin 13.6 12.0 - 16.0 g/dL    Hematocrit 42.3 37.0 - 48.5 %    Mean Corpuscular Volume 95 82 - 98 fL    Mean Corpuscular Hemoglobin 30.5 27.0 - 31.0 pg    Mean Corpuscular Hemoglobin Conc 32.2 32.0 - 36.0 g/dL    RDW 13.3 11.5 - 14.5 %    Platelets 166 150 - 350 K/uL    MPV 10.0 9.2 - 12.9 fL    Immature Granulocytes 0.3 0.0 - 0.5 %    Gran # (ANC) 5.4 1.8 - 7.7 K/uL    Immature Grans (Abs) 0.03 0.00 - 0.04 K/uL    Lymph # 2.7 1.0 - 4.8 K/uL    Mono # 0.8 0.3 - 1.0 K/uL    Eos # 0.2 0.0 - 0.5 K/uL    Baso # 0.05 0.00 - 0.20 K/uL    nRBC 0 0 /100 WBC    Gran% 58.6 38.0 - 73.0 %    Lymph% 29.7 18.0 - 48.0 %    Mono% 9.1 4.0 - 15.0 %    Eosinophil% 1.8 0.0 - 8.0 %    Basophil% 0.5 0.0 - 1.9 %    Differential Method Automated    Comprehensive metabolic panel   Result Value Ref Range    Sodium 142 136 - 145 mmol/L    Potassium 4.1 3.5 - 5.1 mmol/L    Chloride 106 95 - 110 mmol/L    CO2 26 23 - 29 mmol/L    Glucose 92 70 - 110 mg/dL    BUN, Bld 18 8 - 23 mg/dL    Creatinine 0.9 0.5 - 1.4 mg/dL    Calcium 9.2 8.7 - 10.5 mg/dL    Total Protein 7.2 6.0 - 8.4 g/dL    Albumin 3.8 3.5 - 5.2 g/dL    Total Bilirubin 0.5 0.1 - 1.0 mg/dL    Alkaline Phosphatase 96 55 - 135 U/L    AST 25 10 - 40 U/L    ALT 16 10 - 44 U/L    Anion Gap 10 8 - 16 mmol/L    eGFR if African American >60 >60 mL/min/1.73 m^2    eGFR if non African American 60 >60 mL/min/1.73 m^2   Protime-INR   Result Value Ref Range    Prothrombin Time 10.9 9.0 - 12.5 sec    INR 1.0 0.8 - 1.2   APTT   Result Value Ref Range    aPTT 25.4 21.0 - 32.0 sec   Troponin I   Result Value Ref Range    Troponin I <0.006 0.000 - 0.026 ng/mL   Brain natriuretic peptide   Result Value Ref Range     (H) 0 - 99 pg/mL         Imaging Results          CT Head Without Contrast (Final result)  Result time 04/21/20 17:14:54    Final result by Pilo Hawley MD (04/21/20 17:14:54)                 Impression:      1.  Negative for acute  intracranial process. Negative for hemorrhage, or skull fracture.    2.  Atrophy, intracranial atherosclerotic disease and small vessel ischemic changes again seen.  Right external capsule lacunar infarcts again seen.    3.  Fluid within the left maxillary sinus, concerning for acute sinusitis.  Clinical correlation is advised.    4.  Paranasal sinus disease in distribution described above.  Other stable findings as noted above.    All CT scans at this facility are performed  using dose modulation techniques as appropriate to performed exam including the following:  automated exposure control; adjustment of mA and/or kV according to the patients size (this includes techniques or standardized protocols for targeted exams where dose is matched to indication/reason for exam: i.e. extremities or head);  iterative reconstruction technique.      Electronically signed by: Pilo Hawley MD  Date:    04/21/2020  Time:    17:14             Narrative:    EXAMINATION:  CT HEAD WITHOUT CONTRAST    CLINICAL HISTORY:  Dizziness;    TECHNIQUE:  Axial images through the brain and posterior fossa were obtained without the use of IV contrast.  Sagittal and coronal reconstructions are provided for review.    COMPARISON:  Studies dating back to February 5, 2016    FINDINGS:  Motion artifact is present on a number of images.  Subtle abnormalities could be overlooked.  The ventricles are midline and the CSF spaces are prominent.  The gray-white matter junction is well preserved. Negative for intracranial vascular abnormalities. Negative for mass, mass effect, cerebral edema, hemorrhage or abnormal fluid collections.  Intracranial atherosclerotic disease.  Small vessel ischemic changes.  Stable right external capsule lacunar infarcts.  Falx calcifications.    The skull and scalp are intact.  Rightward nasal septal deviation.  And debris within the right ear canal.  Left greater than right maxillary sinus mucoperiosteal thickening with a  small bowel a fluid within the low left maxillary sinus.  Patchy ethmoid air cell disease.  Inferior right frontal sinus disease.  The rest of the paranasal sinuses, mastoid air cells, middle ears and ear canals are clear. The globes are intact.  Postoperative changes to the lens regions.                                 The EKG was ordered, reviewed, and independently interpreted by the ED provider.  Interpretation time: 17:22  Rate: 62 BPM  Rhythm: NSR  Interpretation: Normal axis and normal intervals. No STEMI.      The Emergency Provider reviewed the vital signs and test results, which are outlined above.     ED Discussion     Do not feel this is stroke related. Patient ambulating without assistance, no dizziness will tx as sinus infection.     6:32 PM: Reassessed pt at this time.  Pt states her condition has improved at this time. Discussed with pt all pertinent ED information and results. Discussed pt dx and plan of tx for sinus HA and sinusitis. Gave pt all f/u and return to the ED instructions. All questions and concerns were addressed at this time. Pt expresses understanding of information and instructions, and is comfortable with plan to discharge. Pt is stable for discharge.    I discussed with patient and/or family/caretaker that evaluation in the ED does not suggest any emergent or life threatening medical conditions requiring immediate intervention beyond what was provided in the ED, and I believe patient is safe for discharge.  Regardless, an unremarkable evaluation in the ED does not preclude the development or presence of a serious of life threatening condition. As such, patient was instructed to return immediately for any worsening or change in current symptoms.       MDM        Medical Decision Making:   Clinical Tests:   Lab Tests: Ordered and Reviewed  Radiological Study: Ordered and Reviewed  Medical Tests: Ordered and Reviewed           ED Medication(s):  Medications - No data to  display    Discharge Medication List as of 4/21/2020  6:30 PM      START taking these medications    Details   amoxicillin-clavulanate 875-125mg (AUGMENTIN) 875-125 mg per tablet Take 1 tablet by mouth 2 (two) times daily., Starting Tue 4/21/2020, Normal      cetirizine-pseudoephedrine 5-120 mg Tb12 Take 1 tablet by mouth once daily. for 10 days, Starting Tue 4/21/2020, Until Fri 5/1/2020, Normal             Follow-up Information     Leslie Poe MD. Schedule an appointment as soon as possible for a visit in 2 days.    Specialty:  Family Medicine  Why:  Return to the Emergency Room, If symptoms worsen  Contact information:  [address] [place] [id number]  [phone]                       Scribe Attestation:   Scribe #1: I performed the above scribed service and the documentation accurately describes the services I performed. I attest to the accuracy of the note.     Attending:   Physician Attestation Statement for Scribe #1: I, Lexii Oliver MD, personally performed the services described in this documentation, as scribed by Elma Hogue, in my presence, and it is both accurate and complete.           Clinical Impression       ICD-10-CM ICD-9-CM   1. Sinus headache R51 784.0   2. Dizziness R42 780.4   3. Frontal headache R51 784.0   4. Acute non-recurrent maxillary sinusitis J01.00 461.0   5. Chronic hypertension I10 401.9       Disposition:   Disposition: Discharged  Condition: Stable           Lexii Oliver MD  04/21/20 2022

## 2020-04-21 NOTE — TELEPHONE ENCOUNTER
Spoke with patient's daughter Sussy she states that patient's B/P has been running high since yesterday.  B/P today at 2:05 pm was 180/81, HR-61.  Recheck was 188/86, HR 64.  Patient is feeling dizzy.  Denies having any difficulty breathing headache or chest pain.  Sussy states patient vomited x 1 on yesterday.  Sussy wants patient seen in PCP office.  Reached our to Marleny at PCP office states patient can do a virtual visit or go to ER. Sussy states that she cannot do virtual visit.  Advised Sussy to bring patient to ER for further evaluation.  Castro verbalized understanding.  Informed her if patient was to dizzy for her to safely transport then to call 911.    Reason for Disposition   Systolic BP >= 180 OR Diastolic >= 110    Additional Information   Negative: Sounds like a life-threatening emergency to the triager   Negative: Pregnant > 20 weeks and new hand or face swelling   Negative: Pregnant > 20 weeks and BP > 140/90   Negative: Systolic BP >= 160 OR Diastolic >= 100, and any cardiac or neurologic symptoms (e.g., chest pain, difficulty breathing, unsteady gait, blurred vision)   Negative: Patient sounds very sick or weak to the triager   Negative: Systolic BP >= 180 OR Diastolic >= 110, and missed most recent dose of blood pressure medication    Protocols used: HIGH BLOOD PRESSURE-A-[place]

## 2020-04-27 ENCOUNTER — TELEPHONE (OUTPATIENT)
Dept: FAMILY MEDICINE | Facility: CLINIC | Age: 80
End: 2020-04-27

## 2020-04-27 NOTE — TELEPHONE ENCOUNTER
Instructed pts daughter to have pt seek [facility] due to age and symptoms. Pts daughter verbalized understanding and requested appt with Dr. Esparza tomorrow at 9:20 am.

## 2020-04-27 NOTE — TELEPHONE ENCOUNTER
----- Message from Ethan Chao sent at 4/27/2020  2:32 PM CDT -----  Contact: Daughter- Sussy - [phone]  .Name of Caller :Sussy    Reason for Visit/Symptoms:dizzy /vomiting   Best Contact Number or Confirm if Mychart Preferred:[phone]  Preferred Date/Time of Appointment:Today    Interested in Virtual Visit: no  Additional Information      Thank You,  Ethan Chao

## 2020-04-28 ENCOUNTER — TELEPHONE (OUTPATIENT)
Dept: FAMILY MEDICINE | Facility: CLINIC | Age: 80
End: 2020-04-28

## 2020-04-28 ENCOUNTER — TELEPHONE (OUTPATIENT)
Dept: CARDIOLOGY | Facility: CLINIC | Age: 80
End: 2020-04-28

## 2020-04-28 ENCOUNTER — OFFICE VISIT (OUTPATIENT)
Dept: FAMILY MEDICINE | Facility: CLINIC | Age: 80
End: 2020-04-28
Attending: FAMILY MEDICINE
Payer: MEDICARE

## 2020-04-28 ENCOUNTER — OFFICE VISIT (OUTPATIENT)
Dept: CARDIOLOGY | Facility: CLINIC | Age: 80
End: 2020-04-28
Payer: MEDICARE

## 2020-04-28 VITALS
HEIGHT: 66 IN | WEIGHT: 156.5 LBS | HEART RATE: 88 BPM | BODY MASS INDEX: 25.15 KG/M2 | SYSTOLIC BLOOD PRESSURE: 85 MMHG | DIASTOLIC BLOOD PRESSURE: 59 MMHG | TEMPERATURE: 97 F | OXYGEN SATURATION: 96 %

## 2020-04-28 DIAGNOSIS — R42 DIZZINESS: ICD-10-CM

## 2020-04-28 DIAGNOSIS — E78.5 DYSLIPIDEMIA: Chronic | ICD-10-CM

## 2020-04-28 DIAGNOSIS — I10 ESSENTIAL HYPERTENSION: Chronic | ICD-10-CM

## 2020-04-28 DIAGNOSIS — R42 DIZZINESS: Primary | ICD-10-CM

## 2020-04-28 DIAGNOSIS — R29.6 FALLS FREQUENTLY: Primary | ICD-10-CM

## 2020-04-28 DIAGNOSIS — I95.1 ORTHOSTATIC HYPOTENSION: Primary | ICD-10-CM

## 2020-04-28 DIAGNOSIS — G31.84 MILD NEUROCOGNITIVE DISORDER: ICD-10-CM

## 2020-04-28 DIAGNOSIS — F09 MILD COGNITIVE DISORDER: ICD-10-CM

## 2020-04-28 DIAGNOSIS — I65.23 BILATERAL CAROTID ARTERY STENOSIS: Chronic | ICD-10-CM

## 2020-04-28 DIAGNOSIS — I34.0 NONRHEUMATIC MITRAL VALVE REGURGITATION: ICD-10-CM

## 2020-04-28 DIAGNOSIS — G62.9 POLYNEUROPATHY: ICD-10-CM

## 2020-04-28 DIAGNOSIS — Z86.73 HISTORY OF CVA IN ADULTHOOD: ICD-10-CM

## 2020-04-28 PROCEDURE — 1100F PTFALLS ASSESS-DOCD GE2>/YR: CPT | Mod: 95,HCNC,CPTII, | Performed by: INTERNAL MEDICINE

## 2020-04-28 PROCEDURE — 1100F PR PT FALLS ASSESS DOC 2+ FALLS/FALL W/INJURY/YR: ICD-10-PCS | Mod: HCNC,CPTII,S$GLB, | Performed by: FAMILY MEDICINE

## 2020-04-28 PROCEDURE — 1159F MED LIST DOCD IN RCRD: CPT | Mod: HCNC,S$GLB,, | Performed by: FAMILY MEDICINE

## 2020-04-28 PROCEDURE — 3074F PR MOST RECENT SYSTOLIC BLOOD PRESSURE < 130 MM HG: ICD-10-PCS | Mod: 95,HCNC,CPTII, | Performed by: INTERNAL MEDICINE

## 2020-04-28 PROCEDURE — 93005 ELECTROCARDIOGRAM TRACING: CPT | Mod: HCNC,S$GLB,, | Performed by: FAMILY MEDICINE

## 2020-04-28 PROCEDURE — 3288F FALL RISK ASSESSMENT DOCD: CPT | Mod: 95,HCNC,CPTII, | Performed by: INTERNAL MEDICINE

## 2020-04-28 PROCEDURE — 3074F SYST BP LT 130 MM HG: CPT | Mod: 95,HCNC,CPTII, | Performed by: INTERNAL MEDICINE

## 2020-04-28 PROCEDURE — 99214 OFFICE O/P EST MOD 30 MIN: CPT | Mod: HCNC,S$GLB,, | Performed by: FAMILY MEDICINE

## 2020-04-28 PROCEDURE — 99999 PR PBB SHADOW E&M-EST. PATIENT-LVL IV: CPT | Mod: PBBFAC,HCNC,, | Performed by: FAMILY MEDICINE

## 2020-04-28 PROCEDURE — 99441 PR PHYSICIAN TELEPHONE EVALUATION 5-10 MIN: ICD-10-PCS | Mod: 95,HCNC,, | Performed by: INTERNAL MEDICINE

## 2020-04-28 PROCEDURE — 3074F PR MOST RECENT SYSTOLIC BLOOD PRESSURE < 130 MM HG: ICD-10-PCS | Mod: HCNC,CPTII,S$GLB, | Performed by: FAMILY MEDICINE

## 2020-04-28 PROCEDURE — 3078F PR MOST RECENT DIASTOLIC BLOOD PRESSURE < 80 MM HG: ICD-10-PCS | Mod: 95,HCNC,CPTII, | Performed by: INTERNAL MEDICINE

## 2020-04-28 PROCEDURE — 3074F SYST BP LT 130 MM HG: CPT | Mod: HCNC,CPTII,S$GLB, | Performed by: FAMILY MEDICINE

## 2020-04-28 PROCEDURE — 3288F FALL RISK ASSESSMENT DOCD: CPT | Mod: HCNC,CPTII,S$GLB, | Performed by: FAMILY MEDICINE

## 2020-04-28 PROCEDURE — 1100F PTFALLS ASSESS-DOCD GE2>/YR: CPT | Mod: HCNC,CPTII,S$GLB, | Performed by: FAMILY MEDICINE

## 2020-04-28 PROCEDURE — 1100F PR PT FALLS ASSESS DOC 2+ FALLS/FALL W/INJURY/YR: ICD-10-PCS | Mod: 95,HCNC,CPTII, | Performed by: INTERNAL MEDICINE

## 2020-04-28 PROCEDURE — 93010 ELECTROCARDIOGRAM REPORT: CPT | Mod: HCNC,S$GLB,, | Performed by: INTERNAL MEDICINE

## 2020-04-28 PROCEDURE — 1126F AMNT PAIN NOTED NONE PRSNT: CPT | Mod: HCNC,S$GLB,, | Performed by: FAMILY MEDICINE

## 2020-04-28 PROCEDURE — 93010 EKG 12-LEAD: ICD-10-PCS | Mod: HCNC,S$GLB,, | Performed by: INTERNAL MEDICINE

## 2020-04-28 PROCEDURE — 99214 PR OFFICE/OUTPT VISIT, EST, LEVL IV, 30-39 MIN: ICD-10-PCS | Mod: HCNC,S$GLB,, | Performed by: FAMILY MEDICINE

## 2020-04-28 PROCEDURE — 1159F MED LIST DOCD IN RCRD: CPT | Mod: 95,HCNC,, | Performed by: INTERNAL MEDICINE

## 2020-04-28 PROCEDURE — 3078F PR MOST RECENT DIASTOLIC BLOOD PRESSURE < 80 MM HG: ICD-10-PCS | Mod: HCNC,CPTII,S$GLB, | Performed by: FAMILY MEDICINE

## 2020-04-28 PROCEDURE — 99441 PR PHYSICIAN TELEPHONE EVALUATION 5-10 MIN: CPT | Mod: 95,HCNC,, | Performed by: INTERNAL MEDICINE

## 2020-04-28 PROCEDURE — 3078F DIAST BP <80 MM HG: CPT | Mod: HCNC,CPTII,S$GLB, | Performed by: FAMILY MEDICINE

## 2020-04-28 PROCEDURE — 3078F DIAST BP <80 MM HG: CPT | Mod: 95,HCNC,CPTII, | Performed by: INTERNAL MEDICINE

## 2020-04-28 PROCEDURE — 1159F PR MEDICATION LIST DOCUMENTED IN MEDICAL RECORD: ICD-10-PCS | Mod: 95,HCNC,, | Performed by: INTERNAL MEDICINE

## 2020-04-28 PROCEDURE — 3288F PR FALLS RISK ASSESSMENT DOCUMENTED: ICD-10-PCS | Mod: HCNC,CPTII,S$GLB, | Performed by: FAMILY MEDICINE

## 2020-04-28 PROCEDURE — 1126F PR PAIN SEVERITY QUANTIFIED, NO PAIN PRESENT: ICD-10-PCS | Mod: HCNC,S$GLB,, | Performed by: FAMILY MEDICINE

## 2020-04-28 PROCEDURE — 3288F PR FALLS RISK ASSESSMENT DOCUMENTED: ICD-10-PCS | Mod: 95,HCNC,CPTII, | Performed by: INTERNAL MEDICINE

## 2020-04-28 PROCEDURE — 99999 PR PBB SHADOW E&M-EST. PATIENT-LVL IV: ICD-10-PCS | Mod: PBBFAC,HCNC,, | Performed by: FAMILY MEDICINE

## 2020-04-28 PROCEDURE — 93005 EKG 12-LEAD: ICD-10-PCS | Mod: HCNC,S$GLB,, | Performed by: FAMILY MEDICINE

## 2020-04-28 PROCEDURE — 1159F PR MEDICATION LIST DOCUMENTED IN MEDICAL RECORD: ICD-10-PCS | Mod: HCNC,S$GLB,, | Performed by: FAMILY MEDICINE

## 2020-04-28 RX ORDER — MIDODRINE HYDROCHLORIDE 2.5 MG/1
5 TABLET ORAL 2 TIMES DAILY WITH MEALS
Qty: 120 TABLET | Refills: 11 | Status: SHIPPED | OUTPATIENT
Start: 2020-04-28 | End: 2020-08-10

## 2020-04-28 RX ORDER — ONDANSETRON 4 MG/1
4 TABLET, ORALLY DISINTEGRATING ORAL EVERY 6 HOURS PRN
Qty: 20 TABLET | Refills: 0 | Status: SHIPPED | OUTPATIENT
Start: 2020-04-28 | End: 2020-08-31

## 2020-04-28 RX ORDER — GABAPENTIN 100 MG/1
CAPSULE ORAL
Qty: 30 CAPSULE | Refills: 0 | Status: SHIPPED | OUTPATIENT
Start: 2020-04-28 | End: 2020-04-29

## 2020-04-28 NOTE — PROGRESS NOTES
Subjective:       Patient ID: Shannon Solano is a 80 y.o. female.    Chief Complaint: Dizziness and Nausea (vomiting)    80 y old female with hx of CVA, polyneuropathy who after standing  up from  Chair in kitchen  could not move legs to start stride  and fell . No injuries,  no loc . Also  With brief episodes of dizziness when she stands up . No hearing loss, headaches, confusion, syncope . She also had ER visist on 4/21 due to dizziness. CT head demonstrated L  maxillary sinusitis . Started on Augmentin     Review of Systems   Constitutional: Negative.    HENT: Negative.    Eyes: Negative.    Respiratory: Negative.    Cardiovascular: Negative.    Gastrointestinal: Negative.    Genitourinary: Negative.    Musculoskeletal: Negative.    Skin: Negative.    Neurological: Positive for dizziness.   Hematological: Negative.        Objective:      Physical Exam   Constitutional: She is oriented to person, place, and time. No distress.   HENT:   Head: Atraumatic. Macrocephalic.   Right Ear: External ear normal.   Left Ear: External ear normal.   Mouth/Throat: No oropharyngeal exudate.   Eyes: Pupils are equal, round, and reactive to light. Conjunctivae and EOM are normal. Right eye exhibits no discharge. Left eye exhibits no discharge. No scleral icterus.   Neck: Normal range of motion. Neck supple. No JVD present. No tracheal deviation present. No thyromegaly present.   Cardiovascular: Normal rate, regular rhythm and normal heart sounds. Exam reveals no gallop and no friction rub.   No murmur heard.  Pulmonary/Chest: Effort normal and breath sounds normal. No stridor. No respiratory distress. She has no wheezes. She has no rales. She exhibits no tenderness.   Abdominal: Soft. Bowel sounds are normal. She exhibits no distension. There is no tenderness. There is no rebound and no guarding.   Musculoskeletal: Normal range of motion.   Lymphadenopathy:     She has no cervical adenopathy.   Neurological: She is alert and  oriented to person, place, and time. She has normal strength. No cranial nerve deficit or sensory deficit. Coordination abnormal. GCS eye subscore is 4. GCS verbal subscore is 5. GCS motor subscore is 6.   Reflex Scores:       Tricep reflexes are 2+ on the right side and 2+ on the left side.       Bicep reflexes are 2+ on the right side and 2+ on the left side.       Brachioradialis reflexes are 2+ on the right side and 2+ on the left side.       Patellar reflexes are 2+ on the right side and 2+ on the left side.       Achilles reflexes are 2+ on the right side and 2+ on the left side.  Skin: Skin is warm and dry. She is not diaphoretic.   Psychiatric: She has a normal mood and affect. Her behavior is normal. Judgment and thought content normal.       Assessment:       Shannon was seen today for dizziness and nausea.    Diagnoses and all orders for this visit:    Dizziness  -     IN OFFICE EKG 12-LEAD (to [facility])    Mild neurocognitive disorder    History of CVA in adulthood    Polyneuropathy    Other orders  -     gabapentin (NEURONTIN) 100 MG capsule; 1 cap po qhs for a total of 400 mg qhs  -     ondansetron (ZOFRAN-ODT) 4 MG TbDL; Take 1 tablet (4 mg total) by mouth every 6 (six) hours as needed.      Plan:     Shannon was seen today for dizziness and nausea.    Diagnoses and all orders for this visit:    Dizziness  -     IN OFFICE EKG 12-LEAD (to [facility])     + [facility]ostatics . Will have her f.u with card  Con Namenda   WS discussed  Gabapentin

## 2020-04-28 NOTE — PROGRESS NOTES
Subjective:   Patient ID:  Shannon Solano is a 80 y.o. female who presents for follow up of No chief complaint on file.      79 yo female, orthostatic hypotension f/u  PMH HTN carotid A Dz, HTN, h/o CVA 3 yrs ago, CKD.  EKG NSR and non specfic stt change  Echo in 2018 normal EF and moderate MR  04/21/2020 ER visit for dizziness> SBP at 208 mmHG and positive orthostatic hypotension, dropped to 170 mmHG  Today at PCP's office, still positive orthostatic   No diarrhea.   No chest pain, leg swelling, orthopnea.  No dehydration issue  Lives with her  and poor social support.  Today also discussed with her normanter at audio visit                Past Medical History:   Diagnosis Date    Anemia     Arthritis     Cataract     Disorder of kidney and ureter     Hyperlipidemia     Hypertension     Insomnia 8/28/2019    Osteoarthritis     Polyneuropathy     Stroke        Past Surgical History:   Procedure Laterality Date    APPENDECTOMY      CATARACT EXTRACTION Bilateral 2014    EYE SURGERY Bilateral     HYSTERECTOMY      VARICOSE VEIN SURGERY         Social History     Tobacco Use    Smoking status: Never Smoker    Smokeless tobacco: Never Used   Substance Use Topics    Alcohol use: No    Drug use: No       Family History   Problem Relation Age of Onset    Breast cancer Sister     Heart disease Sister     Colon cancer Sister         age 70    Cancer Sister         breast    Obesity Mother     Stroke Mother     Obesity Brother     Alzheimer's disease Brother     Cancer Brother     No Known Problems Sister     Hypertension Daughter     Melanoma Neg Hx          Review of Systems   Constitution: Negative for decreased appetite, diaphoresis, fever, malaise/fatigue and night sweats.   HENT: Negative for nosebleeds.    Eyes: Negative for blurred vision and double vision.   Cardiovascular: Positive for near-syncope. Negative for chest pain, claudication, dyspnea on exertion, irregular heartbeat,  leg swelling, orthopnea, palpitations, paroxysmal nocturnal dyspnea and syncope.   Respiratory: Negative for cough, shortness of breath, sleep disturbances due to breathing, snoring, sputum production and wheezing.    Endocrine: Negative for cold intolerance and polyuria.   Hematologic/Lymphatic: Does not bruise/bleed easily.   Skin: Negative for rash.   Musculoskeletal: Negative for back pain, falls, joint pain, joint swelling and neck pain.   Gastrointestinal: Negative for abdominal pain, heartburn, nausea and vomiting.   Genitourinary: Negative for dysuria, frequency and hematuria.   Neurological: Positive for dizziness and light-headedness. Negative for difficulty with concentration, focal weakness, headaches, numbness, seizures and weakness.   Psychiatric/Behavioral: Positive for memory loss. Negative for depression and substance abuse. The patient does not have insomnia.    Allergic/Immunologic: Negative for HIV exposure and hives.       Objective:   Physical Exam    Lab Results   Component Value Date    CHOL 207 (H) 02/14/2020    CHOL 185 09/12/2019    CHOL 178 10/09/2017     Lab Results   Component Value Date    HDL 48 02/14/2020    HDL 38 (L) 09/12/2019    HDL 42 10/09/2017     Lab Results   Component Value Date    LDLCALC 139.6 02/14/2020    LDLCALC 110.8 09/12/2019    LDLCALC 103.2 10/09/2017     Lab Results   Component Value Date    TRIG 97 02/14/2020    TRIG 181 (H) 09/12/2019    TRIG 164 (H) 10/09/2017     Lab Results   Component Value Date    CHOLHDL 23.2 02/14/2020    CHOLHDL 20.5 09/12/2019    CHOLHDL 23.6 10/09/2017       Chemistry        Component Value Date/Time     04/21/2020 1722    K 4.1 04/21/2020 1722     04/21/2020 1722    CO2 26 04/21/2020 1722    BUN 18 04/21/2020 1722    CREATININE 0.9 04/21/2020 1722    GLU 92 04/21/2020 1722        Component Value Date/Time    CALCIUM 9.2 04/21/2020 1722    ALKPHOS 96 04/21/2020 1722    AST 25 04/21/2020 1722    ALT 16 04/21/2020 1722     BILITOT 0.5 04/21/2020 1722    ESTGFRAFRICA >60 04/21/2020 1722    EGFRNONAA 60 04/21/2020 1722          Lab Results   Component Value Date    HGBA1C 5.7 (H) 02/14/2020     Lab Results   Component Value Date    TSH 1.374 09/12/2019     Lab Results   Component Value Date    INR 1.0 04/21/2020    INR 1.0 09/13/2019    INR 1.0 09/12/2019     Lab Results   Component Value Date    WBC 9.13 04/21/2020    HGB 13.6 04/21/2020    HCT 42.3 04/21/2020    MCV 95 04/21/2020     04/21/2020     BMP  Sodium   Date Value Ref Range Status   04/21/2020 142 136 - 145 mmol/L Final     Potassium   Date Value Ref Range Status   04/21/2020 4.1 3.5 - 5.1 mmol/L Final     Chloride   Date Value Ref Range Status   04/21/2020 106 95 - 110 mmol/L Final     CO2   Date Value Ref Range Status   04/21/2020 26 23 - 29 mmol/L Final     BUN, Bld   Date Value Ref Range Status   04/21/2020 18 8 - 23 mg/dL Final     Creatinine   Date Value Ref Range Status   04/21/2020 0.9 0.5 - 1.4 mg/dL Final     Calcium   Date Value Ref Range Status   04/21/2020 9.2 8.7 - 10.5 mg/dL Final     Anion Gap   Date Value Ref Range Status   04/21/2020 10 8 - 16 mmol/L Final     eGFR if    Date Value Ref Range Status   04/21/2020 >60 >60 mL/min/1.73 m^2 Final     eGFR if non    Date Value Ref Range Status   04/21/2020 60 >60 mL/min/1.73 m^2 Final     Comment:     Calculation used to obtain the estimated glomerular filtration  rate (eGFR) is the CKD-EPI equation.        BNP  @LABRCNTIP(BNP,BNPTRIAGEBLO)@  @LABRCNTIP(troponini)@  CrCl cannot be calculated (Unknown ideal weight.).  No results found in the last 24 hours.  No results found in the last 24 hours.  No results found in the last 24 hours.    Assessment:      1. Orthostatic hypotension    2. Essential hypertension    3. Dyslipidemia    4. Bilateral carotid artery stenosis    5. Nonrheumatic mitral valve regurgitation        Plan:   Add Midodrine 5mg bid discussed with her daughter  no midodrine 4 hours before the dedtime.  Continue Irbesartan 300 mg daily for HTN  Advise pressure stocking  Echo for MR  Carotid US for dizziness  Poor social support and may consider long term care facilitate  Continue ASA and statin.  RTC audio call in one week            Established Patient - Audio Only Telehealth Visit     The patient location is: home due to COVID 19 pandemic  The chief complaint leading to consultation is: dizziness   Visit type: Virtual visit with audio only (telephone)     The reason for the audio only service rather than synchronous audio and video virtual visit was related to technical difficulties or patient preference/necessity.     Each patient to whom I provide medical services by telemedicine is:  (1) informed of the relationship between the physician and patient and the respective role of any other health care provider with respect to management of the patient; and (2) notified that they may decline to receive medical services by telemedicine and may withdraw from such care at any time. Patient verbally consented to receive this service via voice-only telephone call.         This service was not originating from a related E/M service provided within the previous 7 days nor will  to an E/M service or procedure within the next 24 hours or my soonest available appointment.  Prevailing standard of care was able to be met in this audio-only visit.

## 2020-04-28 NOTE — TELEPHONE ENCOUNTER
Informed pts daughter of pts BP dropping when standing and needing to f/u with Cardiology per Dr. Esparza. Please contact pt to schedule.

## 2020-04-28 NOTE — TELEPHONE ENCOUNTER
----- Message from Viviane Leroy sent at 4/28/2020  1:30 PM CDT -----  Contact: pt daughter   .Type:  Patient Returning Call    Who Called: pt daughter    Who Left Message for Patient:   Does the patient know what this is regarding?: test results   Would the patient rather a call back or a response via [facility]? Call back   Best Call Back Number:  [phone]  Additional Information:

## 2020-04-29 ENCOUNTER — TELEPHONE (OUTPATIENT)
Dept: FAMILY MEDICINE | Facility: CLINIC | Age: 80
End: 2020-04-29

## 2020-04-29 RX ORDER — GABAPENTIN 400 MG/1
400 CAPSULE ORAL NIGHTLY
Qty: 30 CAPSULE | Refills: 0 | Status: SHIPPED | OUTPATIENT
Start: 2020-04-29 | End: 2020-05-22

## 2020-04-29 NOTE — TELEPHONE ENCOUNTER
Left pts daughter a message to call the office back. Need to know which H.H. They would like and also need to  pts handicap tag.

## 2020-04-29 NOTE — TELEPHONE ENCOUNTER
Pt daughter came to office today in regards to her mother. Pharmacy is telling the daughter that they have gabapentin 400 mg in stock. The daughter is requesting to see if you can change it to the 400 mg a day instead of her having to take 100 and 300 mg to equal the 400 mg. Pt daughter also wants to get a home health order and a handicap tag for her mother. Please call pt daughter with any other questions. The daughter name is Sussy and her number is [phone]

## 2020-04-29 NOTE — TELEPHONE ENCOUNTER
Pt is wanting to take one capsule of Gabapentin that is 400 mg instead of multiple pills to equal 400 mg. Please advise. Pharmacy calling.   <<-----Click here for Discharge Medication Review

## 2020-04-29 NOTE — TELEPHONE ENCOUNTER
----- Message from Billy Mcneal sent at 4/29/2020 11:11 AM CDT -----  Contact: [facility]   Type:  Pharmacy Calling to Clarify an RX    Name of Caller:jesus  [facility]  Prescription Name:gabapentin   What do they need to clarify?:pt is wanting to get 1 400 mg caps instead of taking mult pills to equal 400 mg caps  Best Call Back Number:[phone]  Additional Information:

## 2020-04-29 NOTE — TELEPHONE ENCOUNTER
Gabapentin 400 mg was sent earlier, h.h orders were also printed yesterday . Ok to give handicap  Form

## 2020-04-30 ENCOUNTER — TELEPHONE (OUTPATIENT)
Dept: CARDIOLOGY | Facility: CLINIC | Age: 80
End: 2020-04-30

## 2020-04-30 ENCOUNTER — TELEPHONE (OUTPATIENT)
Dept: FAMILY MEDICINE | Facility: CLINIC | Age: 80
End: 2020-04-30

## 2020-04-30 NOTE — TELEPHONE ENCOUNTER
Spoke with pt's daughter, informed her that handicap tag is at the front for . She verbalized understanding. She also asked for Gabapentin 400 mg sent to pharmacy. Informed her that it was sent yesterday. She verbalized understanding.

## 2020-04-30 NOTE — TELEPHONE ENCOUNTER
----- Message from Annie Sanon sent at 4/30/2020  2:03 PM CDT -----  Contact: pt   Pt needing a call back       Pt contact #[phone]

## 2020-04-30 NOTE — TELEPHONE ENCOUNTER
----- Message from Thai Breen sent at 4/30/2020 12:09 PM CDT -----  Contact: Daughter -[phone]  Pt's daughter would like return call regarding question about pt's prescription medication. Please call back at [phone]. Jacob Hernandez

## 2020-04-30 NOTE — TELEPHONE ENCOUNTER
----- Message from Viviane Leroy sent at 4/30/2020  2:16 PM CDT -----  Contact: pt  Pt request call back to verify if needing to take the high b/p pill with the low b/p pill EKG is abnormal ...  Call back: [phone]

## 2020-04-30 NOTE — TELEPHONE ENCOUNTER
Patient contacted and schedule a sooner appt.    ----- Message from Naa Gray sent at 4/30/2020  4:24 PM CDT -----  Jennifer Parker Requesting a call back regarding low blood preserved midodriune 2.5mg high blood pressure  irbesatan 300mg . EKG Adnormal . Please call pt back at [phone]

## 2020-05-01 ENCOUNTER — OFFICE VISIT (OUTPATIENT)
Dept: CARDIOLOGY | Facility: CLINIC | Age: 80
End: 2020-05-01
Payer: MEDICARE

## 2020-05-01 DIAGNOSIS — E78.5 DYSLIPIDEMIA: Chronic | ICD-10-CM

## 2020-05-01 DIAGNOSIS — I10 ESSENTIAL HYPERTENSION: Chronic | ICD-10-CM

## 2020-05-01 DIAGNOSIS — N18.30 CKD (CHRONIC KIDNEY DISEASE) STAGE 3, GFR 30-59 ML/MIN: Chronic | ICD-10-CM

## 2020-05-01 DIAGNOSIS — I95.1 ORTHOSTATIC HYPOTENSION: Primary | ICD-10-CM

## 2020-05-01 DIAGNOSIS — I65.23 BILATERAL CAROTID ARTERY STENOSIS: Chronic | ICD-10-CM

## 2020-05-01 DIAGNOSIS — G31.84 MILD NEUROCOGNITIVE DISORDER: ICD-10-CM

## 2020-05-01 PROCEDURE — 1159F PR MEDICATION LIST DOCUMENTED IN MEDICAL RECORD: ICD-10-PCS | Mod: HCNC,,, | Performed by: INTERNAL MEDICINE

## 2020-05-01 PROCEDURE — 1159F MED LIST DOCD IN RCRD: CPT | Mod: HCNC,,, | Performed by: INTERNAL MEDICINE

## 2020-05-01 PROCEDURE — 99499 NO LOS: ICD-10-PCS | Mod: HCNC,,, | Performed by: INTERNAL MEDICINE

## 2020-05-01 PROCEDURE — 1100F PTFALLS ASSESS-DOCD GE2>/YR: CPT | Mod: HCNC,CPTII,, | Performed by: INTERNAL MEDICINE

## 2020-05-01 PROCEDURE — 3288F PR FALLS RISK ASSESSMENT DOCUMENTED: ICD-10-PCS | Mod: HCNC,CPTII,, | Performed by: INTERNAL MEDICINE

## 2020-05-01 PROCEDURE — 3288F FALL RISK ASSESSMENT DOCD: CPT | Mod: HCNC,CPTII,, | Performed by: INTERNAL MEDICINE

## 2020-05-01 PROCEDURE — 99499 UNLISTED E&M SERVICE: CPT | Mod: HCNC,,, | Performed by: INTERNAL MEDICINE

## 2020-05-01 PROCEDURE — 1100F PR PT FALLS ASSESS DOC 2+ FALLS/FALL W/INJURY/YR: ICD-10-PCS | Mod: HCNC,CPTII,, | Performed by: INTERNAL MEDICINE

## 2020-05-01 NOTE — PROGRESS NOTES
Subjective:   Patient ID:  Shannon Solano is a 80 y.o. female who presents for follow up of No chief complaint on file.      81 yo female, orthostatic hypotension f/u  PMH HTN carotid A Dz, HTN, h/o CVA 3 yrs ago, CKD.  EKG NSR and non specfic stt change  Echo in 2018 normal EF and moderate MR  04/21/2020 ER visit for dizziness> SBP at 208 mmHG and positive orthostatic hypotension, dropped to 170 mmHG  Today at PCP's office, still positive orthostatic   No diarrhea.   No chest pain, leg swelling, orthopnea.  No dehydration issue  Lives with her  and poor social support.    Today, feels better and functioning well.  C/o sharp pain on feet and ankles occurred at night, no worse with walking. The pain is chronic and stable  B/p readings  153/73 and 148/72 per her daughter                Past Medical History:   Diagnosis Date    Anemia     Arthritis     Cataract     Disorder of kidney and ureter     Hyperlipidemia     Hypertension     Insomnia 8/28/2019    Osteoarthritis     Polyneuropathy     Stroke        Past Surgical History:   Procedure Laterality Date    APPENDECTOMY      CATARACT EXTRACTION Bilateral 2014    EYE SURGERY Bilateral     HYSTERECTOMY      VARICOSE VEIN SURGERY         Social History     Tobacco Use    Smoking status: Never Smoker    Smokeless tobacco: Never Used   Substance Use Topics    Alcohol use: No    Drug use: No       Family History   Problem Relation Age of Onset    Breast cancer Sister     Heart disease Sister     Colon cancer Sister         age 70    Cancer Sister         breast    Obesity Mother     Stroke Mother     Obesity Brother     Alzheimer's disease Brother     Cancer Brother     No Known Problems Sister     Hypertension Daughter     Melanoma Neg Hx          Review of Systems   Constitution: Negative for decreased appetite, diaphoresis, fever, malaise/fatigue and night sweats.   HENT: Negative for nosebleeds.    Eyes: Negative for blurred vision  and double vision.   Cardiovascular: Positive for near-syncope. Negative for chest pain, claudication, dyspnea on exertion, irregular heartbeat, leg swelling, orthopnea, palpitations, paroxysmal nocturnal dyspnea and syncope.   Respiratory: Negative for cough, shortness of breath, sleep disturbances due to breathing, snoring, sputum production and wheezing.    Endocrine: Negative for cold intolerance and polyuria.   Hematologic/Lymphatic: Does not bruise/bleed easily.   Skin: Negative for rash.   Musculoskeletal: Negative for back pain, falls, joint pain, joint swelling and neck pain.   Gastrointestinal: Negative for abdominal pain, heartburn, nausea and vomiting.   Genitourinary: Negative for dysuria, frequency and hematuria.   Neurological: Positive for dizziness and light-headedness. Negative for difficulty with concentration, focal weakness, headaches, numbness, seizures and weakness.   Psychiatric/Behavioral: Positive for memory loss. Negative for depression and substance abuse. The patient does not have insomnia.    Allergic/Immunologic: Negative for HIV exposure and hives.       Objective:   Physical Exam    Lab Results   Component Value Date    CHOL 207 (H) 02/14/2020    CHOL 185 09/12/2019    CHOL 178 10/09/2017     Lab Results   Component Value Date    HDL 48 02/14/2020    HDL 38 (L) 09/12/2019    HDL 42 10/09/2017     Lab Results   Component Value Date    LDLCALC 139.6 02/14/2020    LDLCALC 110.8 09/12/2019    LDLCALC 103.2 10/09/2017     Lab Results   Component Value Date    TRIG 97 02/14/2020    TRIG 181 (H) 09/12/2019    TRIG 164 (H) 10/09/2017     Lab Results   Component Value Date    CHOLHDL 23.2 02/14/2020    CHOLHDL 20.5 09/12/2019    CHOLHDL 23.6 10/09/2017       Chemistry        Component Value Date/Time     04/21/2020 1722    K 4.1 04/21/2020 1722     04/21/2020 1722    CO2 26 04/21/2020 1722    BUN 18 04/21/2020 1722    CREATININE 0.9 04/21/2020 1722    GLU 92 04/21/2020 1722         Component Value Date/Time    CALCIUM 9.2 04/21/2020 1722    ALKPHOS 96 04/21/2020 1722    AST 25 04/21/2020 1722    ALT 16 04/21/2020 1722    BILITOT 0.5 04/21/2020 1722    ESTGFRAFRICA >60 04/21/2020 1722    EGFRNONAA 60 04/21/2020 1722          Lab Results   Component Value Date    HGBA1C 5.7 (H) 02/14/2020     Lab Results   Component Value Date    TSH 1.374 09/12/2019     Lab Results   Component Value Date    INR 1.0 04/21/2020    INR 1.0 09/13/2019    INR 1.0 09/12/2019     Lab Results   Component Value Date    WBC 9.13 04/21/2020    HGB 13.6 04/21/2020    HCT 42.3 04/21/2020    MCV 95 04/21/2020     04/21/2020     BMP  Sodium   Date Value Ref Range Status   04/21/2020 142 136 - 145 mmol/L Final     Potassium   Date Value Ref Range Status   04/21/2020 4.1 3.5 - 5.1 mmol/L Final     Chloride   Date Value Ref Range Status   04/21/2020 106 95 - 110 mmol/L Final     CO2   Date Value Ref Range Status   04/21/2020 26 23 - 29 mmol/L Final     BUN, Bld   Date Value Ref Range Status   04/21/2020 18 8 - 23 mg/dL Final     Creatinine   Date Value Ref Range Status   04/21/2020 0.9 0.5 - 1.4 mg/dL Final     Calcium   Date Value Ref Range Status   04/21/2020 9.2 8.7 - 10.5 mg/dL Final     Anion Gap   Date Value Ref Range Status   04/21/2020 10 8 - 16 mmol/L Final     eGFR if    Date Value Ref Range Status   04/21/2020 >60 >60 mL/min/1.73 m^2 Final     eGFR if non    Date Value Ref Range Status   04/21/2020 60 >60 mL/min/1.73 m^2 Final     Comment:     Calculation used to obtain the estimated glomerular filtration  rate (eGFR) is the CKD-EPI equation.        BNP  @LABRCNTIP(BNP,BNPTRIAGEBLO)@  @LABRCNTIP(troponini)@  CrCl cannot be calculated (Patient's most recent lab result is older than the maximum 7 days allowed.).  No results found in the last 24 hours.  No results found in the last 24 hours.  No results found in the last 24 hours.    Assessment:      1. Orthostatic hypotension     2. Essential hypertension    3. Dyslipidemia    4. Bilateral carotid artery stenosis    5. Mild neurocognitive disorder    6. CKD (chronic kidney disease) stage 3, GFR 30-59 ml/min      Orthostatic hypo improved  Plan:   Continue Midodrine 5mg bid discussed with her daughter no midodrine 4 hours before the dedtime.  Continue Irbesartan 300 mg daily for HTN  Advise pressure stocking  Echo and carotid US pending  Poor social support and may consider long term care facilitate  Continue ASA and statin.  RTC in 3 months    Established Patient - Audio Only Telehealth Visit     The patient location is: home due to COVID 19 pandmeic  The chief complaint leading to consultation is: orthostatic hypotension   Visit type: Virtual visit with audio only (telephone)  15 min     The reason for the audio only service rather than synchronous audio and video virtual visit was related to technical difficulties or patient preference/necessity.     Each patient to whom I provide medical services by telemedicine is:  (1) informed of the relationship between the physician and patient and the respective role of any other health care provider with respect to management of the patient; and (2) notified that they may decline to receive medical services by telemedicine and may withdraw from such care at any time. Patient verbally consented to receive this service via voice-only telephone call.          This service was not originating from a related E/M service provided within the previous 7 days nor will  to an E/M service or procedure within the next 24 hours or my soonest available appointment.  Prevailing standard of care was able to be met in this audio-only visit.

## 2020-05-04 ENCOUNTER — TELEPHONE (OUTPATIENT)
Dept: CARDIOLOGY | Facility: CLINIC | Age: 80
End: 2020-05-04

## 2020-05-04 NOTE — TELEPHONE ENCOUNTER
The patient's daughter wanted to know if the patient could take her midodrine 2.5 mg at night. She said it is hard for the patient to take it at 12.   Please advise.

## 2020-05-07 ENCOUNTER — OFFICE VISIT (OUTPATIENT)
Dept: CARDIOLOGY | Facility: CLINIC | Age: 80
End: 2020-05-07
Payer: MEDICARE

## 2020-05-07 DIAGNOSIS — G31.84 MILD NEUROCOGNITIVE DISORDER: ICD-10-CM

## 2020-05-07 DIAGNOSIS — I10 ESSENTIAL HYPERTENSION: Chronic | ICD-10-CM

## 2020-05-07 DIAGNOSIS — E78.5 DYSLIPIDEMIA: Chronic | ICD-10-CM

## 2020-05-07 DIAGNOSIS — I95.1 ORTHOSTATIC HYPOTENSION: Primary | ICD-10-CM

## 2020-05-07 PROCEDURE — 3288F PR FALLS RISK ASSESSMENT DOCUMENTED: ICD-10-PCS | Mod: HCNC,CPTII,95, | Performed by: INTERNAL MEDICINE

## 2020-05-07 PROCEDURE — 99442 PR PHYSICIAN TELEPHONE EVALUATION 11-20 MIN: ICD-10-PCS | Mod: HCNC,95,, | Performed by: INTERNAL MEDICINE

## 2020-05-07 PROCEDURE — 1159F PR MEDICATION LIST DOCUMENTED IN MEDICAL RECORD: ICD-10-PCS | Mod: HCNC,95,, | Performed by: INTERNAL MEDICINE

## 2020-05-07 PROCEDURE — 99442 PR PHYSICIAN TELEPHONE EVALUATION 11-20 MIN: CPT | Mod: HCNC,95,, | Performed by: INTERNAL MEDICINE

## 2020-05-07 PROCEDURE — 1159F MED LIST DOCD IN RCRD: CPT | Mod: HCNC,95,, | Performed by: INTERNAL MEDICINE

## 2020-05-07 PROCEDURE — 1100F PTFALLS ASSESS-DOCD GE2>/YR: CPT | Mod: HCNC,CPTII,95, | Performed by: INTERNAL MEDICINE

## 2020-05-07 PROCEDURE — 3288F FALL RISK ASSESSMENT DOCD: CPT | Mod: HCNC,CPTII,95, | Performed by: INTERNAL MEDICINE

## 2020-05-07 PROCEDURE — 1100F PR PT FALLS ASSESS DOC 2+ FALLS/FALL W/INJURY/YR: ICD-10-PCS | Mod: HCNC,CPTII,95, | Performed by: INTERNAL MEDICINE

## 2020-05-07 RX ORDER — FLUDROCORTISONE ACETATE 0.1 MG/1
100 TABLET ORAL DAILY
Qty: 30 TABLET | Refills: 0 | Status: SHIPPED | OUTPATIENT
Start: 2020-05-07 | End: 2020-05-26

## 2020-05-07 NOTE — PROGRESS NOTES
Subjective:   Patient ID:  Shannon Solano is a 80 y.o. female who presents for follow up of No chief complaint on file.      79 yo female, orthostatic hypotension f/u  PMH orthostatic hypotension,  HTN carotid A Dz, HTN, h/o CVA 3 yrs ago, CKD.  EKG NSR and non specfic stt change  Echo in 2018 normal EF and moderate MR  04/21/2020 ER visit for dizziness> SBP at 208 mmHG and positive orthostatic hypotension, dropped to 170 mmHG  Brain CT negative in   Carotid US in  mild Dz  No dehydration issue  Lives with her  and poor social support.    Today talked to her daughter. Still dizziness when she got out of the bed or any position change from flat to the upright. Chronic ankle swelling  On recliner [id number] mmHG      Past Medical History:   Diagnosis Date    Anemia     Arthritis     Cataract     Disorder of kidney and ureter     Hyperlipidemia     Hypertension     Insomnia 8/28/2019    Osteoarthritis     Polyneuropathy     Stroke        Past Surgical History:   Procedure Laterality Date    APPENDECTOMY      CATARACT EXTRACTION Bilateral 2014    EYE SURGERY Bilateral     HYSTERECTOMY      VARICOSE VEIN SURGERY         Social History     Tobacco Use    Smoking status: Never Smoker    Smokeless tobacco: Never Used   Substance Use Topics    Alcohol use: No    Drug use: No       Family History   Problem Relation Age of Onset    Breast cancer Sister     Heart disease Sister     Colon cancer Sister         age 70    Cancer Sister         breast    Obesity Mother     Stroke Mother     Obesity Brother     Alzheimer's disease Brother     Cancer Brother     No Known Problems Sister     Hypertension Daughter     Melanoma Neg Hx          ROS    Objective:   Physical Exam    Lab Results   Component Value Date    CHOL 207 (H) 02/14/2020    CHOL 185 09/12/2019    CHOL 178 10/09/2017     Lab Results   Component Value Date    HDL 48 02/14/2020    HDL 38 (L) 09/12/2019    HDL 42  10/09/2017     Lab Results   Component Value Date    LDLCALC 139.6 02/14/2020    LDLCALC 110.8 09/12/2019    LDLCALC 103.2 10/09/2017     Lab Results   Component Value Date    TRIG 97 02/14/2020    TRIG 181 (H) 09/12/2019    TRIG 164 (H) 10/09/2017     Lab Results   Component Value Date    CHOLHDL 23.2 02/14/2020    CHOLHDL 20.5 09/12/2019    CHOLHDL 23.6 10/09/2017       Chemistry        Component Value Date/Time     04/21/2020 1722    K 4.1 04/21/2020 1722     04/21/2020 1722    CO2 26 04/21/2020 1722    BUN 18 04/21/2020 1722    CREATININE 0.9 04/21/2020 1722    GLU 92 04/21/2020 1722        Component Value Date/Time    CALCIUM 9.2 04/21/2020 1722    ALKPHOS 96 04/21/2020 1722    AST 25 04/21/2020 1722    ALT 16 04/21/2020 1722    BILITOT 0.5 04/21/2020 1722    ESTGFRAFRICA >60 04/21/2020 1722    EGFRNONAA 60 04/21/2020 1722          Lab Results   Component Value Date    HGBA1C 5.7 (H) 02/14/2020     Lab Results   Component Value Date    TSH 1.374 09/12/2019     Lab Results   Component Value Date    INR 1.0 04/21/2020    INR 1.0 09/13/2019    INR 1.0 09/12/2019     Lab Results   Component Value Date    WBC 9.13 04/21/2020    HGB 13.6 04/21/2020    HCT 42.3 04/21/2020    MCV 95 04/21/2020     04/21/2020     BMP  Sodium   Date Value Ref Range Status   04/21/2020 142 136 - 145 mmol/L Final     Potassium   Date Value Ref Range Status   04/21/2020 4.1 3.5 - 5.1 mmol/L Final     Chloride   Date Value Ref Range Status   04/21/2020 106 95 - 110 mmol/L Final     CO2   Date Value Ref Range Status   04/21/2020 26 23 - 29 mmol/L Final     BUN, Bld   Date Value Ref Range Status   04/21/2020 18 8 - 23 mg/dL Final     Creatinine   Date Value Ref Range Status   04/21/2020 0.9 0.5 - 1.4 mg/dL Final     Calcium   Date Value Ref Range Status   04/21/2020 9.2 8.7 - 10.5 mg/dL Final     Anion Gap   Date Value Ref Range Status   04/21/2020 10 8 - 16 mmol/L Final     eGFR if    Date Value Ref Range  Status   04/21/2020 >60 >60 mL/min/1.73 m^2 Final     eGFR if non    Date Value Ref Range Status   04/21/2020 60 >60 mL/min/1.73 m^2 Final     Comment:     Calculation used to obtain the estimated glomerular filtration  rate (eGFR) is the CKD-EPI equation.        BNP  @LABRCNTIP(BNP,BNPTRIAGEBLO)@  @LABRCNTIP(troponini)@  CrCl cannot be calculated (Patient's most recent lab result is older than the maximum 7 days allowed.).  No results found in the last 24 hours.  No results found in the last 24 hours.  No results found in the last 24 hours.    Assessment:      1. Orthostatic hypotension    2. Dyslipidemia    3. Essential hypertension    4. Mild neurocognitive disorder        Plan:   Continue Midodrine 5 mg bid  Add Florinef 100 mcg daily  Continue ARB for HTN  Fall precaution  Daughter will get pressure socks today  hydration  RTC as needed      Established Patient - Audio Only Telehealth Visit     The patient location is: home due to COVID 19 pandemic  The chief complaint leading to consultation is: postural dizziness  Visit type: Virtual visit with audio only (telephone)  Total time spent with patient: 15 min       The reason for the audio only service rather than synchronous audio and video virtual visit was related to technical difficulties or patient preference/necessity.     Each patient to whom I provide medical services by telemedicine is:  (1) informed of the relationship between the physician and patient and the respective role of any other health care provider with respect to management of the patient; and (2) notified that they may decline to receive medical services by telemedicine and may withdraw from such care at any time. Patient verbally consented to receive this service via voice-only telephone call.    This service was not originating from a related E/M service provided within the previous 7 days nor will  to an E/M service or procedure within the next 24 hours or my  soonest available appointment.  Prevailing standard of care was able to be met in this audio-only visit.

## 2020-05-18 RX ORDER — IRBESARTAN 300 MG/1
TABLET ORAL
Qty: 90 TABLET | Refills: 0 | Status: SHIPPED | OUTPATIENT
Start: 2020-05-18 | End: 2020-07-27

## 2020-05-22 RX ORDER — GABAPENTIN 400 MG/1
400 CAPSULE ORAL NIGHTLY
Qty: 30 CAPSULE | Refills: 0 | Status: SHIPPED | OUTPATIENT
Start: 2020-05-22 | End: 2020-05-26

## 2020-05-26 RX ORDER — GABAPENTIN 400 MG/1
400 CAPSULE ORAL NIGHTLY
Qty: 90 CAPSULE | Refills: 0 | Status: SHIPPED | OUTPATIENT
Start: 2020-05-26 | End: 2020-10-28 | Stop reason: SDUPTHER

## 2020-05-26 RX ORDER — GABAPENTIN 100 MG/1
CAPSULE ORAL
Qty: 90 CAPSULE | Refills: 0 | Status: SHIPPED | OUTPATIENT
Start: 2020-05-26 | End: 2020-08-21 | Stop reason: SDUPTHER

## 2020-05-26 RX ORDER — FLUDROCORTISONE ACETATE 0.1 MG/1
TABLET ORAL
Qty: 30 TABLET | Refills: 5 | Status: SHIPPED | OUTPATIENT
Start: 2020-05-26 | End: 2020-11-16 | Stop reason: SDUPTHER

## 2020-08-10 ENCOUNTER — OFFICE VISIT (OUTPATIENT)
Dept: CARDIOLOGY | Facility: CLINIC | Age: 80
End: 2020-08-10
Payer: MEDICARE

## 2020-08-10 VITALS
DIASTOLIC BLOOD PRESSURE: 74 MMHG | HEART RATE: 63 BPM | WEIGHT: 151.44 LBS | OXYGEN SATURATION: 98 % | BODY MASS INDEX: 24.45 KG/M2 | SYSTOLIC BLOOD PRESSURE: 158 MMHG

## 2020-08-10 DIAGNOSIS — N18.30 CKD (CHRONIC KIDNEY DISEASE) STAGE 3, GFR 30-59 ML/MIN: Chronic | ICD-10-CM

## 2020-08-10 DIAGNOSIS — I10 ESSENTIAL HYPERTENSION: Primary | Chronic | ICD-10-CM

## 2020-08-10 DIAGNOSIS — I65.23 BILATERAL CAROTID ARTERY STENOSIS: Chronic | ICD-10-CM

## 2020-08-10 DIAGNOSIS — E78.5 DYSLIPIDEMIA: Chronic | ICD-10-CM

## 2020-08-10 PROCEDURE — 99214 PR OFFICE/OUTPT VISIT, EST, LEVL IV, 30-39 MIN: ICD-10-PCS | Mod: HCNC,S$GLB,, | Performed by: INTERNAL MEDICINE

## 2020-08-10 PROCEDURE — 1100F PR PT FALLS ASSESS DOC 2+ FALLS/FALL W/INJURY/YR: ICD-10-PCS | Mod: HCNC,CPTII,S$GLB, | Performed by: INTERNAL MEDICINE

## 2020-08-10 PROCEDURE — 99999 PR PBB SHADOW E&M-EST. PATIENT-LVL V: CPT | Mod: PBBFAC,HCNC,, | Performed by: INTERNAL MEDICINE

## 2020-08-10 PROCEDURE — 99214 OFFICE O/P EST MOD 30 MIN: CPT | Mod: HCNC,S$GLB,, | Performed by: INTERNAL MEDICINE

## 2020-08-10 PROCEDURE — 99999 PR PBB SHADOW E&M-EST. PATIENT-LVL V: ICD-10-PCS | Mod: PBBFAC,HCNC,, | Performed by: INTERNAL MEDICINE

## 2020-08-10 PROCEDURE — 1159F MED LIST DOCD IN RCRD: CPT | Mod: HCNC,S$GLB,, | Performed by: INTERNAL MEDICINE

## 2020-08-10 PROCEDURE — 1100F PTFALLS ASSESS-DOCD GE2>/YR: CPT | Mod: HCNC,CPTII,S$GLB, | Performed by: INTERNAL MEDICINE

## 2020-08-10 PROCEDURE — 3074F PR MOST RECENT SYSTOLIC BLOOD PRESSURE < 130 MM HG: ICD-10-PCS | Mod: HCNC,CPTII,S$GLB, | Performed by: INTERNAL MEDICINE

## 2020-08-10 PROCEDURE — 3288F PR FALLS RISK ASSESSMENT DOCUMENTED: ICD-10-PCS | Mod: HCNC,CPTII,S$GLB, | Performed by: INTERNAL MEDICINE

## 2020-08-10 PROCEDURE — 3078F PR MOST RECENT DIASTOLIC BLOOD PRESSURE < 80 MM HG: ICD-10-PCS | Mod: HCNC,CPTII,S$GLB, | Performed by: INTERNAL MEDICINE

## 2020-08-10 PROCEDURE — 3288F FALL RISK ASSESSMENT DOCD: CPT | Mod: HCNC,CPTII,S$GLB, | Performed by: INTERNAL MEDICINE

## 2020-08-10 PROCEDURE — 1159F PR MEDICATION LIST DOCUMENTED IN MEDICAL RECORD: ICD-10-PCS | Mod: HCNC,S$GLB,, | Performed by: INTERNAL MEDICINE

## 2020-08-10 PROCEDURE — 3078F DIAST BP <80 MM HG: CPT | Mod: HCNC,CPTII,S$GLB, | Performed by: INTERNAL MEDICINE

## 2020-08-10 PROCEDURE — 3074F SYST BP LT 130 MM HG: CPT | Mod: HCNC,CPTII,S$GLB, | Performed by: INTERNAL MEDICINE

## 2020-08-10 NOTE — PROGRESS NOTES
Subjective:   Patient ID:  Shannon Solano is a 80 y.o. female who presents for follow up of Dizziness      81 yo female, orthostatic hypotension f/u  PMH orthostatic hypotension,  HTN carotid A Dz, HTN, h/o CVA 3 yrs ago, CKD.  EKG NSR and non specfic stt change  Echo in 2018 normal EF and moderate MR  04/21/2020 ER visit for dizziness> SBP at 208 mmHG and positive orthostatic hypotension, dropped to 170 mmHG  Brain CT negative in   Carotid US in  mild Dz  No dehydration issue  Lives with her  and poor social support.    Today, states that dizziness improved. No fall and faint  No chest pain, dyspnea and palpitation  Appetite good  BP high at home  In the office orthostatic sign negative      Past Medical History:   Diagnosis Date    Anemia     Arthritis     Cataract     Disorder of kidney and ureter     Hyperlipidemia     Hypertension     Insomnia 8/28/2019    Osteoarthritis     Polyneuropathy     Stroke        Past Surgical History:   Procedure Laterality Date    APPENDECTOMY      CATARACT EXTRACTION Bilateral 2014    EYE SURGERY Bilateral     HYSTERECTOMY      VARICOSE VEIN SURGERY         Social History     Tobacco Use    Smoking status: Never Smoker    Smokeless tobacco: Never Used   Substance Use Topics    Alcohol use: No    Drug use: No       Family History   Problem Relation Age of Onset    Breast cancer Sister     Heart disease Sister     Colon cancer Sister         age 70    Cancer Sister         breast    Obesity Mother     Stroke Mother     Obesity Brother     Alzheimer's disease Brother     Cancer Brother     No Known Problems Sister     Hypertension Daughter     Melanoma Neg Hx          Review of Systems   Constitution: Negative for decreased appetite, diaphoresis, fever, malaise/fatigue and night sweats.   HENT: Negative for nosebleeds.    Eyes: Negative for blurred vision and double vision.   Cardiovascular: Negative for chest pain, claudication,  dyspnea on exertion, irregular heartbeat, leg swelling, orthopnea, palpitations, paroxysmal nocturnal dyspnea and syncope.   Respiratory: Negative for cough, shortness of breath, sleep disturbances due to breathing, snoring, sputum production and wheezing.    Endocrine: Negative for cold intolerance and polyuria.   Hematologic/Lymphatic: Does not bruise/bleed easily.   Skin: Negative for rash.   Musculoskeletal: Negative for back pain, falls, joint pain, joint swelling and neck pain.   Gastrointestinal: Negative for abdominal pain, heartburn, nausea and vomiting.   Genitourinary: Negative for dysuria, frequency and hematuria.   Neurological: Positive for dizziness and light-headedness. Negative for difficulty with concentration, focal weakness, headaches, numbness, seizures and weakness.   Psychiatric/Behavioral: Positive for memory loss. Negative for depression and substance abuse. The patient does not have insomnia.    Allergic/Immunologic: Negative for HIV exposure and hives.       Objective:   Physical Exam   Constitutional: She is oriented to person, place, and time. She appears well-nourished.   HENT:   Head: Normocephalic.   Eyes: Pupils are equal, round, and reactive to light.   Neck: Normal carotid pulses and no JVD present. Carotid bruit is not present. No thyromegaly present.   Cardiovascular: Normal rate, regular rhythm, normal heart sounds and normal pulses.  No extrasystoles are present. PMI is not displaced. Exam reveals no gallop and no S3.   No murmur heard.  Pulmonary/Chest: Breath sounds normal. No stridor. No respiratory distress.   Abdominal: Soft. Bowel sounds are normal. There is no abdominal tenderness. There is no rebound.   Musculoskeletal: Normal range of motion.   Neurological: She is alert and oriented to person, place, and time.   Skin: Skin is intact. No rash noted.   Psychiatric: Her behavior is normal.       Lab Results   Component Value Date    CHOL 207 (H) 02/14/2020    CHOL 185  09/12/2019    CHOL 178 10/09/2017     Lab Results   Component Value Date    HDL 48 02/14/2020    HDL 38 (L) 09/12/2019    HDL 42 10/09/2017     Lab Results   Component Value Date    LDLCALC 139.6 02/14/2020    LDLCALC 110.8 09/12/2019    LDLCALC 103.2 10/09/2017     Lab Results   Component Value Date    TRIG 97 02/14/2020    TRIG 181 (H) 09/12/2019    TRIG 164 (H) 10/09/2017     Lab Results   Component Value Date    CHOLHDL 23.2 02/14/2020    CHOLHDL 20.5 09/12/2019    CHOLHDL 23.6 10/09/2017       Chemistry        Component Value Date/Time     04/21/2020 1722    K 4.1 04/21/2020 1722     04/21/2020 1722    CO2 26 04/21/2020 1722    BUN 18 04/21/2020 1722    CREATININE 0.9 04/21/2020 1722    GLU 92 04/21/2020 1722        Component Value Date/Time    CALCIUM 9.2 04/21/2020 1722    ALKPHOS 96 04/21/2020 1722    AST 25 04/21/2020 1722    ALT 16 04/21/2020 1722    BILITOT 0.5 04/21/2020 1722    ESTGFRAFRICA >60 04/21/2020 1722    EGFRNONAA 60 04/21/2020 1722          Lab Results   Component Value Date    HGBA1C 5.7 (H) 02/14/2020     Lab Results   Component Value Date    TSH 1.374 09/12/2019     Lab Results   Component Value Date    INR 1.0 04/21/2020    INR 1.0 09/13/2019    INR 1.0 09/12/2019     Lab Results   Component Value Date    WBC 9.13 04/21/2020    HGB 13.6 04/21/2020    HCT 42.3 04/21/2020    MCV 95 04/21/2020     04/21/2020     BMP  Sodium   Date Value Ref Range Status   04/21/2020 142 136 - 145 mmol/L Final     Potassium   Date Value Ref Range Status   04/21/2020 4.1 3.5 - 5.1 mmol/L Final     Chloride   Date Value Ref Range Status   04/21/2020 106 95 - 110 mmol/L Final     CO2   Date Value Ref Range Status   04/21/2020 26 23 - 29 mmol/L Final     BUN, Bld   Date Value Ref Range Status   04/21/2020 18 8 - 23 mg/dL Final     Creatinine   Date Value Ref Range Status   04/21/2020 0.9 0.5 - 1.4 mg/dL Final     Calcium   Date Value Ref Range Status   04/21/2020 9.2 8.7 - 10.5 mg/dL Final      Anion Gap   Date Value Ref Range Status   04/21/2020 10 8 - 16 mmol/L Final     eGFR if    Date Value Ref Range Status   04/21/2020 >60 >60 mL/min/1.73 m^2 Final     eGFR if non    Date Value Ref Range Status   04/21/2020 60 >60 mL/min/1.73 m^2 Final     Comment:     Calculation used to obtain the estimated glomerular filtration  rate (eGFR) is the CKD-EPI equation.        BNP  @LABRCNTIP(BNP,BNPTRIAGEBLO)@  @LABRCNTIP(troponini)@  CrCl cannot be calculated (Patient's most recent lab result is older than the maximum 7 days allowed.).  No results found in the last 24 hours.  No results found in the last 24 hours.  No results found in the last 24 hours.    Assessment:      1. Essential hypertension    2. Dyslipidemia    3. Bilateral carotid artery stenosis    4. CKD (chronic kidney disease) stage 3, GFR 30-59 ml/min        Plan:   D/c Midodrine  Continue ARB and FLorinef  Fall preCAUTION  Counseled DASH  Check Lipid profile in 6 months  Recommend heart-healthy diet, weight control and regular exercise.  Nivia. Risk modification.   RTC in 3 months    I have reviewed all pertinent labs and cardiac studies independently. Plans and recommendations have been formulated under my direct supervision. All questions answered and patient voiced understanding.   If symptoms persist go to the ED

## 2020-08-20 ENCOUNTER — TELEPHONE (OUTPATIENT)
Dept: FAMILY MEDICINE | Facility: CLINIC | Age: 80
End: 2020-08-20

## 2020-08-20 NOTE — TELEPHONE ENCOUNTER
----- Message from Crystal Weber sent at 8/20/2020  9:41 AM CDT -----  Regarding: medication refills  Good morning,      Sussy called to check status of all medication refill due to pharmacy has not received a response. She can be reached at [phone].      Thanks,  Crystal Weber

## 2020-08-21 RX ORDER — GABAPENTIN 100 MG/1
CAPSULE ORAL
Qty: 90 CAPSULE | Refills: 0 | Status: SHIPPED | OUTPATIENT
Start: 2020-08-21 | End: 2020-12-29 | Stop reason: DRUGHIGH

## 2020-08-21 RX ORDER — LANOLIN ALCOHOL/MO/W.PET/CERES
100 CREAM (GRAM) TOPICAL DAILY
Qty: 60 TABLET | Refills: 1 | Status: SHIPPED | OUTPATIENT
Start: 2020-08-21 | End: 2020-12-28

## 2020-08-21 NOTE — TELEPHONE ENCOUNTER
----- Message from Yessi Gonzalez sent at 8/21/2020  9:46 AM CDT -----  Type:  RX Refill Request    Who Called: Sussy Harrington  Refill or New Rx:refill  RX Name and Strength:gabapentin 400 mg, folic, thyman  How is the patient currently taking it? (ex. 1XDay):1XDay  Is this a 30 day or 90 day RX:30  Preferred Pharmacy with phone number:.  [facility]/pharmacy #[id number] - [place] - [address]  [address] [address]  Phone: [phone] Fax: [phone]  Local or Mail Order:local  Ordering Provider:Dr Esparza  Would the patient rather a call back or a response via [facility]? Call back  Best Call Back Number:[phone]  Additional Information: [facility] sent a request.     Thank you

## 2020-08-25 ENCOUNTER — TELEPHONE (OUTPATIENT)
Dept: FAMILY MEDICINE | Facility: CLINIC | Age: 80
End: 2020-08-25

## 2020-08-25 NOTE — TELEPHONE ENCOUNTER
----- Message from Luis Waterman sent at 8/25/2020 12:18 PM CDT -----  Type:  RX Refill Request    Who Called:  wayne   Refill or New Rx: refill   RX Name and Strength: gabapentin 100 MG   How is the patient currently taking it? (ex. 1XDay): new to the pharmacy   Is this a 30 day or 90 day RX: 90 day   Preferred Pharmacy with phone number:      [facility]   [phone] or fax [phone]     Local or Mail Order: mail  Ordering Provider: Vilma   Would the patient rather a call back or a response via [facility]? Call   Best Call Back Number:  Additional Information:

## 2020-08-31 ENCOUNTER — TELEPHONE (OUTPATIENT)
Dept: ORTHOPEDICS | Facility: CLINIC | Age: 80
End: 2020-08-31

## 2020-08-31 ENCOUNTER — HOSPITAL ENCOUNTER (OUTPATIENT)
Dept: RADIOLOGY | Facility: HOSPITAL | Age: 80
Discharge: HOME | End: 2020-08-31
Attending: FAMILY MEDICINE
Payer: MEDICARE

## 2020-08-31 ENCOUNTER — OFFICE VISIT (OUTPATIENT)
Dept: FAMILY MEDICINE | Facility: CLINIC | Age: 80
End: 2020-08-31
Attending: FAMILY MEDICINE
Payer: MEDICARE

## 2020-08-31 VITALS
SYSTOLIC BLOOD PRESSURE: 126 MMHG | HEART RATE: 66 BPM | HEIGHT: 66 IN | TEMPERATURE: 99 F | OXYGEN SATURATION: 95 % | BODY MASS INDEX: 24.8 KG/M2 | DIASTOLIC BLOOD PRESSURE: 80 MMHG | WEIGHT: 154.31 LBS

## 2020-08-31 DIAGNOSIS — Z86.73 HISTORY OF CVA IN ADULTHOOD: ICD-10-CM

## 2020-08-31 DIAGNOSIS — M25.519 SHOULDER PAIN, UNSPECIFIED CHRONICITY, UNSPECIFIED LATERALITY: ICD-10-CM

## 2020-08-31 DIAGNOSIS — M25.519 SHOULDER PAIN, UNSPECIFIED CHRONICITY, UNSPECIFIED LATERALITY: Primary | ICD-10-CM

## 2020-08-31 DIAGNOSIS — Z13.820 OSTEOPOROSIS SCREENING: ICD-10-CM

## 2020-08-31 DIAGNOSIS — N18.30 CKD (CHRONIC KIDNEY DISEASE) STAGE 3, GFR 30-59 ML/MIN: ICD-10-CM

## 2020-08-31 DIAGNOSIS — Z78.0 ASYMPTOMATIC MENOPAUSAL STATE: ICD-10-CM

## 2020-08-31 PROCEDURE — 3079F DIAST BP 80-89 MM HG: CPT | Mod: HCNC,CPTII,S$GLB, | Performed by: FAMILY MEDICINE

## 2020-08-31 PROCEDURE — 99214 PR OFFICE/OUTPT VISIT, EST, LEVL IV, 30-39 MIN: ICD-10-PCS | Mod: HCNC,S$GLB,, | Performed by: FAMILY MEDICINE

## 2020-08-31 PROCEDURE — 73030 X-RAY EXAM OF SHOULDER: CPT | Mod: 26,HCNC,RT, | Performed by: RADIOLOGY

## 2020-08-31 PROCEDURE — 3074F PR MOST RECENT SYSTOLIC BLOOD PRESSURE < 130 MM HG: ICD-10-PCS | Mod: HCNC,CPTII,S$GLB, | Performed by: FAMILY MEDICINE

## 2020-08-31 PROCEDURE — 3079F PR MOST RECENT DIASTOLIC BLOOD PRESSURE 80-89 MM HG: ICD-10-PCS | Mod: HCNC,CPTII,S$GLB, | Performed by: FAMILY MEDICINE

## 2020-08-31 PROCEDURE — 1101F PR PT FALLS ASSESS DOC 0-1 FALLS W/OUT INJ PAST YR: ICD-10-PCS | Mod: HCNC,CPTII,S$GLB, | Performed by: FAMILY MEDICINE

## 2020-08-31 PROCEDURE — 1159F MED LIST DOCD IN RCRD: CPT | Mod: HCNC,S$GLB,, | Performed by: FAMILY MEDICINE

## 2020-08-31 PROCEDURE — 1101F PT FALLS ASSESS-DOCD LE1/YR: CPT | Mod: HCNC,CPTII,S$GLB, | Performed by: FAMILY MEDICINE

## 2020-08-31 PROCEDURE — 1125F AMNT PAIN NOTED PAIN PRSNT: CPT | Mod: HCNC,S$GLB,, | Performed by: FAMILY MEDICINE

## 2020-08-31 PROCEDURE — 73030 X-RAY EXAM OF SHOULDER: CPT | Mod: TC,HCNC,PO,RT

## 2020-08-31 PROCEDURE — 99214 OFFICE O/P EST MOD 30 MIN: CPT | Mod: HCNC,S$GLB,, | Performed by: FAMILY MEDICINE

## 2020-08-31 PROCEDURE — 99999 PR PBB SHADOW E&M-EST. PATIENT-LVL V: CPT | Mod: PBBFAC,HCNC,, | Performed by: FAMILY MEDICINE

## 2020-08-31 PROCEDURE — 1159F PR MEDICATION LIST DOCUMENTED IN MEDICAL RECORD: ICD-10-PCS | Mod: HCNC,S$GLB,, | Performed by: FAMILY MEDICINE

## 2020-08-31 PROCEDURE — 73030 XR SHOULDER COMPLETE 2 OR MORE VIEWS RIGHT: ICD-10-PCS | Mod: 26,HCNC,RT, | Performed by: RADIOLOGY

## 2020-08-31 PROCEDURE — 99999 PR PBB SHADOW E&M-EST. PATIENT-LVL V: ICD-10-PCS | Mod: PBBFAC,HCNC,, | Performed by: FAMILY MEDICINE

## 2020-08-31 PROCEDURE — 3074F SYST BP LT 130 MM HG: CPT | Mod: HCNC,CPTII,S$GLB, | Performed by: FAMILY MEDICINE

## 2020-08-31 PROCEDURE — 1125F PR PAIN SEVERITY QUANTIFIED, PAIN PRESENT: ICD-10-PCS | Mod: HCNC,S$GLB,, | Performed by: FAMILY MEDICINE

## 2020-08-31 RX ORDER — DICLOFENAC SODIUM 10 MG/G
2 GEL TOPICAL DAILY
Qty: 50 G | Refills: 0 | Status: SHIPPED | OUTPATIENT
Start: 2020-08-31 | End: 2020-10-13

## 2020-08-31 NOTE — PROGRESS NOTES
Subjective:       Patient ID: Shannon Solano is a 80 y.o. female.    Chief Complaint: Shoulder Pain (right)    80 y old female with hx of R shoulder ac arthropathy, CVA, stage 3 ckd  with worsening shoulder pain for weeks. No recent injuries . Worsen with abduction . Not taking any meds .     Review of Systems   Constitutional: Negative.    HENT: Negative.    Eyes: Negative.    Respiratory: Negative.    Cardiovascular: Negative.    Gastrointestinal: Negative.    Genitourinary: Negative.    Musculoskeletal: Positive for arthralgias.   Skin: Negative.    Hematological: Negative.        Objective:      Physical Exam  Vitals signs and nursing note reviewed.   Constitutional:       General: She is not in acute distress.     Appearance: She is well-developed. She is not diaphoretic.   HENT:      Head: Normocephalic and atraumatic.      Right Ear: External ear normal.      Left Ear: External ear normal.      Nose: Nose normal.   Eyes:      General: No scleral icterus.        Left eye: No discharge.      Conjunctiva/sclera: Conjunctivae normal.      Pupils: Pupils are equal, round, and reactive to light.   Neck:      Musculoskeletal: Normal range of motion and neck supple.      Thyroid: No thyromegaly.      Vascular: No JVD.      Trachea: No tracheal deviation.   Cardiovascular:      Rate and Rhythm: Normal rate and regular rhythm.      Heart sounds: Normal heart sounds. No murmur. No friction rub. No gallop.    Pulmonary:      Effort: Pulmonary effort is normal. No respiratory distress.      Breath sounds: Normal breath sounds. No wheezing or rales.   Chest:      Chest wall: No tenderness.   Abdominal:      General: Bowel sounds are normal. There is no distension.      Palpations: Abdomen is soft. There is no mass.      Tenderness: There is no abdominal tenderness. There is no guarding.   Musculoskeletal:      Right shoulder: She exhibits decreased range of motion, tenderness and bony tenderness.   Lymphadenopathy:       Cervical: No cervical adenopathy.         Assessment:     Shannon was seen today for shoulder pain.    Diagnoses and all orders for this visit:    Shoulder pain, unspecified chronicity, unspecified laterality  -     X-ray Shoulder 2 or More Views Right; Future  -     Ambulatory referral/consult to Orthopedics; Future    Osteoporosis screening  -     DXA Bone Density Spine And Hip; Future    Asymptomatic menopausal state   -     DXA Bone Density Spine And Hip; Future    History of CVA in adulthood    CKD (chronic kidney disease) stage 3, GFR 30-59 ml/min    Other orders  -     diclofenac sodium (VOLTAREN ARTHRITIS PAIN) 1 % Gel; Apply 2 g topically once daily.          Plan:     Shannon was seen today for shoulder pain.    Diagnoses and all orders for this visit:    Shoulder pain, unspecified chronicity, unspecified laterality  -     X-ray Shoulder 2 or More Views Right; Future    Osteoporosis screening  -     DXA Bone Density Spine And Hip; Future    Asymptomatic menopausal state   -     DXA Bone Density Spine And Hip; Future    Other orders  -     diclofenac sodium (VOLTAREN ARTHRITIS PAIN) 1 % Gel; Apply 2 g topically once daily.     X rays   Topical NSAID  F.u with Ortho .

## 2020-09-01 ENCOUNTER — TELEPHONE (OUTPATIENT)
Dept: ORTHOPEDICS | Facility: CLINIC | Age: 80
End: 2020-09-01

## 2020-09-02 ENCOUNTER — OFFICE VISIT (OUTPATIENT)
Dept: SPORTS MEDICINE | Facility: CLINIC | Age: 80
End: 2020-09-02
Attending: FAMILY MEDICINE
Payer: MEDICARE

## 2020-09-02 VITALS
HEART RATE: 64 BPM | HEIGHT: 66 IN | DIASTOLIC BLOOD PRESSURE: 80 MMHG | SYSTOLIC BLOOD PRESSURE: 140 MMHG | BODY MASS INDEX: 24.8 KG/M2 | WEIGHT: 154.31 LBS

## 2020-09-02 DIAGNOSIS — M19.011 ARTHRITIS OF RIGHT ACROMIOCLAVICULAR JOINT: Primary | ICD-10-CM

## 2020-09-02 DIAGNOSIS — M25.519 SHOULDER PAIN, UNSPECIFIED CHRONICITY, UNSPECIFIED LATERALITY: ICD-10-CM

## 2020-09-02 DIAGNOSIS — N18.30 CKD (CHRONIC KIDNEY DISEASE) STAGE 3, GFR 30-59 ML/MIN: Chronic | ICD-10-CM

## 2020-09-02 DIAGNOSIS — R73.03 PRE-DIABETES: Chronic | ICD-10-CM

## 2020-09-02 PROCEDURE — 1159F MED LIST DOCD IN RCRD: CPT | Mod: HCNC,S$GLB,, | Performed by: FAMILY MEDICINE

## 2020-09-02 PROCEDURE — 3079F DIAST BP 80-89 MM HG: CPT | Mod: HCNC,CPTII,S$GLB, | Performed by: FAMILY MEDICINE

## 2020-09-02 PROCEDURE — 1125F AMNT PAIN NOTED PAIN PRSNT: CPT | Mod: HCNC,S$GLB,, | Performed by: FAMILY MEDICINE

## 2020-09-02 PROCEDURE — 1101F PR PT FALLS ASSESS DOC 0-1 FALLS W/OUT INJ PAST YR: ICD-10-PCS | Mod: HCNC,CPTII,S$GLB, | Performed by: FAMILY MEDICINE

## 2020-09-02 PROCEDURE — 1101F PT FALLS ASSESS-DOCD LE1/YR: CPT | Mod: HCNC,CPTII,S$GLB, | Performed by: FAMILY MEDICINE

## 2020-09-02 PROCEDURE — 3077F PR MOST RECENT SYSTOLIC BLOOD PRESSURE >= 140 MM HG: ICD-10-PCS | Mod: HCNC,CPTII,S$GLB, | Performed by: FAMILY MEDICINE

## 2020-09-02 PROCEDURE — 99999 PR PBB SHADOW E&M-EST. PATIENT-LVL IV: ICD-10-PCS | Mod: PBBFAC,HCNC,, | Performed by: FAMILY MEDICINE

## 2020-09-02 PROCEDURE — 99214 OFFICE O/P EST MOD 30 MIN: CPT | Mod: HCNC,25,S$GLB, | Performed by: FAMILY MEDICINE

## 2020-09-02 PROCEDURE — 3079F PR MOST RECENT DIASTOLIC BLOOD PRESSURE 80-89 MM HG: ICD-10-PCS | Mod: HCNC,CPTII,S$GLB, | Performed by: FAMILY MEDICINE

## 2020-09-02 PROCEDURE — 1125F PR PAIN SEVERITY QUANTIFIED, PAIN PRESENT: ICD-10-PCS | Mod: HCNC,S$GLB,, | Performed by: FAMILY MEDICINE

## 2020-09-02 PROCEDURE — 1159F PR MEDICATION LIST DOCUMENTED IN MEDICAL RECORD: ICD-10-PCS | Mod: HCNC,S$GLB,, | Performed by: FAMILY MEDICINE

## 2020-09-02 PROCEDURE — 20610 DRAIN/INJ JOINT/BURSA W/O US: CPT | Mod: HCNC,RT,S$GLB, | Performed by: FAMILY MEDICINE

## 2020-09-02 PROCEDURE — 99999 PR PBB SHADOW E&M-EST. PATIENT-LVL IV: CPT | Mod: PBBFAC,HCNC,, | Performed by: FAMILY MEDICINE

## 2020-09-02 PROCEDURE — 99214 PR OFFICE/OUTPT VISIT, EST, LEVL IV, 30-39 MIN: ICD-10-PCS | Mod: HCNC,25,S$GLB, | Performed by: FAMILY MEDICINE

## 2020-09-02 PROCEDURE — 20610 LARGE JOINT ASPIRATION/INJECTION: R SUBACROMIAL BURSA: ICD-10-PCS | Mod: HCNC,RT,S$GLB, | Performed by: FAMILY MEDICINE

## 2020-09-02 PROCEDURE — 3077F SYST BP >= 140 MM HG: CPT | Mod: HCNC,CPTII,S$GLB, | Performed by: FAMILY MEDICINE

## 2020-09-02 RX ORDER — BETAMETHASONE SODIUM PHOSPHATE AND BETAMETHASONE ACETATE 3; 3 MG/ML; MG/ML
6 INJECTION, SUSPENSION INTRA-ARTICULAR; INTRALESIONAL; INTRAMUSCULAR; SOFT TISSUE
Status: DISCONTINUED | OUTPATIENT
Start: 2020-09-02 | End: 2020-09-02 | Stop reason: HOSPADM

## 2020-09-02 RX ADMIN — BETAMETHASONE SODIUM PHOSPHATE AND BETAMETHASONE ACETATE 6 MG: 3; 3 INJECTION, SUSPENSION INTRA-ARTICULAR; INTRALESIONAL; INTRAMUSCULAR; SOFT TISSUE at 08:09

## 2020-09-02 NOTE — PROCEDURES
Large Joint Aspiration/Injection: R subacromial bursa    Date/Time: 9/2/2020 8:00 AM  Performed by: Fredi Pena MD  Authorized by: Fredi Pena MD     Indications:  Pain and arthritis  Site marked: the procedure site was marked    Timeout: prior to procedure the correct patient, procedure, and site was verified    Prep: patient was prepped and draped in usual sterile fashion    Local anesthetic:  Bupivacaine 0.25% without epinephrine and lidocaine 1% without epinephrine  Approach:  Posterior  Location:  Shoulder  Site:  R subacromial bursa  Medications:  6 mg betamethasone acetate-betamethasone sodium phosphate 6 mg/mL  Patient tolerance:  Patient tolerated the procedure well with no immediate complications

## 2020-09-02 NOTE — PROGRESS NOTES
Subjective:     Patient ID: Shannon Solano is a 80 y.o. female.    Chief Complaint: Pain of the Right Upper Arm      HPI:  Patient here with her daughter today and states that she has had pain in the right upper arm and shoulder for the last 1-2 months.  She does not know of any specific injury although she has had intermittent pain in this area through the years and remembers getting a cortisone injection a few years ago that was helpful.  She would like to have a cortisone injection today to help decrease pain improve function.  She states that at times when trying to lift an object her pain can be as high as 10/10 but she is able to sleep okay at night.  She has not been to physical therapy and does not take any medication regular basis for the pain    She has restricted range of motion lifting her arm to the side or her neck or back.  She is diabetic.    Past Medical History:   Diagnosis Date    Anemia     Arthritis     Cataract     Disorder of kidney and ureter     Hyperlipidemia     Hypertension     Insomnia 8/28/2019    Osteoarthritis     Polyneuropathy     Stroke      Past Surgical History:   Procedure Laterality Date    APPENDECTOMY      CATARACT EXTRACTION Bilateral 2014    EYE SURGERY Bilateral     HYSTERECTOMY      VARICOSE VEIN SURGERY       Family History   Problem Relation Age of Onset    Breast cancer Sister     Heart disease Sister     Colon cancer Sister         age 70    Cancer Sister         breast    Obesity Mother     Stroke Mother     Obesity Brother     Alzheimer's disease Brother     Cancer Brother     No Known Problems Sister     Hypertension Daughter     Melanoma Neg Hx      Social History     Socioeconomic History    Marital status:      Spouse name: Not on file    Number of children: 2    Years of education: Not on file    Highest education level: Not on file   Occupational History    Not on file   Social Needs    Financial resource strain: Not on  file    Food insecurity     Worry: Not on file     Inability: Not on file    Transportation needs     Medical: Not on file     Non-medical: Not on file   Tobacco Use    Smoking status: Never Smoker    Smokeless tobacco: Never Used   Substance and Sexual Activity    Alcohol use: No    Drug use: No    Sexual activity: Not Currently   Lifestyle    Physical activity     Days per week: Not on file     Minutes per session: Not on file    Stress: Not on file   Relationships    Social connections     Talks on phone: Not on file     Gets together: Not on file     Attends Moravian service: Not on file     Active member of club or organization: Not on file     Attends meetings of clubs or organizations: Not on file     Relationship status: Not on file   Other Topics Concern    Are you pregnant or think you may be? No    Breast-feeding No   Social History Narrative    Not on file       Current Outpatient Medications:     aspirin (ECOTRIN) 81 MG EC tablet, Take 81 mg by mouth once daily., Disp: , Rfl:     atorvastatin (LIPITOR) 40 MG tablet, Take 1 tablet (40 mg total) by mouth once daily., Disp: 90 tablet, Rfl: 3    diclofenac sodium (VOLTAREN ARTHRITIS PAIN) 1 % Gel, Apply 2 g topically once daily., Disp: 50 g, Rfl: 0    donepezil (ARICEPT) 10 MG tablet, Take 1 tablet (10 mg total) by mouth every evening., Disp: 30 tablet, Rfl: 11    fludrocortisone (FLORINEF) 0.1 mg Tab, TAKE 1 TABLET BY MOUTH EVERY DAY, Disp: 30 tablet, Rfl: 5    folic acid-vit B6-vit B12 2.5-25-2 mg (FOLBIC OR EQUIV) 2.5-25-2 mg Tab, Take 1 tablet by mouth once daily., Disp: 60 tablet, Rfl: 1    gabapentin (NEURONTIN) 100 MG capsule, TAKE 1 CAPSULE BY MOUTH AT BEDTIME FOR A TOTAL  MG AT BEDTIME, Disp: 90 capsule, Rfl: 0    gabapentin (NEURONTIN) 400 MG capsule, TAKE 1 CAPSULE (400 MG TOTAL) BY MOUTH EVERY EVENING., Disp: 90 capsule, Rfl: 0    irbesartan (AVAPRO) 300 MG tablet, TAKE 1 TABLET BY MOUTH EVERY DAY, Disp: 90 tablet,  Rfl: 0    memantine (NAMENDA) 10 MG Tab, TAKE 1 TABLET BY MOUTH TWICE A DAY, Disp: 180 tablet, Rfl: 11    multivitamin (ONE DAILY MULTIVITAMIN) per tablet, Take 1 tablet by mouth once daily., Disp: , Rfl:     thiamine (VITAMIN B-1) 100 MG tablet, Take 1 tablet (100 mg total) by mouth once daily., Disp: 60 tablet, Rfl: 1    traZODone (DESYREL) 50 MG tablet, TAKE 1 TABLET (50 MG TOTAL) BY MOUTH EVERY EVENING., Disp: 90 tablet, Rfl: 3  Review of patient's allergies indicates:  No Known Allergies  Review of Systems   Constitutional: Negative for chills, fever and weight loss.   Respiratory: Negative for shortness of breath.    Cardiovascular: Negative for chest pain and palpitations.       Objective:   Body mass index is 24.91 kg/m².  Vitals:    09/02/20 0818   BP: (!) 140/80   Pulse: 64           Ortho/SPM Exam-alert and oriented well-nourished well-developed ambulatory elderly adult female pleasant in no acute distress accompanied by her daughter today.    Respiratory effort is normal    Right shoulder-no acute deformity but generalized atrophy is noted    Mild tenderness to palpation diffusely of the anterior lateral joint area    The patient has positive Piper impingement with decreased internal rotation she can not maintain empty can position against gravity for seconds with her elbow at her side she has internal and external rotation for age    Patient has abduction to about 80° and flexion to 90°    Neurovascular intact    Psychiatric good affect and cognition    IMAGING: X-ray Shoulder 2 or More Views Right  Narrative: EXAMINATION:  XR SHOULDER COMPLETE 2 OR MORE VIEWS RIGHT    CLINICAL HISTORY:  Pain in unspecified shoulder    TECHNIQUE:  Two or three views of the right shoulder were performed.    COMPARISON:  02/05/2016.  09/19/2013.    FINDINGS:  Degenerative changes at the AC joint with mild marginal spurring.  Mild degenerative changes noted at the glenohumeral joint as well.  No acute displaced  fracture.  Bones are demineralized.  Visualized right upper lung is clear.  Soft tissues appear normal.  Overall, no significant change since the prior exams.  Follow-up can be performed as per the patient's symptomatology.  Impression: As above    Electronically signed by: Marcel Frey MD  Date:    08/31/2020  Time:    09:26       Radiographs / Imaging : Relevant imaging results reviewed by me and interpreted by me, discussed with the patient and / or family -radiographs reviewed by me and discussed with family in clinic regarding degenerative changes of the glenohumeral joint and the AC joint but no severe abnormality and no acute fracture dislocation    Discussion-the patient received a cortisone injection today and will start using ice and Tylenol and Voltaren gel and if not doing better on recheck in 6 weeks will start physical therapy    Appreciate the opportunity to see this nice lady today to help with her shoulder problem.  Assessment:     Encounter Diagnosis   Name Primary?    Shoulder pain, unspecified chronicity, unspecified laterality         Plan:   Shoulder pain, unspecified chronicity, unspecified laterality  -     Ambulatory referral/consult to Orthopedics  -     Large Joint Aspiration/Injection: R subacromial bursa        The patient verbalized good understanding of the medical issues discussed today and expressed appreciation for the care provided.  Patient was given the opportunity to ask questions and be an active participant in their medical care. Patient had no further questions or concerns at this time.     The patient was encouraged to participate in appropriate physical activity.      Fredi Pena M.D.  [facility]        This note was dictated using voice recognition software and may have sound a like errors.

## 2020-09-02 NOTE — LETTER
September 2, 2020      Leslie Poe MD  [address] [address]           [facility]  [address] [address]          Patient: Shannon Solano   MR Number: [id number]   YOB: 1940   Date of Visit: 9/2/2020       Dear Dr. Leslie Poe:    Thank you for referring Shannon Solano to me for evaluation. Attached you will find relevant portions of my assessment and plan of care.    If you have questions, please do not hesitate to call me. I look forward to following Shannon Solano along with you.    Sincerely,    Fredi Pena MD    Enclosure  CC:  No Recipients    If you would like to receive this communication electronically, please contact [email or web address] or [phone] to request more information on [employer] Link access.    For providers and/or their staff who would like to refer a patient to [facility], please contact us through our one-stop-shop provider referral line, [facility], at [phone].    If you feel you have received this communication in error or would no longer like to receive these types of communications, please e-mail [email or web address]

## 2020-09-02 NOTE — PATIENT INSTRUCTIONS
Cortisone injection done today to right shoulder-apply ice for 15 min 3 times a day today and tomorrow and as needed for pain    Tylenol twice a day as needed    Voltaren gel 2 to 3 times a day to shoulder    Recheck in office 6 weeks either at the [facility] or here at [facility]

## 2020-09-03 ENCOUNTER — APPOINTMENT (OUTPATIENT)
Dept: RADIOLOGY | Facility: HOSPITAL | Age: 80
End: 2020-09-03
Attending: FAMILY MEDICINE
Payer: MEDICARE

## 2020-09-03 ENCOUNTER — TELEPHONE (OUTPATIENT)
Dept: FAMILY MEDICINE | Facility: CLINIC | Age: 80
End: 2020-09-03

## 2020-09-03 DIAGNOSIS — Z13.820 OSTEOPOROSIS SCREENING: ICD-10-CM

## 2020-09-03 DIAGNOSIS — Z78.0 ASYMPTOMATIC MENOPAUSAL STATE: ICD-10-CM

## 2020-09-03 DIAGNOSIS — M85.80 OSTEOPENIA, UNSPECIFIED LOCATION: Primary | ICD-10-CM

## 2020-09-03 PROCEDURE — 77080 DEXA BONE DENSITY SPINE HIP: ICD-10-PCS | Mod: 26,HCNC,, | Performed by: RADIOLOGY

## 2020-09-03 PROCEDURE — 77080 DXA BONE DENSITY AXIAL: CPT | Mod: TC,HCNC

## 2020-09-03 PROCEDURE — 77080 DXA BONE DENSITY AXIAL: CPT | Mod: 26,HCNC,, | Performed by: RADIOLOGY

## 2020-09-09 ENCOUNTER — TELEPHONE (OUTPATIENT)
Dept: NEUROLOGY | Facility: CLINIC | Age: 80
End: 2020-09-09

## 2020-09-09 ENCOUNTER — TELEPHONE (OUTPATIENT)
Dept: FAMILY MEDICINE | Facility: CLINIC | Age: 80
End: 2020-09-09

## 2020-09-09 NOTE — TELEPHONE ENCOUNTER
----- Message from Raeann Hassan sent at 9/9/2020  1:33 PM CDT -----  Regarding: RET CALL  Contact: PATIENT  Type:  Patient Returning Call    Who Called:DAUGHTER   Who Left Message for Patient: DIEGO  Does the patient know what this is regarding?: STICKER  Would the patient rather a call back or a response via [facility]? CALL  Best Call Back Number:[phone]  Additional Information: PLEASE CALL AGAIN

## 2020-09-09 NOTE — TELEPHONE ENCOUNTER
Daughter called in about mom forgetting things , wants to know if medication can be changed . Advised her that she is on both medications for memory and it doesn't cure the disease it just slows the progression down . Advised her that she will have things she will not remember . She stated that she is doing awesome with her ADLS but she is having trouble remembering small things .

## 2020-09-09 NOTE — TELEPHONE ENCOUNTER
----- Message from Raeann Hassan sent at 9/9/2020  9:08 AM CDT -----  Regarding: Calling concerning patient's memory issues and would like a call back  Contact: Sussy daughter  Please call Sussy @ [phone]. thanks

## 2020-09-09 NOTE — TELEPHONE ENCOUNTER
----- Message from Raeann Hassan sent at 9/9/2020  9:11 AM CDT -----  Regarding: Need a list of all her providers. please call Sussy @ [phone]  Contact: daughter Sussy

## 2020-09-09 NOTE — TELEPHONE ENCOUNTER
Spoke with pt's daughter, she was calling for all the doctors that patient sees. Gave her the names of the recent ones that she is scheduled to see and have seen in the past. Daughter verbalized understanding.

## 2020-09-09 NOTE — TELEPHONE ENCOUNTER
Spoke with pt's daughter, informed her that handicap form is at the front for . She verbalized understanding.

## 2020-09-09 NOTE — TELEPHONE ENCOUNTER
Patients daughter stopped by clinic stating that patient needs a handicap tag. Form is in office.

## 2020-09-11 ENCOUNTER — TELEPHONE (OUTPATIENT)
Dept: RHEUMATOLOGY | Facility: CLINIC | Age: 80
End: 2020-09-11

## 2020-09-14 ENCOUNTER — OFFICE VISIT (OUTPATIENT)
Dept: RHEUMATOLOGY | Facility: CLINIC | Age: 80
End: 2020-09-14
Payer: MEDICARE

## 2020-09-14 ENCOUNTER — LAB VISIT (OUTPATIENT)
Dept: LAB | Facility: HOSPITAL | Age: 80
End: 2020-09-14
Attending: PHYSICIAN ASSISTANT
Payer: MEDICARE

## 2020-09-14 ENCOUNTER — PATIENT OUTREACH (OUTPATIENT)
Dept: ADMINISTRATIVE | Facility: OTHER | Age: 80
End: 2020-09-14

## 2020-09-14 VITALS
WEIGHT: 149.94 LBS | HEART RATE: 80 BPM | HEIGHT: 66 IN | SYSTOLIC BLOOD PRESSURE: 120 MMHG | BODY MASS INDEX: 24.1 KG/M2 | DIASTOLIC BLOOD PRESSURE: 70 MMHG

## 2020-09-14 DIAGNOSIS — M85.80 OSTEOPENIA, UNSPECIFIED LOCATION: ICD-10-CM

## 2020-09-14 DIAGNOSIS — Z91.81 AT RISK FOR FALLS: Primary | ICD-10-CM

## 2020-09-14 DIAGNOSIS — Z91.81 AT RISK FOR FALLS: ICD-10-CM

## 2020-09-14 LAB
25(OH)D3+25(OH)D2 SERPL-MCNC: 30 NG/ML (ref 30–96)
ALBUMIN SERPL BCP-MCNC: 3.7 G/DL (ref 3.5–5.2)
ALP SERPL-CCNC: 101 U/L (ref 55–135)
ALT SERPL W/O P-5'-P-CCNC: 26 U/L (ref 10–44)
ANION GAP SERPL CALC-SCNC: 10 MMOL/L (ref 8–16)
AST SERPL-CCNC: 27 U/L (ref 10–40)
BILIRUB SERPL-MCNC: 0.8 MG/DL (ref 0.1–1)
BUN SERPL-MCNC: 18 MG/DL (ref 8–23)
CALCIUM SERPL-MCNC: 9.3 MG/DL (ref 8.7–10.5)
CHLORIDE SERPL-SCNC: 104 MMOL/L (ref 95–110)
CO2 SERPL-SCNC: 28 MMOL/L (ref 23–29)
CREAT SERPL-MCNC: 1.1 MG/DL (ref 0.5–1.4)
EST. GFR  (AFRICAN AMERICAN): 54 ML/MIN/1.73 M^2
EST. GFR  (NON AFRICAN AMERICAN): 47 ML/MIN/1.73 M^2
GLUCOSE SERPL-MCNC: 138 MG/DL (ref 70–110)
POTASSIUM SERPL-SCNC: 4 MMOL/L (ref 3.5–5.1)
PROT SERPL-MCNC: 6.8 G/DL (ref 6–8.4)
SODIUM SERPL-SCNC: 142 MMOL/L (ref 136–145)

## 2020-09-14 PROCEDURE — 99204 OFFICE O/P NEW MOD 45 MIN: CPT | Mod: HCNC,S$GLB,, | Performed by: PHYSICIAN ASSISTANT

## 2020-09-14 PROCEDURE — 99204 PR OFFICE/OUTPT VISIT, NEW, LEVL IV, 45-59 MIN: ICD-10-PCS | Mod: HCNC,S$GLB,, | Performed by: PHYSICIAN ASSISTANT

## 2020-09-14 PROCEDURE — 1159F PR MEDICATION LIST DOCUMENTED IN MEDICAL RECORD: ICD-10-PCS | Mod: HCNC,S$GLB,, | Performed by: PHYSICIAN ASSISTANT

## 2020-09-14 PROCEDURE — 1159F MED LIST DOCD IN RCRD: CPT | Mod: HCNC,S$GLB,, | Performed by: PHYSICIAN ASSISTANT

## 2020-09-14 PROCEDURE — 1125F AMNT PAIN NOTED PAIN PRSNT: CPT | Mod: HCNC,S$GLB,, | Performed by: PHYSICIAN ASSISTANT

## 2020-09-14 PROCEDURE — 1101F PR PT FALLS ASSESS DOC 0-1 FALLS W/OUT INJ PAST YR: ICD-10-PCS | Mod: HCNC,CPTII,S$GLB, | Performed by: PHYSICIAN ASSISTANT

## 2020-09-14 PROCEDURE — 99999 PR PBB SHADOW E&M-EST. PATIENT-LVL V: ICD-10-PCS | Mod: PBBFAC,HCNC,, | Performed by: PHYSICIAN ASSISTANT

## 2020-09-14 PROCEDURE — 36415 COLL VENOUS BLD VENIPUNCTURE: CPT | Mod: HCNC

## 2020-09-14 PROCEDURE — 3074F PR MOST RECENT SYSTOLIC BLOOD PRESSURE < 130 MM HG: ICD-10-PCS | Mod: HCNC,CPTII,S$GLB, | Performed by: PHYSICIAN ASSISTANT

## 2020-09-14 PROCEDURE — 3078F DIAST BP <80 MM HG: CPT | Mod: HCNC,CPTII,S$GLB, | Performed by: PHYSICIAN ASSISTANT

## 2020-09-14 PROCEDURE — 80053 COMPREHEN METABOLIC PANEL: CPT | Mod: HCNC

## 2020-09-14 PROCEDURE — 3078F PR MOST RECENT DIASTOLIC BLOOD PRESSURE < 80 MM HG: ICD-10-PCS | Mod: HCNC,CPTII,S$GLB, | Performed by: PHYSICIAN ASSISTANT

## 2020-09-14 PROCEDURE — 1101F PT FALLS ASSESS-DOCD LE1/YR: CPT | Mod: HCNC,CPTII,S$GLB, | Performed by: PHYSICIAN ASSISTANT

## 2020-09-14 PROCEDURE — 1125F PR PAIN SEVERITY QUANTIFIED, PAIN PRESENT: ICD-10-PCS | Mod: HCNC,S$GLB,, | Performed by: PHYSICIAN ASSISTANT

## 2020-09-14 PROCEDURE — 3074F SYST BP LT 130 MM HG: CPT | Mod: HCNC,CPTII,S$GLB, | Performed by: PHYSICIAN ASSISTANT

## 2020-09-14 PROCEDURE — 99999 PR PBB SHADOW E&M-EST. PATIENT-LVL V: CPT | Mod: PBBFAC,HCNC,, | Performed by: PHYSICIAN ASSISTANT

## 2020-09-14 PROCEDURE — 82306 VITAMIN D 25 HYDROXY: CPT | Mod: HCNC

## 2020-09-14 RX ORDER — SODIUM CHLORIDE 0.9 % (FLUSH) 0.9 %
10 SYRINGE (ML) INJECTION
Status: CANCELLED | OUTPATIENT
Start: 2020-09-14

## 2020-09-14 RX ORDER — ZOLEDRONIC ACID 5 MG/100ML
5 INJECTION, SOLUTION INTRAVENOUS
Status: CANCELLED | OUTPATIENT
Start: 2020-09-14

## 2020-09-14 RX ORDER — HEPARIN 100 UNIT/ML
500 SYRINGE INTRAVENOUS
Status: CANCELLED | OUTPATIENT
Start: 2020-09-14

## 2020-09-14 RX ORDER — ACETAMINOPHEN 325 MG/1
650 TABLET ORAL ONCE
Status: CANCELLED
Start: 2020-09-14

## 2020-09-14 NOTE — PROGRESS NOTES
Health Maintenance Due   Topic Date Due    Shingles Vaccine (1 of 2) 04/14/1988     Updates were requested from care everywhere.  Chart was reviewed for overdue Proactive [facility] Encounters (TRUDI) topics (CRS, Breast Cancer Screening, Eye exam)  Health Maintenance has been updated.  LINKS immunization registry triggered.  Immunizations were reconciled.

## 2020-09-14 NOTE — LETTER
September 14, 2020      Leslie Poe MD  [address] [facility] [place] [address]           [facility]  [address] [place] [address] [address]I [address] [address]  Phone: [phone]  Fax: [phone]          Patient: Shannon Solano   MR Number: [id number]   YOB: 1940   Date of Visit: 9/14/2020       Dear Dr. Leslie Poe:    Thank you for referring Shannon Solano to me for evaluation. Attached you will find relevant portions of my assessment and plan of care.    If you have questions, please do not hesitate to call me. I look forward to following Shannon Solano along with you.    Sincerely,    Acacia Boland PA-C    Enclosure  CC:  No Recipients    If you would like to receive this communication electronically, please contact [email or web address] or [phone] to request more information on [employer] Link access.    For providers and/or their staff who would like to refer a patient to [facility], please contact us through our one-stop-shop provider referral line, [facility] , at [phone].    If you feel you have received this communication in error or would no longer like to receive these types of communications, please e-mail [email or web address]

## 2020-09-14 NOTE — PATIENT INSTRUCTIONS
Cont vit D daily 1000units   Calcium 1200mg/day     Start Reclast infusion, IV once a year   Infusion nurses will call you to set up the infusion  Make sure drink lots of water day before, of and after infusion       Zoledronic Acid injection (Paget's Disease, Osteoporosis)  What is this medicine?  ZOLEDRONIC ACID (ERMELINDA le dron ik AS id) lowers the amount of calcium loss from bone. It is used to treat Paget's disease and osteoporosis in women.  How should I use this medicine?  This medicine is for infusion into a vein. It is given by a health care professional in a hospital or clinic setting.  Talk to your pediatrician regarding the use of this medicine in children. This medicine is not approved for use in children.  What side effects may I notice from receiving this medicine?  Side effects that you should report to your doctor or health care professional as soon as possible:  · allergic reactions like skin rash, itching or hives, swelling of the face, lips, or tongue  · anxiety, confusion, or depression  · breathing problems  · changes in vision  · eye pain  · feeling faint or lightheaded, falls  · jaw pain, especially after dental work  · mouth sores  · muscle cramps, stiffness, or weakness  · redness, blistering, peeling or loosening of the skin, including inside the mouth  · trouble passing urine or change in the amount of urine  Side effects that usually do not require medical attention (report to your doctor or health care professional if they continue or are bothersome):  · bone, joint, or muscle pain  · constipation  · diarrhea  · fever  · hair loss  · irritation at site where injected  · loss of appetite  · nausea, vomiting  · stomach upset  · trouble sleeping  · trouble swallowing  · weak or tired  What may interact with this medicine?  · certain antibiotics given by injection  · NSAIDs, medicines for pain and inflammation, like ibuprofen or naproxen  · some diuretics like bumetanide,  furosemide  · teriparatide  What if I miss a dose?  It is important not to miss your dose. Call your doctor or health care professional if you are unable to keep an appointment.  Where should I keep my medicine?  This drug is given in a hospital or clinic and will not be stored at home.  What should I tell my health care provider before I take this medicine?  They need to know if you have any of these conditions:  · aspirin-sensitive asthma  · cancer, especially if you are receiving medicines used to treat cancer  · dental disease or wear dentures  · infection  · kidney disease  · low levels of calcium in the blood  · past surgery on the parathyroid gland or intestines  · receiving corticosteroids like dexamethasone or prednisone  · an unusual or allergic reaction to zoledronic acid, other medicines, foods, dyes, or preservatives  · pregnant or trying to get pregnant  · breast-feeding  What should I watch for while using this medicine?  Visit your doctor or health care professional for regular checkups. It may be some time before you see the benefit from this medicine. Do not stop taking your medicine unless your doctor tells you to. Your doctor may order blood tests or other tests to see how you are doing.  Women should inform their doctor if they wish to become pregnant or think they might be pregnant. There is a potential for serious side effects to an unborn child. Talk to your health care professional or pharmacist for more information.  You should make sure that you get enough calcium and vitamin D while you are taking this medicine. Discuss the foods you eat and the vitamins you take with your health care professional.  Some people who take this medicine have severe bone, joint, and/or muscle pain. This medicine may also increase your risk for jaw problems or a broken thigh bone. Tell your doctor right away if you have severe pain in your jaw, bones, joints, or muscles. Tell your doctor if you have any pain  that does not go away or that gets worse.  Tell your dentist and dental surgeon that you are taking this medicine. You should not have major dental surgery while on this medicine. See your dentist to have a dental exam and fix any dental problems before starting this medicine. Take good care of your teeth while on this medicine. Make sure you see your dentist for regular follow-up appointments.  NOTE:This sheet is a summary. It may not cover all possible information. If you have questions about this medicine, talk to your doctor, pharmacist, or health care provider. Copyright© 2017 [employer]

## 2020-09-14 NOTE — PROGRESS NOTES
Subjective:       Patient ID: Shannon Solano is a 80 y.o. female.    Chief Complaint: Osteopenia    Shannon is an 81 y/o f referred by Dr. Du to discuss her low bone density seen on dexa, here with her daughter.  She has never been tx for osteoporosis before. She takes daily vit D and calcium supplement.  Walks w/o assistance.   She reports history of a leg fx after a fall years ago tx with a cast, doesn't remember which leg or where.        Current meds for osteopenia/ osteoporosis: none  Prior meds for osteopenia/ osteoporosis: none  Prev dexa scan: none  Vit D supplement: daily otc   Calcium: no supplement not much in diet   Smoker/tobacco: no  Falls: 2 months ago   Activity level: minimal exercise  Kidney problems :  no   Prev fracture : years ago, leg, can't remember which one   PPI/gerd: no  Other risk factors :  neuropathy   Dental issues:  top dentures  Anticipated dental work :  none        Past Medical History:   Diagnosis Date    Anemia     Arthritis     Cataract     Disorder of kidney and ureter     Hyperlipidemia     Hypertension     Insomnia 8/28/2019    Osteoarthritis     Polyneuropathy     Stroke      Past Surgical History:   Procedure Laterality Date    APPENDECTOMY      CATARACT EXTRACTION Bilateral 2014    EYE SURGERY Bilateral     HYSTERECTOMY      VARICOSE VEIN SURGERY       Family History   Problem Relation Age of Onset    Breast cancer Sister     Heart disease Sister     Colon cancer Sister         age 70    Cancer Sister         breast    Obesity Mother     Stroke Mother     Obesity Brother     Alzheimer's disease Brother     Cancer Brother     No Known Problems Sister     Hypertension Daughter     Melanoma Neg Hx      Social History     Socioeconomic History    Marital status:      Spouse name: Not on file    Number of children: 2    Years of education: Not on file    Highest education level: Not on file   Occupational History    Not on file  "  Social Needs    Financial resource strain: Not on file    Food insecurity     Worry: Not on file     Inability: Not on file    Transportation needs     Medical: Not on file     Non-medical: Not on file   Tobacco Use    Smoking status: Never Smoker    Smokeless tobacco: Never Used   Substance and Sexual Activity    Alcohol use: No    Drug use: No    Sexual activity: Not Currently   Lifestyle    Physical activity     Days per week: Not on file     Minutes per session: Not on file    Stress: Not on file   Relationships    Social connections     Talks on phone: Not on file     Gets together: Not on file     Attends Confucianist service: Not on file     Active member of club or organization: Not on file     Attends meetings of clubs or organizations: Not on file     Relationship status: Not on file   Other Topics Concern    Are you pregnant or think you may be? No    Breast-feeding No   Social History Narrative    Not on file     Review of patient's allergies indicates:  No Known Allergies  Review of Systems   Constitutional: Negative for chills, fatigue and fever.   HENT: Negative for mouth sores, rhinorrhea and sore throat.    Eyes: Negative for pain and redness.   Respiratory: Negative for cough and shortness of breath.    Cardiovascular: Negative for chest pain.   Gastrointestinal: Negative for abdominal pain, constipation, diarrhea, nausea and vomiting.   Genitourinary: Negative for dysuria and hematuria.   Musculoskeletal: Positive for arthralgias. Negative for joint swelling and myalgias.   Skin: Negative for rash.   Neurological: Positive for numbness. Negative for weakness and headaches.        H/o stroke   Psychiatric/Behavioral: The patient is not nervous/anxious.          Objective:   /70   Pulse 80   Ht 5' 6" (1.676 m)   Wt 68 kg (149 lb 14.6 oz)   BMI 24.20 kg/m²   Immunization History   Administered Date(s) Administered    Influenza - High Dose - PF (65 years and older) 11/29/2011, " 10/18/2012, 10/13/2014, 10/06/2017, 10/16/2018, 10/29/2019    Influenza - Quadrivalent - High Dose - PF (65 years and older) 08/31/2020    Influenza - Trivalent (ADULT) 11/17/2006, 10/11/2007, 12/19/2008, 10/25/2010    Influenza Split 11/17/2006, 10/11/2007, 12/19/2008, 10/25/2010    Pneumococcal Conjugate - 13 Valent 04/21/2015    Pneumococcal Polysaccharide - 23 Valent 02/03/2014              Physical Exam   Constitutional: She is oriented to person, place, and time and well-developed, well-nourished, and in no distress.   HENT:   Head: Normocephalic and atraumatic.   Eyes: Pupils are equal, round, and reactive to light. Right eye exhibits no discharge.   Neck: Normal range of motion.   Cardiovascular: Normal rate, regular rhythm and normal heart sounds.  Exam reveals no friction rub.    Pulmonary/Chest: Effort normal and breath sounds normal. No respiratory distress.   Abdominal: Soft. She exhibits no distension. There is no abdominal tenderness.   Lymphadenopathy:     She has no cervical adenopathy.   Neurological: She is alert and oriented to person, place, and time.   Skin: No rash noted. No erythema.     Psychiatric: Mood normal.   Musculoskeletal: Normal range of motion. No deformity or edema.      Comments: Walks w/o assitance, kyphotic posture     Able to get out of chair independently    Gait slightly unsteady            No results found for this or any previous visit (from the past 336 hour(s)).     No results found for: TBGOLDPLUS   No results found for: HAV, HEPAIGM, HEPBIGM, HEPBCAB, HBEAG, HEPCAB   dexa 9/2020: spine -1.9, LN -1.5, TH -1.9, frax major 13.9% hip 3.7%  Assessment:       1. At risk for falls    2. Osteopenia, unspecified location          Impression:   Osteopenia with high risk of fx:  H/o fx but unclear (lower leg?); daily ca and vit d; not on med for bone density     CKD 3, mild, last gfr normal range but prev in 45-50's  Plan:       Discussed osteoporosis/low bone density  disease state in detail with patient.  Reviewed treatment options along with potential side effects of these treatments.  Dexa scan was reviewed with patient.  Treatment plan agreed upon together with patient     Due to her polypharmacy and concern for noncompliance w oral drug will start reclast IV infusion 5mg once yearly     New drug discussed with patient including SE, drug info printed and given to patient    Cont  vit D daily 1000u   cont ca from all sources 1200mg  Encouraged WB activity     Labs today: vit D, cmp,     Send to PT for gait training, balance, fall prevention, linx pt in [facility]     Get reclast then see back in 7 wks   dexa scan in a year to monitor therapy effectiveness then q 2 years   Consider drug holiday after 3 reclast infusions     Thank you for the consult!    Chart sent to infusion

## 2020-09-16 ENCOUNTER — TELEPHONE (OUTPATIENT)
Dept: INFUSION THERAPY | Facility: HOSPITAL | Age: 80
End: 2020-09-16

## 2020-09-21 ENCOUNTER — TELEPHONE (OUTPATIENT)
Dept: INFUSION THERAPY | Facility: HOSPITAL | Age: 80
End: 2020-09-21

## 2020-09-22 ENCOUNTER — TELEPHONE (OUTPATIENT)
Dept: NEUROLOGY | Facility: CLINIC | Age: 80
End: 2020-09-22

## 2020-09-22 NOTE — TELEPHONE ENCOUNTER
Spoke with daughter about mother falling on Sunday, Advised her mom needs to be seen to be Evaluated in the ER for the fall she stated that mom is on and is now seeing PT for the fall . She is having numbness and want to know if she can add an additional Gabapentin to the 400 mg that's taken daily .

## 2020-09-24 ENCOUNTER — TELEPHONE (OUTPATIENT)
Dept: RHEUMATOLOGY | Facility: CLINIC | Age: 80
End: 2020-09-24

## 2020-09-24 NOTE — TELEPHONE ENCOUNTER
----- Message from Jenny Goff sent at 9/24/2020  9:03 AM CDT -----  Contact: Sussy/daughter  Pt daughter called in regarding getting more information about her mother's infusion on Monday. Please call her back at [phone]    Thanks,  Pb

## 2020-09-24 NOTE — TELEPHONE ENCOUNTER
Spoke with pt daughter, would like more info on reclast infusion pt is scheduled to get 9.28.20, states she saw a commercial about it and is now nervous, advised her infusion is once a year, mild side affects- flu like symptoms, joint/muscle aches, tiredness, nausea, Verbalized understanding, would like to talk with Acacia downs regarding risk of cancer, please advise.

## 2020-09-28 ENCOUNTER — INFUSION (OUTPATIENT)
Dept: INFUSION THERAPY | Facility: HOSPITAL | Age: 80
End: 2020-09-28
Attending: INTERNAL MEDICINE
Payer: MEDICARE

## 2020-09-28 VITALS
RESPIRATION RATE: 17 BRPM | WEIGHT: 144.38 LBS | TEMPERATURE: 98 F | DIASTOLIC BLOOD PRESSURE: 69 MMHG | SYSTOLIC BLOOD PRESSURE: 162 MMHG | OXYGEN SATURATION: 96 % | HEART RATE: 59 BPM | BODY MASS INDEX: 23.3 KG/M2

## 2020-09-28 DIAGNOSIS — Z91.81 AT RISK FOR FALLS: ICD-10-CM

## 2020-09-28 DIAGNOSIS — M85.80 OSTEOPENIA, UNSPECIFIED LOCATION: Primary | ICD-10-CM

## 2020-09-28 PROCEDURE — 25000003 PHARM REV CODE 250: Mod: HCNC | Performed by: PHYSICIAN ASSISTANT

## 2020-09-28 PROCEDURE — 63600175 PHARM REV CODE 636 W HCPCS: Mod: HCNC | Performed by: PHYSICIAN ASSISTANT

## 2020-09-28 PROCEDURE — 96365 THER/PROPH/DIAG IV INF INIT: CPT | Mod: HCNC

## 2020-09-28 RX ORDER — ACETAMINOPHEN 325 MG/1
650 TABLET ORAL ONCE
Status: COMPLETED | OUTPATIENT
Start: 2020-09-28 | End: 2020-09-28

## 2020-09-28 RX ORDER — ZOLEDRONIC ACID 5 MG/100ML
5 INJECTION, SOLUTION INTRAVENOUS
Status: CANCELLED | OUTPATIENT
Start: 2021-09-27

## 2020-09-28 RX ORDER — ZOLEDRONIC ACID 5 MG/100ML
5 INJECTION, SOLUTION INTRAVENOUS
Status: COMPLETED | OUTPATIENT
Start: 2020-09-28 | End: 2020-09-28

## 2020-09-28 RX ORDER — SODIUM CHLORIDE 0.9 % (FLUSH) 0.9 %
10 SYRINGE (ML) INJECTION
Status: CANCELLED | OUTPATIENT
Start: 2021-09-27

## 2020-09-28 RX ORDER — HEPARIN 100 UNIT/ML
500 SYRINGE INTRAVENOUS
Status: CANCELLED | OUTPATIENT
Start: 2021-09-27

## 2020-09-28 RX ORDER — ACETAMINOPHEN 325 MG/1
650 TABLET ORAL ONCE
Status: CANCELLED
Start: 2021-09-27

## 2020-09-28 RX ADMIN — ACETAMINOPHEN 650 MG: 325 TABLET ORAL at 09:09

## 2020-09-28 RX ADMIN — ZOLEDRONIC ACID 5 MG: 0.05 INJECTION, SOLUTION INTRAVENOUS at 10:09

## 2020-09-28 NOTE — PLAN OF CARE
Patient tolerated infusion well. Reviewed S&S of reaction. Handout given. Patient verbalized understanding.

## 2020-09-29 ENCOUNTER — PATIENT MESSAGE (OUTPATIENT)
Dept: OTHER | Facility: OTHER | Age: 80
End: 2020-09-29

## 2020-10-14 ENCOUNTER — OFFICE VISIT (OUTPATIENT)
Dept: SPORTS MEDICINE | Facility: CLINIC | Age: 80
End: 2020-10-14
Payer: MEDICARE

## 2020-10-14 VITALS
BODY MASS INDEX: 23.2 KG/M2 | WEIGHT: 144.38 LBS | DIASTOLIC BLOOD PRESSURE: 90 MMHG | HEIGHT: 66 IN | SYSTOLIC BLOOD PRESSURE: 140 MMHG | HEART RATE: 60 BPM

## 2020-10-14 DIAGNOSIS — S46.911D STRAIN OF RIGHT SHOULDER, SUBSEQUENT ENCOUNTER: Primary | ICD-10-CM

## 2020-10-14 PROBLEM — S46.911A STRAIN OF RIGHT SHOULDER: Status: ACTIVE | Noted: 2020-10-14

## 2020-10-14 PROCEDURE — 99213 OFFICE O/P EST LOW 20 MIN: CPT | Mod: HCNC,S$GLB,, | Performed by: FAMILY MEDICINE

## 2020-10-14 PROCEDURE — 1125F PR PAIN SEVERITY QUANTIFIED, PAIN PRESENT: ICD-10-PCS | Mod: HCNC,S$GLB,, | Performed by: FAMILY MEDICINE

## 2020-10-14 PROCEDURE — 99213 PR OFFICE/OUTPT VISIT, EST, LEVL III, 20-29 MIN: ICD-10-PCS | Mod: HCNC,S$GLB,, | Performed by: FAMILY MEDICINE

## 2020-10-14 PROCEDURE — 3080F PR MOST RECENT DIASTOLIC BLOOD PRESSURE >= 90 MM HG: ICD-10-PCS | Mod: HCNC,CPTII,S$GLB, | Performed by: FAMILY MEDICINE

## 2020-10-14 PROCEDURE — 3077F SYST BP >= 140 MM HG: CPT | Mod: HCNC,CPTII,S$GLB, | Performed by: FAMILY MEDICINE

## 2020-10-14 PROCEDURE — 1159F MED LIST DOCD IN RCRD: CPT | Mod: HCNC,S$GLB,, | Performed by: FAMILY MEDICINE

## 2020-10-14 PROCEDURE — 1101F PT FALLS ASSESS-DOCD LE1/YR: CPT | Mod: HCNC,CPTII,S$GLB, | Performed by: FAMILY MEDICINE

## 2020-10-14 PROCEDURE — 1125F AMNT PAIN NOTED PAIN PRSNT: CPT | Mod: HCNC,S$GLB,, | Performed by: FAMILY MEDICINE

## 2020-10-14 PROCEDURE — 3077F PR MOST RECENT SYSTOLIC BLOOD PRESSURE >= 140 MM HG: ICD-10-PCS | Mod: HCNC,CPTII,S$GLB, | Performed by: FAMILY MEDICINE

## 2020-10-14 PROCEDURE — 99999 PR PBB SHADOW E&M-EST. PATIENT-LVL III: ICD-10-PCS | Mod: PBBFAC,HCNC,, | Performed by: FAMILY MEDICINE

## 2020-10-14 PROCEDURE — 1159F PR MEDICATION LIST DOCUMENTED IN MEDICAL RECORD: ICD-10-PCS | Mod: HCNC,S$GLB,, | Performed by: FAMILY MEDICINE

## 2020-10-14 PROCEDURE — 99999 PR PBB SHADOW E&M-EST. PATIENT-LVL III: CPT | Mod: PBBFAC,HCNC,, | Performed by: FAMILY MEDICINE

## 2020-10-14 PROCEDURE — 1101F PR PT FALLS ASSESS DOC 0-1 FALLS W/OUT INJ PAST YR: ICD-10-PCS | Mod: HCNC,CPTII,S$GLB, | Performed by: FAMILY MEDICINE

## 2020-10-14 PROCEDURE — 3080F DIAST BP >= 90 MM HG: CPT | Mod: HCNC,CPTII,S$GLB, | Performed by: FAMILY MEDICINE

## 2020-10-19 ENCOUNTER — OFFICE VISIT (OUTPATIENT)
Dept: FAMILY MEDICINE | Facility: CLINIC | Age: 80
End: 2020-10-19
Payer: MEDICARE

## 2020-10-19 ENCOUNTER — HOSPITAL ENCOUNTER (OUTPATIENT)
Dept: RADIOLOGY | Facility: HOSPITAL | Age: 80
Discharge: HOME | End: 2020-10-19
Attending: FAMILY MEDICINE
Payer: MEDICARE

## 2020-10-19 VITALS
BODY MASS INDEX: 24.43 KG/M2 | TEMPERATURE: 99 F | DIASTOLIC BLOOD PRESSURE: 70 MMHG | WEIGHT: 152 LBS | RESPIRATION RATE: 16 BRPM | SYSTOLIC BLOOD PRESSURE: 138 MMHG | OXYGEN SATURATION: 96 % | HEIGHT: 66 IN | HEART RATE: 73 BPM

## 2020-10-19 DIAGNOSIS — M25.572 LEFT ANKLE PAIN, UNSPECIFIED CHRONICITY: ICD-10-CM

## 2020-10-19 DIAGNOSIS — Z86.73 HISTORY OF CVA IN ADULTHOOD: ICD-10-CM

## 2020-10-19 DIAGNOSIS — S99.922A INJURY OF LEFT FOOT, INITIAL ENCOUNTER: ICD-10-CM

## 2020-10-19 DIAGNOSIS — L03.116 CELLULITIS OF LEFT FOOT: ICD-10-CM

## 2020-10-19 DIAGNOSIS — S99.912A INJURY OF LEFT ANKLE, INITIAL ENCOUNTER: Primary | ICD-10-CM

## 2020-10-19 DIAGNOSIS — L03.116 CELLULITIS OF LEFT ANKLE: ICD-10-CM

## 2020-10-19 DIAGNOSIS — G62.9 POLYNEUROPATHY: ICD-10-CM

## 2020-10-19 DIAGNOSIS — M79.672 LEFT FOOT PAIN: ICD-10-CM

## 2020-10-19 PROCEDURE — 99999 PR PBB SHADOW E&M-EST. PATIENT-LVL V: ICD-10-PCS | Mod: PBBFAC,HCNC,, | Performed by: FAMILY MEDICINE

## 2020-10-19 PROCEDURE — 73630 X-RAY EXAM OF FOOT: CPT | Mod: TC,HCNC,PO,LT

## 2020-10-19 PROCEDURE — 1125F AMNT PAIN NOTED PAIN PRSNT: CPT | Mod: HCNC,S$GLB,, | Performed by: FAMILY MEDICINE

## 2020-10-19 PROCEDURE — 1101F PR PT FALLS ASSESS DOC 0-1 FALLS W/OUT INJ PAST YR: ICD-10-PCS | Mod: HCNC,CPTII,S$GLB, | Performed by: FAMILY MEDICINE

## 2020-10-19 PROCEDURE — 3078F PR MOST RECENT DIASTOLIC BLOOD PRESSURE < 80 MM HG: ICD-10-PCS | Mod: HCNC,CPTII,S$GLB, | Performed by: FAMILY MEDICINE

## 2020-10-19 PROCEDURE — 1159F PR MEDICATION LIST DOCUMENTED IN MEDICAL RECORD: ICD-10-PCS | Mod: HCNC,S$GLB,, | Performed by: FAMILY MEDICINE

## 2020-10-19 PROCEDURE — 99214 OFFICE O/P EST MOD 30 MIN: CPT | Mod: HCNC,25,S$GLB, | Performed by: FAMILY MEDICINE

## 2020-10-19 PROCEDURE — 1125F PR PAIN SEVERITY QUANTIFIED, PAIN PRESENT: ICD-10-PCS | Mod: HCNC,S$GLB,, | Performed by: FAMILY MEDICINE

## 2020-10-19 PROCEDURE — 73610 X-RAY EXAM OF ANKLE: CPT | Mod: 26,HCNC,LT, | Performed by: RADIOLOGY

## 2020-10-19 PROCEDURE — 73610 XR ANKLE COMPLETE 3 VIEW LEFT: ICD-10-PCS | Mod: 26,HCNC,LT, | Performed by: RADIOLOGY

## 2020-10-19 PROCEDURE — 3078F DIAST BP <80 MM HG: CPT | Mod: HCNC,CPTII,S$GLB, | Performed by: FAMILY MEDICINE

## 2020-10-19 PROCEDURE — 73610 X-RAY EXAM OF ANKLE: CPT | Mod: TC,HCNC,PO,LT

## 2020-10-19 PROCEDURE — 3075F SYST BP GE 130 - 139MM HG: CPT | Mod: HCNC,CPTII,S$GLB, | Performed by: FAMILY MEDICINE

## 2020-10-19 PROCEDURE — 73630 XR FOOT COMPLETE 3 VIEW LEFT: ICD-10-PCS | Mod: 26,HCNC,LT, | Performed by: RADIOLOGY

## 2020-10-19 PROCEDURE — 1159F MED LIST DOCD IN RCRD: CPT | Mod: HCNC,S$GLB,, | Performed by: FAMILY MEDICINE

## 2020-10-19 PROCEDURE — 90471 TDAP VACCINE GREATER THAN OR EQUAL TO 7YO IM: ICD-10-PCS | Mod: HCNC,AT,S$GLB, | Performed by: FAMILY MEDICINE

## 2020-10-19 PROCEDURE — 90471 IMMUNIZATION ADMIN: CPT | Mod: HCNC,AT,S$GLB, | Performed by: FAMILY MEDICINE

## 2020-10-19 PROCEDURE — 73630 X-RAY EXAM OF FOOT: CPT | Mod: 26,HCNC,LT, | Performed by: RADIOLOGY

## 2020-10-19 PROCEDURE — 90715 TDAP VACCINE 7 YRS/> IM: CPT | Mod: HCNC,AT,S$GLB, | Performed by: FAMILY MEDICINE

## 2020-10-19 PROCEDURE — 99214 PR OFFICE/OUTPT VISIT, EST, LEVL IV, 30-39 MIN: ICD-10-PCS | Mod: HCNC,25,S$GLB, | Performed by: FAMILY MEDICINE

## 2020-10-19 PROCEDURE — 90715 TDAP VACCINE GREATER THAN OR EQUAL TO 7YO IM: ICD-10-PCS | Mod: HCNC,AT,S$GLB, | Performed by: FAMILY MEDICINE

## 2020-10-19 PROCEDURE — 3075F PR MOST RECENT SYSTOLIC BLOOD PRESS GE 130-139MM HG: ICD-10-PCS | Mod: HCNC,CPTII,S$GLB, | Performed by: FAMILY MEDICINE

## 2020-10-19 PROCEDURE — 99999 PR PBB SHADOW E&M-EST. PATIENT-LVL V: CPT | Mod: PBBFAC,HCNC,, | Performed by: FAMILY MEDICINE

## 2020-10-19 PROCEDURE — 1101F PT FALLS ASSESS-DOCD LE1/YR: CPT | Mod: HCNC,CPTII,S$GLB, | Performed by: FAMILY MEDICINE

## 2020-10-19 RX ORDER — CLINDAMYCIN HYDROCHLORIDE 300 MG/1
300 CAPSULE ORAL 3 TIMES DAILY
Qty: 21 CAPSULE | Refills: 0 | Status: SHIPPED | OUTPATIENT
Start: 2020-10-19 | End: 2021-04-14

## 2020-10-19 RX ORDER — MUPIROCIN 20 MG/G
OINTMENT TOPICAL 3 TIMES DAILY
Qty: 30 G | Refills: 0 | Status: SHIPPED | OUTPATIENT
Start: 2020-10-19 | End: 2020-12-29

## 2020-10-19 NOTE — PROGRESS NOTES
"Subjective:       Patient ID: Shannon Solano is a 80 y.o. female.    Chief Complaint: Leg Injury    HPI   Leg Injury  81yo female presents today accompanied by her daughter with report of an injury sustained outside of her home on Friday 10/16/2020. She was walking and notes that she stepped on a stick which in turn "stabbed me in the foot". There was significant bleeding according to the patient. She now notes redness and pain. Her daughter describes seeing purulent appearing drainage. Date of last tetanus immunization is unknown. Patient has been applying Neosporin. Daughter notes the wound was cleansed once with peroxide but patient has refused further wound care due to pain. She rates pain at 4/10 currently.    Review of Systems   Constitutional: Negative for chills, fatigue and fever.   Eyes: Negative for visual disturbance.   Respiratory: Negative for cough and shortness of breath.    Cardiovascular: Negative for chest pain, palpitations and leg swelling.   Gastrointestinal: Negative for abdominal pain, diarrhea, nausea and vomiting.   Genitourinary: Negative for decreased urine volume and difficulty urinating.   Musculoskeletal: Positive for arthralgias. Negative for myalgias.   Skin: Positive for wound. Negative for rash.   Neurological: Negative for weakness and numbness.   Psychiatric/Behavioral: Negative for dysphoric mood and sleep disturbance.       Objective:   /70   Pulse 73   Temp 98.8 °F (37.1 °C) (Temporal)   Resp 16   Ht 5' 6" (1.676 m)   Wt 68.9 kg (152 lb 0.1 oz)   SpO2 96%   BMI 24.53 kg/m²   Physical Exam  Constitutional:       Appearance: Normal appearance. She is not ill-appearing or toxic-appearing.   HENT:      Head: Normocephalic and atraumatic.      Right Ear: External ear normal.      Left Ear: External ear normal.      Nose: Nose normal.      Mouth/Throat:      Mouth: Mucous membranes are moist.   Eyes:      Extraocular Movements: Extraocular movements intact.      " Conjunctiva/sclera: Conjunctivae normal.      Pupils: Pupils are equal, round, and reactive to light.   Neck:      Musculoskeletal: Normal range of motion and neck supple.   Cardiovascular:      Rate and Rhythm: Normal rate and regular rhythm.      Heart sounds: No murmur.   Pulmonary:      Effort: Pulmonary effort is normal.      Breath sounds: Normal breath sounds.   Abdominal:      General: Bowel sounds are normal.      Palpations: Abdomen is soft.      Tenderness: There is no abdominal tenderness.   Skin:     General: Skin is warm and dry.      Findings: Erythema and signs of injury present.          Neurological:      Mental Status: She is alert.   Psychiatric:         Mood and Affect: Mood normal.         Behavior: Behavior normal.         Assessment:       1. Injury of left ankle, initial encounter    2. Injury of left foot, initial encounter    3. Cellulitis of left ankle    4. Cellulitis of left foot    5. Left ankle pain, unspecified chronicity    6. Left foot pain    7. Polyneuropathy    8. History of CVA in adulthood        Plan:      Injury of left ankle, initial encounter  -     Ambulatory referral/consult to Home Health; Future; Expected date: 10/26/2020    Injury of left foot, initial encounter  -     X-Ray Foot Complete 3 view Left; Future; Expected date: 10/19/2020  -     (In Office Administered) Tdap Vaccine  -     clindamycin (CLEOCIN) 300 MG capsule; Take 1 capsule (300 mg total) by mouth 3 (three) times daily.  Dispense: 21 capsule; Refill: 0    Cellulitis of left ankle  -     Ambulatory referral/consult to Home Health; Future; Expected date: 10/26/2020    Cellulitis of left foot  -     clindamycin (CLEOCIN) 300 MG capsule; Take 1 capsule (300 mg total) by mouth 3 (three) times daily.  Dispense: 21 capsule; Refill: 0  -     mupirocin (BACTROBAN) 2 % ointment; Apply topically 3 (three) times daily.  Dispense: 30 g; Refill: 0    Left ankle pain, unspecified chronicity  -     X-Ray Ankle Complete  3 View Left; Future; Expected date: 10/19/2020    Left foot pain  -     X-Ray Foot Complete 3 view Left; Future; Expected date: 10/19/2020  -     clindamycin (CLEOCIN) 300 MG capsule; Take 1 capsule (300 mg total) by mouth 3 (three) times daily.  Dispense: 21 capsule; Refill: 0    Polyneuropathy  -     Ambulatory referral/consult to Home Health; Future; Expected date: 10/26/2020    History of CVA in adulthood    Attempts at cleaning the wound were intolerable for the patient. Tdap administered in office today. No foreign body noted on xray. Clindamycin and topical Bactroban initiated. Will request wound care via HH and short interval follow-up with PCP. Reviewed w/s and reasons to seek immediate reassessment should symptoms evolve.    See media tab for photo of injury.

## 2020-10-20 ENCOUNTER — TELEPHONE (OUTPATIENT)
Dept: FAMILY MEDICINE | Facility: CLINIC | Age: 80
End: 2020-10-20

## 2020-10-20 ENCOUNTER — HOSPITAL ENCOUNTER (OUTPATIENT)
Dept: RADIOLOGY | Facility: HOSPITAL | Age: 80
Discharge: HOME | End: 2020-10-20
Attending: FAMILY MEDICINE
Payer: MEDICARE

## 2020-10-20 ENCOUNTER — OFFICE VISIT (OUTPATIENT)
Dept: FAMILY MEDICINE | Facility: CLINIC | Age: 80
End: 2020-10-20
Attending: FAMILY MEDICINE
Payer: MEDICARE

## 2020-10-20 VITALS
WEIGHT: 151.25 LBS | TEMPERATURE: 99 F | HEART RATE: 82 BPM | HEIGHT: 66 IN | DIASTOLIC BLOOD PRESSURE: 72 MMHG | SYSTOLIC BLOOD PRESSURE: 126 MMHG | OXYGEN SATURATION: 95 % | BODY MASS INDEX: 24.31 KG/M2

## 2020-10-20 DIAGNOSIS — L03.90 CELLULITIS, UNSPECIFIED CELLULITIS SITE: Primary | ICD-10-CM

## 2020-10-20 DIAGNOSIS — L03.90 CELLULITIS, UNSPECIFIED CELLULITIS SITE: ICD-10-CM

## 2020-10-20 DIAGNOSIS — M70.872 OTHER SOFT TISSUE DISORDERS RELATED TO USE, OVERUSE AND PRESSURE, LEFT ANKLE AND FOOT: ICD-10-CM

## 2020-10-20 PROCEDURE — 76882 US LMTD JT/FCL EVL NVASC XTR: CPT | Mod: TC,HCNC,LT

## 2020-10-20 PROCEDURE — 99214 OFFICE O/P EST MOD 30 MIN: CPT | Mod: HCNC,S$GLB,, | Performed by: FAMILY MEDICINE

## 2020-10-20 PROCEDURE — 76882 US SOFT TISSUE, LOWER EXTREMITY, LEFT: ICD-10-PCS | Mod: 26,HCNC,LT, | Performed by: RADIOLOGY

## 2020-10-20 PROCEDURE — 99214 PR OFFICE/OUTPT VISIT, EST, LEVL IV, 30-39 MIN: ICD-10-PCS | Mod: HCNC,S$GLB,, | Performed by: FAMILY MEDICINE

## 2020-10-20 PROCEDURE — G0180 PR HOME HEALTH MD CERTIFICATION: ICD-10-PCS | Mod: ,,, | Performed by: FAMILY MEDICINE

## 2020-10-20 PROCEDURE — 3078F DIAST BP <80 MM HG: CPT | Mod: HCNC,CPTII,S$GLB, | Performed by: FAMILY MEDICINE

## 2020-10-20 PROCEDURE — 1159F PR MEDICATION LIST DOCUMENTED IN MEDICAL RECORD: ICD-10-PCS | Mod: HCNC,S$GLB,, | Performed by: FAMILY MEDICINE

## 2020-10-20 PROCEDURE — 1101F PT FALLS ASSESS-DOCD LE1/YR: CPT | Mod: HCNC,CPTII,S$GLB, | Performed by: FAMILY MEDICINE

## 2020-10-20 PROCEDURE — 1125F AMNT PAIN NOTED PAIN PRSNT: CPT | Mod: HCNC,S$GLB,, | Performed by: FAMILY MEDICINE

## 2020-10-20 PROCEDURE — 3078F PR MOST RECENT DIASTOLIC BLOOD PRESSURE < 80 MM HG: ICD-10-PCS | Mod: HCNC,CPTII,S$GLB, | Performed by: FAMILY MEDICINE

## 2020-10-20 PROCEDURE — 1125F PR PAIN SEVERITY QUANTIFIED, PAIN PRESENT: ICD-10-PCS | Mod: HCNC,S$GLB,, | Performed by: FAMILY MEDICINE

## 2020-10-20 PROCEDURE — 3074F PR MOST RECENT SYSTOLIC BLOOD PRESSURE < 130 MM HG: ICD-10-PCS | Mod: HCNC,CPTII,S$GLB, | Performed by: FAMILY MEDICINE

## 2020-10-20 PROCEDURE — 76882 US LMTD JT/FCL EVL NVASC XTR: CPT | Mod: 26,HCNC,LT, | Performed by: RADIOLOGY

## 2020-10-20 PROCEDURE — 3074F SYST BP LT 130 MM HG: CPT | Mod: HCNC,CPTII,S$GLB, | Performed by: FAMILY MEDICINE

## 2020-10-20 PROCEDURE — 99999 PR PBB SHADOW E&M-EST. PATIENT-LVL IV: ICD-10-PCS | Mod: PBBFAC,HCNC,, | Performed by: FAMILY MEDICINE

## 2020-10-20 PROCEDURE — 1101F PR PT FALLS ASSESS DOC 0-1 FALLS W/OUT INJ PAST YR: ICD-10-PCS | Mod: HCNC,CPTII,S$GLB, | Performed by: FAMILY MEDICINE

## 2020-10-20 PROCEDURE — 99999 PR PBB SHADOW E&M-EST. PATIENT-LVL IV: CPT | Mod: PBBFAC,HCNC,, | Performed by: FAMILY MEDICINE

## 2020-10-20 PROCEDURE — 1159F MED LIST DOCD IN RCRD: CPT | Mod: HCNC,S$GLB,, | Performed by: FAMILY MEDICINE

## 2020-10-20 PROCEDURE — G0180 MD CERTIFICATION HHA PATIENT: HCPCS | Mod: ,,, | Performed by: FAMILY MEDICINE

## 2020-10-20 NOTE — PROGRESS NOTES
Subjective:       Patient ID: Shannon Solano is a 80 y.o. female.    Chief Complaint: Follow-up (wound on left foot)    80 y old female with venous insufficiency f.u today on Left ankle injury / cellulitis , seen by Dr Hood yesterday . Pain is about the same . Taking clindamycin . Not elevation LE , nor keeping wound covered . Will have home health nurse visit today .     Review of Systems   Constitutional: Negative.    HENT: Negative.    Eyes: Negative.    Respiratory: Negative.    Cardiovascular: Negative.    Gastrointestinal: Negative.    Genitourinary: Negative.    Musculoskeletal: Positive for joint swelling.   Skin: Positive for color change and wound.   Hematological: Negative.        Objective:      Physical Exam  Constitutional:       General: She is not in acute distress.     Appearance: She is well-developed. She is not diaphoretic.   HENT:      Head: Normocephalic and atraumatic.      Right Ear: External ear normal.      Left Ear: External ear normal.      Mouth/Throat:      Pharynx: No oropharyngeal exudate.   Eyes:      General: No scleral icterus.        Right eye: No discharge.         Left eye: No discharge.      Conjunctiva/sclera: Conjunctivae normal.      Pupils: Pupils are equal, round, and reactive to light.   Neck:      Musculoskeletal: Normal range of motion and neck supple.      Thyroid: No thyromegaly.      Vascular: No JVD.      Trachea: No tracheal deviation.   Cardiovascular:      Rate and Rhythm: Normal rate and regular rhythm.      Heart sounds: Normal heart sounds. No murmur. No friction rub. No gallop.    Pulmonary:      Effort: Pulmonary effort is normal. No respiratory distress.      Breath sounds: Normal breath sounds. No stridor. No wheezing or rales.   Chest:      Chest wall: No tenderness.   Abdominal:      General: Bowel sounds are normal. There is no distension.      Palpations: Abdomen is soft.      Tenderness: There is no abdominal tenderness. There is no guarding or  rebound.   Musculoskeletal: Normal range of motion.      Left ankle: She exhibits swelling.        Feet:    Lymphadenopathy:      Cervical: No cervical adenopathy.   Skin:     General: Skin is warm and dry.      Findings: Signs of injury present.   Neurological:      Mental Status: She is alert and oriented to person, place, and time.   Psychiatric:         Behavior: Behavior normal.         Thought Content: Thought content normal.         Judgment: Judgment normal.         Assessment:       1. Cellulitis, unspecified cellulitis site    2. Other soft tissue disorders related to use, overuse and pressure, left ankle and foot         Plan:     Shannon was seen today for follow-up.    Diagnoses and all orders for this visit:    Cellulitis, unspecified cellulitis site  -     US Soft Tissue, Lower Extremity, Left; Future  -     CBC auto differential; Future  -     Comprehensive Metabolic Panel; Future  -     C-Reactive Protein; Future    Other soft tissue disorders related to use, overuse and pressure, left ankle and foot   -     US Soft Tissue, Lower Extremity, Left; Future     Keep Leg elevated, keep wound covered  .   Will r/u abscess .   WS discussed. See media tab for images

## 2020-10-21 ENCOUNTER — TELEPHONE (OUTPATIENT)
Dept: FAMILY MEDICINE | Facility: CLINIC | Age: 80
End: 2020-10-21

## 2020-10-21 DIAGNOSIS — L02.91 ABSCESS: Primary | ICD-10-CM

## 2020-10-21 NOTE — TELEPHONE ENCOUNTER
Spoke to patient's daughter, informed her that Dr. Spann (Podiatry) can see patient tomorrow at [facility] or Friday at [facility]. She prefers [facility]. Scheduled pt tomorrow at 11 am with Dr. Spann. She verbalized understanding.

## 2020-10-22 ENCOUNTER — APPOINTMENT (OUTPATIENT)
Dept: LAB | Facility: HOSPITAL | Age: 80
End: 2020-10-22
Attending: PODIATRIST
Payer: MEDICARE

## 2020-10-22 ENCOUNTER — OFFICE VISIT (OUTPATIENT)
Dept: PODIATRY | Facility: CLINIC | Age: 80
End: 2020-10-22
Payer: MEDICARE

## 2020-10-22 VITALS
BODY MASS INDEX: 24.24 KG/M2 | HEART RATE: 65 BPM | WEIGHT: 150.81 LBS | HEIGHT: 66 IN | SYSTOLIC BLOOD PRESSURE: 148 MMHG | DIASTOLIC BLOOD PRESSURE: 72 MMHG

## 2020-10-22 DIAGNOSIS — I87.2 VENOUS INSUFFICIENCY OF BOTH LOWER EXTREMITIES: ICD-10-CM

## 2020-10-22 DIAGNOSIS — M25.572 PAIN AND SWELLING OF LEFT ANKLE: ICD-10-CM

## 2020-10-22 DIAGNOSIS — L02.91 ABSCESS: Primary | ICD-10-CM

## 2020-10-22 DIAGNOSIS — L97.322 ULCER OF LEFT ANKLE, WITH FAT LAYER EXPOSED: ICD-10-CM

## 2020-10-22 DIAGNOSIS — M25.472 PAIN AND SWELLING OF LEFT ANKLE: ICD-10-CM

## 2020-10-22 PROCEDURE — 99999 PR PBB SHADOW E&M-EST. PATIENT-LVL IV: CPT | Mod: PBBFAC,HCNC,, | Performed by: PODIATRIST

## 2020-10-22 PROCEDURE — 1101F PR PT FALLS ASSESS DOC 0-1 FALLS W/OUT INJ PAST YR: ICD-10-PCS | Mod: HCNC,CPTII,S$GLB, | Performed by: PODIATRIST

## 2020-10-22 PROCEDURE — 3077F SYST BP >= 140 MM HG: CPT | Mod: HCNC,CPTII,S$GLB, | Performed by: PODIATRIST

## 2020-10-22 PROCEDURE — 99499 RISK ADDL DX/OHS AUDIT: ICD-10-PCS | Mod: S$GLB,,, | Performed by: PODIATRIST

## 2020-10-22 PROCEDURE — 99499 UNLISTED E&M SERVICE: CPT | Mod: S$GLB,,, | Performed by: PODIATRIST

## 2020-10-22 PROCEDURE — 99203 OFFICE O/P NEW LOW 30 MIN: CPT | Mod: 25,HCNC,S$GLB, | Performed by: PODIATRIST

## 2020-10-22 PROCEDURE — 1159F PR MEDICATION LIST DOCUMENTED IN MEDICAL RECORD: ICD-10-PCS | Mod: HCNC,S$GLB,, | Performed by: PODIATRIST

## 2020-10-22 PROCEDURE — 99999 PR PBB SHADOW E&M-EST. PATIENT-LVL IV: ICD-10-PCS | Mod: PBBFAC,HCNC,, | Performed by: PODIATRIST

## 2020-10-22 PROCEDURE — 87070 CULTURE OTHR SPECIMN AEROBIC: CPT | Mod: HCNC

## 2020-10-22 PROCEDURE — 1101F PT FALLS ASSESS-DOCD LE1/YR: CPT | Mod: HCNC,CPTII,S$GLB, | Performed by: PODIATRIST

## 2020-10-22 PROCEDURE — 3078F DIAST BP <80 MM HG: CPT | Mod: HCNC,CPTII,S$GLB, | Performed by: PODIATRIST

## 2020-10-22 PROCEDURE — 1126F AMNT PAIN NOTED NONE PRSNT: CPT | Mod: HCNC,S$GLB,, | Performed by: PODIATRIST

## 2020-10-22 PROCEDURE — 3077F PR MOST RECENT SYSTOLIC BLOOD PRESSURE >= 140 MM HG: ICD-10-PCS | Mod: HCNC,CPTII,S$GLB, | Performed by: PODIATRIST

## 2020-10-22 PROCEDURE — 99203 PR OFFICE/OUTPT VISIT, NEW, LEVL III, 30-44 MIN: ICD-10-PCS | Mod: 25,HCNC,S$GLB, | Performed by: PODIATRIST

## 2020-10-22 PROCEDURE — 1126F PR PAIN SEVERITY QUANTIFIED, NO PAIN PRESENT: ICD-10-PCS | Mod: HCNC,S$GLB,, | Performed by: PODIATRIST

## 2020-10-22 PROCEDURE — 10061 I&D ABSCESS COMP/MULTIPLE: CPT | Mod: HCNC,S$GLB,, | Performed by: PODIATRIST

## 2020-10-22 PROCEDURE — 10061 PR DRAIN SKIN ABSCESS COMPLIC: ICD-10-PCS | Mod: HCNC,S$GLB,, | Performed by: PODIATRIST

## 2020-10-22 PROCEDURE — 3078F PR MOST RECENT DIASTOLIC BLOOD PRESSURE < 80 MM HG: ICD-10-PCS | Mod: HCNC,CPTII,S$GLB, | Performed by: PODIATRIST

## 2020-10-22 PROCEDURE — 1159F MED LIST DOCD IN RCRD: CPT | Mod: HCNC,S$GLB,, | Performed by: PODIATRIST

## 2020-10-22 RX ORDER — HYDROCODONE BITARTRATE AND ACETAMINOPHEN 5; 325 MG/1; MG/1
1 TABLET ORAL EVERY 8 HOURS PRN
Qty: 21 TABLET | Refills: 0 | Status: SHIPPED | OUTPATIENT
Start: 2020-10-22 | End: 2020-10-29

## 2020-10-22 NOTE — PROGRESS NOTES
Subjective:       Patient ID: Shannon Solano is a 80 y.o. female.    Chief Complaint: Wound Check (l. foot 0/10 slip ons non diabetic pt pcp Dr. Esparza.)      HPI: Shannon Solano presents to the clinic today, for evaluation and treatment concerning an ulceration/wound/bulla(e) to the anterior left lower leg. Patient's Primary Care Provider is Leslie Poe MD. The PMHx. does include venous insufficiency , PVD, history of CVA w/ residual deficits, peripheral neuropathy, venous insufficiency and acquired foot/ankle deformity/deformities. The wound(s) have/has been present for several days. Most recent local wound care w/ are dry dressings. She has seen her PCP for the aforementioned. Xrays are negative. SONO was positive for abscess. Pains are 10/10. She state redness without drainage. She is on Clindamycin.    Hemoglobin A1C   Date Value Ref Range Status   02/14/2020 5.7 (H) 4.0 - 5.6 % Final     Comment:     ADA Screening Guidelines:  5.7-6.4%  Consistent with prediabetes  >or=6.5%  Consistent with diabetes  High levels of fetal hemoglobin interfere with the HbA1C  assay. Heterozygous hemoglobin variants (HbS, HgC, etc)do  not significantly interfere with this assay.   However, presence of multiple variants may affect accuracy.     09/12/2019 5.6 4.0 - 5.6 % Final     Comment:     ADA Screening Guidelines:  5.7-6.4%  Consistent with prediabetes  >or=6.5%  Consistent with diabetes  High levels of fetal hemoglobin interfere with the HbA1C  assay. Heterozygous hemoglobin variants (HbS, HgC, etc)do  not significantly interfere with this assay.   However, presence of multiple variants may affect accuracy.     07/06/2017 5.6 4.0 - 5.6 % Final     Comment:     According to ADA guidelines, hemoglobin A1c <7.0% represents  optimal control in non-pregnant diabetic patients. Different  metrics may apply to specific patient populations.   Standards of Medical Care in Diabetes-2016.  For the purpose of screening  for the presence of diabetes:  <5.7%     Consistent with the absence of diabetes  5.7-6.4%  Consistent with increasing risk for diabetes   (prediabetes)  >or=6.5%  Consistent with diabetes  Currently, no consensus exists for use of hemoglobin A1c  for diagnosis of diabetes for children.  This Hemoglobin A1c assay has significant interference with fetal   hemoglobin   (HbF). The results are invalid for patients with abnormal amounts of   HbF,   including those with known Hereditary Persistence   of Fetal Hemoglobin. Heterozygous hemoglobin variants (HbAS, HbAC,   HbAD, HbAE, HbA2) do not significantly interfere with this assay;   however, presence of multiple variants in a sample may impact the %   interference.         Review of patient's allergies indicates:  No Known Allergies    Past Medical History:   Diagnosis Date    Anemia     Arthritis     Cataract     Disorder of kidney and ureter     Hyperlipidemia     Hypertension     Insomnia 8/28/2019    Osteoarthritis     Polyneuropathy     Stroke        Family History   Problem Relation Age of Onset    Breast cancer Sister     Heart disease Sister     Colon cancer Sister         age 70    Cancer Sister         breast    Obesity Mother     Stroke Mother     Obesity Brother     Alzheimer's disease Brother     Cancer Brother     No Known Problems Sister     Hypertension Daughter     Melanoma Neg Hx        Social History     Socioeconomic History    Marital status:      Spouse name: Not on file    Number of children: 2    Years of education: Not on file    Highest education level: Not on file   Occupational History    Not on file   Social Needs    Financial resource strain: Not on file    Food insecurity     Worry: Not on file     Inability: Not on file    Transportation needs     Medical: Not on file     Non-medical: Not on file   Tobacco Use    Smoking status: Never Smoker    Smokeless tobacco: Never Used   Substance and Sexual  "Activity    Alcohol use: No    Drug use: No    Sexual activity: Not Currently   Lifestyle    Physical activity     Days per week: Not on file     Minutes per session: Not on file    Stress: Not on file   Relationships    Social connections     Talks on phone: Not on file     Gets together: Not on file     Attends Christianity service: Not on file     Active member of club or organization: Not on file     Attends meetings of clubs or organizations: Not on file     Relationship status: Not on file   Other Topics Concern    Are you pregnant or think you may be? No    Breast-feeding No   Social History Narrative    Not on file       Past Surgical History:   Procedure Laterality Date    APPENDECTOMY      CATARACT EXTRACTION Bilateral 2014    EYE SURGERY Bilateral     HYSTERECTOMY      VARICOSE VEIN SURGERY         Review of Systems   Constitutional: Negative for chills, fatigue and fever.   HENT: Negative for hearing loss.    Eyes: Negative for photophobia and visual disturbance.   Respiratory: Negative for cough, chest tightness, shortness of breath and wheezing.    Cardiovascular: Positive for leg swelling. Negative for chest pain and palpitations.   Gastrointestinal: Negative for constipation, diarrhea, nausea and vomiting.   Endocrine: Negative for cold intolerance and heat intolerance.   Genitourinary: Negative for flank pain.   Musculoskeletal: Positive for gait problem. Negative for neck pain and neck stiffness.   Skin: Positive for color change and wound.   Neurological: Negative for light-headedness and headaches.   Psychiatric/Behavioral: Negative for sleep disturbance.          Objective:   BP (!) 148/72   Pulse 65   Ht 5' 6" (1.676 m)   Wt 68.4 kg (150 lb 12.7 oz)   BMI 24.34 kg/m²                       LOWER EXTREMITY PHYSICAL EXAMINATION  NEUROLOGY: Sensation to light touch is intact. Proprioception is intact. Sensation to pin prick is intact.    ORTHOPEDIC: Manual Muscle Testing is 5/5 in " all planes on the left, without pains, with and without resistance.  Discomfort to palpation area pathology the left anterior ankle joint.  Mild edema is noted.  Gait pattern is antalgic.    VASCULAR: On the left and right foot, the dorsalis pedis pulse is 2/4 and the posterior tibial pulse is 1/4. Capillary refill time is less than 3 seconds. Hair growth is present on the dorsum of the foot and at the digits. No rubor is present. Proximal to distal temperature is warm to warm.  Edema is noted to the bilateral lower extremity.  Varicosities and telangiectasia noted.    DERMATOLOGY:  There is eschar formation/ulcerations anterior aspect left lower leg.  The area measures 1.20 cm x 1.50 cm without appreciable depth.  Upon debridement, copious hematoma is noted without purulence. The resultant wound measures 2.0cm x 2.0cm x 5cm.  Erythema is noted.  Slight malodor is noted.  Probe to bone or osseous exposure.    Assessment:     1. Abscess    2. Pain and swelling of left ankle    3. Venous insufficiency of both lower extremities        Plan:     Abscess  Pain and swelling of left ankle  Thorough discussion is had with the patient today, concerning the diagnosis, its etiology, and the treatment algorithm at present.  XRAYS are reviewed in detail with the patient. All questions and concerns regarding findings and its/their implications are outlined and discussed.  Soft Tissue SONO is reviewed in detail with the patient. All questions and concerns regarding findings and its/their implications are outlined and discussed.      A TIME-OUT was completed. The area of pathology was incised and drained to the level of muscle and/or fascia, after adequate prep with alcohol and/or betadine paint. The procedure was performed using sharp #10/#15 blade, followed by either a tissue nipper or a hemostat to probe the wound deep, to allow for further evacuation of abscess/hematoma/fluid. Post debridement measurements are as above.  Hemostasis was achieved. Patient tolerated procedure well and without complications. Local woundcare with collangen dressings and bandage thereafter. ACE wrap application.     Patient will continue [facility], but will transition to Q3-Q4 days collagen dressings.      Venous insufficiency of both lower extremities  Recommend initiation/continuation of compression therapy to the lower extremity         Future Appointments   Date Time Provider Department Center   11/2/2020 11:30 AM Acacia Boland PA-C ON [facility]   11/16/2020 12:40 PM Roberto Montes MD ON [facility]

## 2020-10-22 NOTE — Clinical Note
All taken care of. I&D with debridement. [facility] will transition to Q3/Q4 collagen dressings to the wound. Pending C&S as well. F/U with me next Friday.

## 2020-10-22 NOTE — LETTER
October 22, 2020      Leslie Poe MD  [address] [facility] [address]           [place] [facility]  [address] [facility] [address]  Phone: [phone]  Fax: [phone]          Patient: Shannon Solano   MR Number: [id number]   YOB: 1940   Date of Visit: 10/22/2020       Dear Dr. Leslie Poe:    Thank you for referring Shannon Solano to me for evaluation. Attached you will find relevant portions of my assessment and plan of care.    If you have questions, please do not hesitate to call me. I look forward to following Shannon Solano along with you.    Sincerely,    Melchor Spann, YVON    Enclosure  CC:  No Recipients    If you would like to receive this communication electronically, please contact [email or web address] or [phone] to request more information on [facility] access.    For providers and/or their staff who would like to refer a patient to [facility], please contact us through our one-stop-shop provider referral line, [facility], at [phone].    If you feel you have received this communication in error or would no longer like to receive these types of communications, please e-mail [email or web address]

## 2020-10-26 LAB — BACTERIA SPEC AEROBE CULT: NO GROWTH

## 2020-10-27 ENCOUNTER — DOCUMENT SCAN (OUTPATIENT)
Dept: HOME HEALTH SERVICES | Facility: HOSPITAL | Age: 80
End: 2020-10-27
Payer: MEDICARE

## 2020-10-28 ENCOUNTER — EXTERNAL HOME HEALTH (OUTPATIENT)
Dept: HOME HEALTH SERVICES | Facility: HOSPITAL | Age: 80
End: 2020-10-28
Payer: MEDICARE

## 2020-10-28 RX ORDER — ATORVASTATIN CALCIUM 40 MG/1
40 TABLET, FILM COATED ORAL DAILY
Qty: 90 TABLET | Refills: 3 | Status: SHIPPED | OUTPATIENT
Start: 2020-10-28 | End: 2021-07-28

## 2020-10-28 RX ORDER — GABAPENTIN 400 MG/1
400 CAPSULE ORAL NIGHTLY
Qty: 90 CAPSULE | Refills: 0 | Status: SHIPPED | OUTPATIENT
Start: 2020-10-28 | End: 2021-02-01

## 2020-10-28 NOTE — TELEPHONE ENCOUNTER
----- Message from Margoth Phillips sent at 10/28/2020 10:13 AM CDT -----  Regarding: refill  Contact: didier Vela  1. What is the name of the medication you are requesting? Gabapentin  2. What is the dose? 300mg  3. How do you take the medication? Orally, topically, etc? .  4. How often do you take this medication? Once a day  5. Do you need a 30 day or 90 day supply? .  6. How many refills are you requesting? .  7. What is your preferred pharmacy and location of the pharmacy? [facility]  8. Who can we contact with further questions? [phone]    1. What is the name of the medication you are requesting? Folbic  2. What is the dose? .  3. How do you take the medication? Orally, topically, etc? .  4. How often do you take this medication? .  5. Do you need a 30 day or 90 day supply? .  6. How many refills are you requesting? .  7. What is your preferred pharmacy and location of the pharmacy? .  8. Who can we contact with further questions? .    1. What is the name of the medication you are requesting? Atorvastatin  2. What is the dose? 40mg  3. How do you take the medication? Orally, topically, etc? .  4. How often do you take this medication? .  5. Do you need a 30 day or 90 day supply? .  6. How many refills are you requesting? .  7. What is your preferred pharmacy and location of the pharmacy? .  8. Who can we contact with further questions? .

## 2020-10-30 ENCOUNTER — OFFICE VISIT (OUTPATIENT)
Dept: PODIATRY | Facility: CLINIC | Age: 80
End: 2020-10-30
Payer: MEDICARE

## 2020-10-30 VITALS
DIASTOLIC BLOOD PRESSURE: 79 MMHG | HEIGHT: 66 IN | WEIGHT: 150.69 LBS | SYSTOLIC BLOOD PRESSURE: 154 MMHG | HEART RATE: 76 BPM | BODY MASS INDEX: 24.22 KG/M2

## 2020-10-30 DIAGNOSIS — L97.322 ULCER OF LEFT ANKLE, WITH FAT LAYER EXPOSED: Primary | ICD-10-CM

## 2020-10-30 DIAGNOSIS — M25.472 PAIN AND SWELLING OF LEFT ANKLE: ICD-10-CM

## 2020-10-30 DIAGNOSIS — M25.572 PAIN AND SWELLING OF LEFT ANKLE: ICD-10-CM

## 2020-10-30 DIAGNOSIS — I87.2 VENOUS INSUFFICIENCY OF BOTH LOWER EXTREMITIES: ICD-10-CM

## 2020-10-30 PROCEDURE — 99024 POSTOP FOLLOW-UP VISIT: CPT | Mod: HCNC,S$GLB,, | Performed by: PODIATRIST

## 2020-10-30 PROCEDURE — 1101F PR PT FALLS ASSESS DOC 0-1 FALLS W/OUT INJ PAST YR: ICD-10-PCS | Mod: HCNC,CPTII,S$GLB, | Performed by: PODIATRIST

## 2020-10-30 PROCEDURE — 1126F PR PAIN SEVERITY QUANTIFIED, NO PAIN PRESENT: ICD-10-PCS | Mod: HCNC,S$GLB,, | Performed by: PODIATRIST

## 2020-10-30 PROCEDURE — 3078F PR MOST RECENT DIASTOLIC BLOOD PRESSURE < 80 MM HG: ICD-10-PCS | Mod: HCNC,CPTII,S$GLB, | Performed by: PODIATRIST

## 2020-10-30 PROCEDURE — 99999 PR PBB SHADOW E&M-EST. PATIENT-LVL IV: CPT | Mod: PBBFAC,HCNC,, | Performed by: PODIATRIST

## 2020-10-30 PROCEDURE — 1126F AMNT PAIN NOTED NONE PRSNT: CPT | Mod: HCNC,S$GLB,, | Performed by: PODIATRIST

## 2020-10-30 PROCEDURE — 3077F SYST BP >= 140 MM HG: CPT | Mod: HCNC,CPTII,S$GLB, | Performed by: PODIATRIST

## 2020-10-30 PROCEDURE — 3077F PR MOST RECENT SYSTOLIC BLOOD PRESSURE >= 140 MM HG: ICD-10-PCS | Mod: HCNC,CPTII,S$GLB, | Performed by: PODIATRIST

## 2020-10-30 PROCEDURE — 3078F DIAST BP <80 MM HG: CPT | Mod: HCNC,CPTII,S$GLB, | Performed by: PODIATRIST

## 2020-10-30 PROCEDURE — 1159F PR MEDICATION LIST DOCUMENTED IN MEDICAL RECORD: ICD-10-PCS | Mod: HCNC,S$GLB,, | Performed by: PODIATRIST

## 2020-10-30 PROCEDURE — 99999 PR PBB SHADOW E&M-EST. PATIENT-LVL IV: ICD-10-PCS | Mod: PBBFAC,HCNC,, | Performed by: PODIATRIST

## 2020-10-30 PROCEDURE — 99024 PR POST-OP FOLLOW-UP VISIT: ICD-10-PCS | Mod: HCNC,S$GLB,, | Performed by: PODIATRIST

## 2020-10-30 PROCEDURE — 1159F MED LIST DOCD IN RCRD: CPT | Mod: HCNC,S$GLB,, | Performed by: PODIATRIST

## 2020-10-30 PROCEDURE — 1101F PT FALLS ASSESS-DOCD LE1/YR: CPT | Mod: HCNC,CPTII,S$GLB, | Performed by: PODIATRIST

## 2020-10-30 RX ORDER — TRAMADOL HYDROCHLORIDE 50 MG/1
TABLET ORAL
Qty: 28 TABLET | Refills: 0 | Status: SHIPPED | OUTPATIENT
Start: 2020-10-30 | End: 2020-12-29

## 2020-10-30 NOTE — PROGRESS NOTES
Subjective:       Patient ID: Shannon Solano is a 80 y.o. female.    Chief Complaint: Wound Check (L. foot 0/10 slip ons non diabetic pt pcp Dr. Esparza.)      HPI: Shannon Solano presents to the clinic today, for evaluation and treatment concerning an ulceration/wound/bulla(e) to the anterior aspect left lower leg. Patient's Primary Care Provider is Leslie Poe MD. The PMHx. does include CKD and venous insufficiency. The wound(s) have/has been present for a week or so. Most recent local wound care w/ are collagen dressings. Patient does have home health services. Home Health Agency: [facility].  Patient presents this afternoon with her daughter.    Hemoglobin A1C   Date Value Ref Range Status   02/14/2020 5.7 (H) 4.0 - 5.6 % Final     Comment:     ADA Screening Guidelines:  5.7-6.4%  Consistent with prediabetes  >or=6.5%  Consistent with diabetes  High levels of fetal hemoglobin interfere with the HbA1C  assay. Heterozygous hemoglobin variants (HbS, HgC, etc)do  not significantly interfere with this assay.   However, presence of multiple variants may affect accuracy.     09/12/2019 5.6 4.0 - 5.6 % Final     Comment:     ADA Screening Guidelines:  5.7-6.4%  Consistent with prediabetes  >or=6.5%  Consistent with diabetes  High levels of fetal hemoglobin interfere with the HbA1C  assay. Heterozygous hemoglobin variants (HbS, HgC, etc)do  not significantly interfere with this assay.   However, presence of multiple variants may affect accuracy.     07/06/2017 5.6 4.0 - 5.6 % Final     Comment:     According to ADA guidelines, hemoglobin A1c <7.0% represents  optimal control in non-pregnant diabetic patients. Different  metrics may apply to specific patient populations.   Standards of Medical Care in Diabetes-2016.  For the purpose of screening for the presence of diabetes:  <5.7%     Consistent with the absence of diabetes  5.7-6.4%  Consistent with increasing risk for diabetes   (prediabetes)  >or=6.5%   Consistent with diabetes  Currently, no consensus exists for use of hemoglobin A1c  for diagnosis of diabetes for children.  This Hemoglobin A1c assay has significant interference with fetal   hemoglobin   (HbF). The results are invalid for patients with abnormal amounts of   HbF,   including those with known Hereditary Persistence   of Fetal Hemoglobin. Heterozygous hemoglobin variants (HbAS, HbAC,   HbAD, HbAE, HbA2) do not significantly interfere with this assay;   however, presence of multiple variants in a sample may impact the %   interference.         Review of patient's allergies indicates:  No Known Allergies    Past Medical History:   Diagnosis Date    Anemia     Arthritis     Cataract     Disorder of kidney and ureter     Hyperlipidemia     Hypertension     Insomnia 8/28/2019    Osteoarthritis     Polyneuropathy     Stroke        Family History   Problem Relation Age of Onset    Breast cancer Sister     Heart disease Sister     Colon cancer Sister         age 70    Cancer Sister         breast    Obesity Mother     Stroke Mother     Obesity Brother     Alzheimer's disease Brother     Cancer Brother     No Known Problems Sister     Hypertension Daughter     Melanoma Neg Hx        Social History     Socioeconomic History    Marital status:      Spouse name: Not on file    Number of children: 2    Years of education: Not on file    Highest education level: Not on file   Occupational History    Not on file   Social Needs    Financial resource strain: Not on file    Food insecurity     Worry: Not on file     Inability: Not on file    Transportation needs     Medical: Not on file     Non-medical: Not on file   Tobacco Use    Smoking status: Never Smoker    Smokeless tobacco: Never Used   Substance and Sexual Activity    Alcohol use: No    Drug use: No    Sexual activity: Not Currently   Lifestyle    Physical activity     Days per week: Not on file     Minutes per  "session: Not on file    Stress: Not on file   Relationships    Social connections     Talks on phone: Not on file     Gets together: Not on file     Attends Anabaptism service: Not on file     Active member of club or organization: Not on file     Attends meetings of clubs or organizations: Not on file     Relationship status: Not on file   Other Topics Concern    Are you pregnant or think you may be? No    Breast-feeding No   Social History Narrative    Not on file       Past Surgical History:   Procedure Laterality Date    APPENDECTOMY      CATARACT EXTRACTION Bilateral 2014    EYE SURGERY Bilateral     HYSTERECTOMY      VARICOSE VEIN SURGERY         Review of Systems   Constitutional: Negative for chills, fatigue and fever.   HENT: Negative for hearing loss.    Eyes: Negative for photophobia and visual disturbance.   Respiratory: Negative for cough, chest tightness, shortness of breath and wheezing.    Cardiovascular: Positive for leg swelling. Negative for chest pain and palpitations.   Gastrointestinal: Negative for constipation, diarrhea, nausea and vomiting.   Endocrine: Negative for cold intolerance and heat intolerance.   Genitourinary: Negative for flank pain.   Musculoskeletal: Positive for arthralgias, gait problem, joint swelling and myalgias. Negative for neck pain and neck stiffness.   Skin: Positive for color change and wound.   Neurological: Negative for light-headedness and headaches.   Psychiatric/Behavioral: Negative for sleep disturbance.          Objective:   BP (!) 154/79   Pulse 76   Ht 5' 6" (1.676 m)   Wt 68.3 kg (150 lb 11 oz)   BMI 24.32 kg/m²                   LOWER EXTREMITY PHYSICAL EXAMINATION  ORTHOPEDIC: Manual Muscle Testing is 5/5 in all planes on the left, without pains, with and without resistance.  Discomfort to palpation area pathology the left anterior ankle joint.  Mild edema is noted.     NEUROLOGY: Sensation to light touch is intact. Proprioception is intact. " Sensation to pin prick is intact.     VASCULAR: On the left and right foot, the dorsalis pedis pulse is 2/4 and the posterior tibial pulse is 1/4. Capillary refill time is less than 3 seconds. Hair growth is present on the dorsum of the foot and at the digits. Proximal to distal temperature is warm to warm.  Edema is noted to the bilateral lower extremity.  Varicosities and telangiectasia are noted.    DERMATOLOGY:  The ulceration anterior aspect of the left lower leg measures similar as compared to prior approximately 1.80 cm x 2.0 cm with a depth of 0.40 cm.  Granular tissues are noted.  No probe to bone or osseous exposure.  No malodor or drainage.    Assessment:     1. Ulcer of left ankle, with fat layer exposed    2. Venous insufficiency of both lower extremities    3. Pain and swelling of left ankle        Plan:     Ulcer of left ankle, with fat layer exposed    Venous insufficiency of both lower extremities    Pain and swelling of left ankle  -     traMADoL (ULTRAM) 50 mg tablet; Take 1 to 2 tablets by mouth, every 6 hours as needed for pain relief.  Dispense: 28 tablet; Refill: 0        Thorough discussion is had with the patient today, concerning the diagnosis, its etiology, and the treatment algorithm at present.    No local signs of infection at this time.  No repeat wound culture.    The wound was surgically debrided after adequate prep with alcohol and/or betadine paint. Excisional wound debridement was performed using sharp #10/#15 blade/rounded scalpel and tissue nipper, with removal of all non-viable skin and soft tissues; necrotic skin/tissue formation. The woundbase/wound bed was also debrided to encourage bleeding as to promote/stimulate healing. Debridement was excisional and included epidermal, dermal and subcutaneous tissues. Post debridement measurements are as above. Hemostasis was achieved. Patient tolerated procedure well and without complications. Local woundcare with collagen dressings  and bandage thereafter. Patient will change the collagen dressings Q3 - Q4 days in standard fashion.    Will order/set of/authorize WoundVac application from [employer]. Referral/Order is placed.     For now, continue [facility] for collagen based dressings.  Compression therapy. Would recommend ambulation with CAM Walker or Aircast Boot.      Pulses are palpable to the lower extremity, but if no symptomatic improvement upon follow-up, will consider JEFF/PVR.        Future Appointments   Date Time Provider Department Center   11/16/2020 12:40 PM Roberto Montes MD ON CARDIO  [facility]

## 2020-11-02 ENCOUNTER — TELEPHONE (OUTPATIENT)
Dept: RHEUMATOLOGY | Facility: CLINIC | Age: 80
End: 2020-11-02

## 2020-11-02 ENCOUNTER — DOCUMENT SCAN (OUTPATIENT)
Dept: HOME HEALTH SERVICES | Facility: HOSPITAL | Age: 80
End: 2020-11-02
Payer: MEDICARE

## 2020-11-02 NOTE — TELEPHONE ENCOUNTER
Spoke with pt daughter, called to r/s cxl appt today with [facility], r/sd to 12.7.20 at 11am at [facility], stated pt had surgery on right leg for abscess, Verbalized understanding.

## 2020-11-09 ENCOUNTER — TELEPHONE (OUTPATIENT)
Dept: PODIATRY | Facility: CLINIC | Age: 80
End: 2020-11-09

## 2020-11-09 NOTE — TELEPHONE ENCOUNTER
Spoke with daughter and appointment was made for 11/17/2020 at 9:15am. Call ended pleasantly.     ----- Message from Crystal Weber sent at 11/9/2020  3:31 PM CST -----  Regarding: appt clarity  Good afternoon,      Pt daughter called to see when pt is supposed to come back to office due to wound can be reached at [phone]      Thanks,  Crystal Weber

## 2020-11-11 ENCOUNTER — DOCUMENT SCAN (OUTPATIENT)
Dept: HOME HEALTH SERVICES | Facility: HOSPITAL | Age: 80
End: 2020-11-11
Payer: MEDICARE

## 2020-11-16 ENCOUNTER — OFFICE VISIT (OUTPATIENT)
Dept: CARDIOLOGY | Facility: CLINIC | Age: 80
End: 2020-11-16
Payer: MEDICARE

## 2020-11-16 VITALS
SYSTOLIC BLOOD PRESSURE: 142 MMHG | WEIGHT: 150.38 LBS | HEIGHT: 66 IN | BODY MASS INDEX: 24.17 KG/M2 | HEART RATE: 63 BPM | OXYGEN SATURATION: 96 % | DIASTOLIC BLOOD PRESSURE: 76 MMHG

## 2020-11-16 DIAGNOSIS — I65.23 BILATERAL CAROTID ARTERY STENOSIS: Chronic | ICD-10-CM

## 2020-11-16 DIAGNOSIS — I10 ESSENTIAL HYPERTENSION: Chronic | ICD-10-CM

## 2020-11-16 DIAGNOSIS — E78.5 DYSLIPIDEMIA: Chronic | ICD-10-CM

## 2020-11-16 DIAGNOSIS — I10 ESSENTIAL HYPERTENSION: Primary | Chronic | ICD-10-CM

## 2020-11-16 DIAGNOSIS — I95.1 ORTHOSTATIC HYPOTENSION: ICD-10-CM

## 2020-11-16 DIAGNOSIS — Z91.81 AT RISK FOR FALLS: Primary | ICD-10-CM

## 2020-11-16 DIAGNOSIS — Z86.73 HISTORY OF STROKE: Chronic | ICD-10-CM

## 2020-11-16 PROCEDURE — 3077F SYST BP >= 140 MM HG: CPT | Mod: HCNC,CPTII,S$GLB, | Performed by: INTERNAL MEDICINE

## 2020-11-16 PROCEDURE — 1159F PR MEDICATION LIST DOCUMENTED IN MEDICAL RECORD: ICD-10-PCS | Mod: HCNC,S$GLB,, | Performed by: INTERNAL MEDICINE

## 2020-11-16 PROCEDURE — 3078F DIAST BP <80 MM HG: CPT | Mod: HCNC,CPTII,S$GLB, | Performed by: INTERNAL MEDICINE

## 2020-11-16 PROCEDURE — 99214 OFFICE O/P EST MOD 30 MIN: CPT | Mod: HCNC,S$GLB,, | Performed by: INTERNAL MEDICINE

## 2020-11-16 PROCEDURE — 99999 PR PBB SHADOW E&M-EST. PATIENT-LVL IV: CPT | Mod: PBBFAC,HCNC,, | Performed by: INTERNAL MEDICINE

## 2020-11-16 PROCEDURE — 3078F PR MOST RECENT DIASTOLIC BLOOD PRESSURE < 80 MM HG: ICD-10-PCS | Mod: HCNC,CPTII,S$GLB, | Performed by: INTERNAL MEDICINE

## 2020-11-16 PROCEDURE — 1101F PR PT FALLS ASSESS DOC 0-1 FALLS W/OUT INJ PAST YR: ICD-10-PCS | Mod: HCNC,CPTII,S$GLB, | Performed by: INTERNAL MEDICINE

## 2020-11-16 PROCEDURE — 3077F PR MOST RECENT SYSTOLIC BLOOD PRESSURE >= 140 MM HG: ICD-10-PCS | Mod: HCNC,CPTII,S$GLB, | Performed by: INTERNAL MEDICINE

## 2020-11-16 PROCEDURE — 99214 PR OFFICE/OUTPT VISIT, EST, LEVL IV, 30-39 MIN: ICD-10-PCS | Mod: HCNC,S$GLB,, | Performed by: INTERNAL MEDICINE

## 2020-11-16 PROCEDURE — 3288F PR FALLS RISK ASSESSMENT DOCUMENTED: ICD-10-PCS | Mod: HCNC,CPTII,S$GLB, | Performed by: INTERNAL MEDICINE

## 2020-11-16 PROCEDURE — 3288F FALL RISK ASSESSMENT DOCD: CPT | Mod: HCNC,CPTII,S$GLB, | Performed by: INTERNAL MEDICINE

## 2020-11-16 PROCEDURE — 1101F PT FALLS ASSESS-DOCD LE1/YR: CPT | Mod: HCNC,CPTII,S$GLB, | Performed by: INTERNAL MEDICINE

## 2020-11-16 PROCEDURE — 99999 PR PBB SHADOW E&M-EST. PATIENT-LVL IV: ICD-10-PCS | Mod: PBBFAC,HCNC,, | Performed by: INTERNAL MEDICINE

## 2020-11-16 PROCEDURE — 1159F MED LIST DOCD IN RCRD: CPT | Mod: HCNC,S$GLB,, | Performed by: INTERNAL MEDICINE

## 2020-11-16 RX ORDER — IRBESARTAN 300 MG/1
300 TABLET ORAL DAILY
Qty: 90 TABLET | Refills: 3 | Status: SHIPPED | OUTPATIENT
Start: 2020-11-16

## 2020-11-16 RX ORDER — EVOLOCUMAB 140 MG/ML
140 INJECTION, SOLUTION SUBCUTANEOUS
Qty: 2 ML | Refills: 5 | Status: SHIPPED | OUTPATIENT
Start: 2020-11-16 | End: 2020-11-25

## 2020-11-16 RX ORDER — AMLODIPINE BESYLATE 2.5 MG/1
2.5 TABLET ORAL DAILY
Qty: 30 TABLET | Refills: 3 | Status: SHIPPED | OUTPATIENT
Start: 2020-11-16 | End: 2021-02-08

## 2020-11-16 RX ORDER — FLUDROCORTISONE ACETATE 0.1 MG/1
100 TABLET ORAL DAILY
Qty: 90 TABLET | Refills: 3 | Status: SHIPPED | OUTPATIENT
Start: 2020-11-16 | End: 2021-11-29 | Stop reason: SDUPTHER

## 2020-11-16 NOTE — PROGRESS NOTES
Subjective:   Patient ID:  Shannon Solano is a 80 y.o. female who presents for follow up of Hypertension      81 yo female, orthostatic hypotension f/u  PMH orthostatic hypotension,  HTN carotid A Dz, HTN, h/o CVA 3 yrs ago, CKD.  EKG NSR and non specfic stt change  Echo in 2018 normal EF and moderate MR  04/21/2020 ER visit for dizziness> SBP at 208 mmHG and positive orthostatic hypotension, dropped to 170 mmHG  Brain CT negative in   Carotid US in  mild Dz  No dehydration issue  Lives with her  and poor social support.    Today, states that dizziness occasionally, twice a week, No fall and faint  No chest pain, dyspnea and palpitation  Appetite good  BP high at home  In the office orthostatic sign negative      Past Medical History:   Diagnosis Date    Anemia     Arthritis     Cataract     Disorder of kidney and ureter     Hyperlipidemia     Hypertension     Insomnia 8/28/2019    Osteoarthritis     Polyneuropathy     Stroke        Past Surgical History:   Procedure Laterality Date    APPENDECTOMY      CATARACT EXTRACTION Bilateral 2014    EYE SURGERY Bilateral     HYSTERECTOMY      VARICOSE VEIN SURGERY         Social History     Tobacco Use    Smoking status: Never Smoker    Smokeless tobacco: Never Used   Substance Use Topics    Alcohol use: No    Drug use: No       Family History   Problem Relation Age of Onset    Breast cancer Sister     Heart disease Sister     Colon cancer Sister         age 70    Cancer Sister         breast    Obesity Mother     Stroke Mother     Obesity Brother     Alzheimer's disease Brother     Cancer Brother     No Known Problems Sister     Hypertension Daughter     Melanoma Neg Hx          Review of Systems   Constitution: Negative for decreased appetite, diaphoresis, fever, malaise/fatigue and night sweats.   HENT: Negative for nosebleeds.    Eyes: Negative for blurred vision and double vision.   Cardiovascular: Negative for chest  pain, claudication, dyspnea on exertion, irregular heartbeat, leg swelling, orthopnea, palpitations, paroxysmal nocturnal dyspnea and syncope.   Respiratory: Negative for cough, shortness of breath, sleep disturbances due to breathing, snoring, sputum production and wheezing.    Endocrine: Negative for cold intolerance and polyuria.   Hematologic/Lymphatic: Does not bruise/bleed easily.   Skin: Negative for rash.   Musculoskeletal: Negative for back pain, falls, joint pain, joint swelling and neck pain.   Gastrointestinal: Negative for abdominal pain, heartburn, nausea and vomiting.   Genitourinary: Negative for dysuria, frequency and hematuria.   Neurological: Positive for dizziness and light-headedness. Negative for difficulty with concentration, focal weakness, headaches, numbness, seizures and weakness.   Psychiatric/Behavioral: Positive for memory loss. Negative for depression and substance abuse. The patient does not have insomnia.    Allergic/Immunologic: Negative for HIV exposure and hives.       Objective:   Physical Exam   Constitutional: She is oriented to person, place, and time. She appears well-nourished.   HENT:   Head: Normocephalic.   Eyes: Pupils are equal, round, and reactive to light.   Neck: Normal carotid pulses and no JVD present. Carotid bruit is not present. No thyromegaly present.   Cardiovascular: Normal rate, regular rhythm, normal heart sounds and normal pulses.  No extrasystoles are present. PMI is not displaced. Exam reveals no gallop and no S3.   No murmur heard.  Pulmonary/Chest: Breath sounds normal. No stridor. No respiratory distress.   Abdominal: Soft. Bowel sounds are normal. There is no abdominal tenderness. There is no rebound.   Musculoskeletal: Normal range of motion.   Neurological: She is alert and oriented to person, place, and time.   Skin: Skin is intact. No rash noted.   Psychiatric: Her behavior is normal.       Lab Results   Component Value Date    CHOL 207 (H)  02/14/2020    CHOL 185 09/12/2019    CHOL 178 10/09/2017     Lab Results   Component Value Date    HDL 48 02/14/2020    HDL 38 (L) 09/12/2019    HDL 42 10/09/2017     Lab Results   Component Value Date    LDLCALC 139.6 02/14/2020    LDLCALC 110.8 09/12/2019    LDLCALC 103.2 10/09/2017     Lab Results   Component Value Date    TRIG 97 02/14/2020    TRIG 181 (H) 09/12/2019    TRIG 164 (H) 10/09/2017     Lab Results   Component Value Date    CHOLHDL 23.2 02/14/2020    CHOLHDL 20.5 09/12/2019    CHOLHDL 23.6 10/09/2017       Chemistry        Component Value Date/Time     10/20/2020 1155    K 4.0 10/20/2020 1155     10/20/2020 1155    CO2 30 (H) 10/20/2020 1155    BUN 20 10/20/2020 1155    CREATININE 1.0 10/20/2020 1155     10/20/2020 1155        Component Value Date/Time    CALCIUM 8.5 (L) 10/20/2020 1155    ALKPHOS 111 10/20/2020 1155    AST 26 10/20/2020 1155    ALT 23 10/20/2020 1155    BILITOT 0.7 10/20/2020 1155    ESTGFRAFRICA >60.0 10/20/2020 1155    EGFRNONAA 52.6 (A) 10/20/2020 1155          Lab Results   Component Value Date    HGBA1C 5.7 (H) 02/14/2020     Lab Results   Component Value Date    TSH 1.374 09/12/2019     Lab Results   Component Value Date    INR 1.0 04/21/2020    INR 1.0 09/13/2019    INR 1.0 09/12/2019     Lab Results   Component Value Date    WBC 9.36 10/20/2020    HGB 11.8 (L) 10/20/2020    HCT 38.1 10/20/2020    MCV 97 10/20/2020     10/20/2020     BMP  Sodium   Date Value Ref Range Status   10/20/2020 144 136 - 145 mmol/L Final     Potassium   Date Value Ref Range Status   10/20/2020 4.0 3.5 - 5.1 mmol/L Final     Chloride   Date Value Ref Range Status   10/20/2020 108 95 - 110 mmol/L Final     CO2   Date Value Ref Range Status   10/20/2020 30 (H) 23 - 29 mmol/L Final     BUN   Date Value Ref Range Status   10/20/2020 20 8 - 23 mg/dL Final     Creatinine   Date Value Ref Range Status   10/20/2020 1.0 0.5 - 1.4 mg/dL Final     Calcium   Date Value Ref Range Status    10/20/2020 8.5 (L) 8.7 - 10.5 mg/dL Final     Anion Gap   Date Value Ref Range Status   10/20/2020 6 (L) 8 - 16 mmol/L Final     eGFR if    Date Value Ref Range Status   10/20/2020 >60.0 >60 mL/min/1.73 m^2 Final     eGFR if non    Date Value Ref Range Status   10/20/2020 52.6 (A) >60 mL/min/1.73 m^2 Final     Comment:     Calculation used to obtain the estimated glomerular filtration  rate (eGFR) is the CKD-EPI equation.        BNP  @LABRCNTIP(BNP,BNPTRIAGEBLO)@  @LABRCNTIP(troponini)@  CrCl cannot be calculated (Patient's most recent lab result is older than the maximum 7 days allowed.).  No results found in the last 24 hours.  No results found in the last 24 hours.  No results found in the last 24 hours.    Assessment:      1. Essential hypertension    2. Dyslipidemia    3. Bilateral carotid artery stenosis    4. History of stroke 1/16/13 Rt basal ganglia    5. Orthostatic hypotension        Plan:   Continue Irbesartan and Florinef  Add Amlodipine 2.5 mg for HTN  Add Repatha   Fall precaution  RTC in 3 [facility]

## 2020-11-17 ENCOUNTER — DOCUMENT SCAN (OUTPATIENT)
Dept: HOME HEALTH SERVICES | Facility: HOSPITAL | Age: 80
End: 2020-11-17
Payer: MEDICARE

## 2020-11-17 ENCOUNTER — OFFICE VISIT (OUTPATIENT)
Dept: PODIATRY | Facility: CLINIC | Age: 80
End: 2020-11-17
Payer: MEDICARE

## 2020-11-17 VITALS
HEIGHT: 66 IN | SYSTOLIC BLOOD PRESSURE: 161 MMHG | WEIGHT: 151.25 LBS | HEART RATE: 64 BPM | BODY MASS INDEX: 24.31 KG/M2 | DIASTOLIC BLOOD PRESSURE: 72 MMHG

## 2020-11-17 DIAGNOSIS — L97.322 ULCER OF LEFT ANKLE, WITH FAT LAYER EXPOSED: Primary | ICD-10-CM

## 2020-11-17 DIAGNOSIS — I87.2 VENOUS INSUFFICIENCY OF BOTH LOWER EXTREMITIES: ICD-10-CM

## 2020-11-17 PROCEDURE — 99499 UNLISTED E&M SERVICE: CPT | Mod: HCNC,S$GLB,, | Performed by: PODIATRIST

## 2020-11-17 PROCEDURE — 99999 PR PBB SHADOW E&M-EST. PATIENT-LVL IV: ICD-10-PCS | Mod: PBBFAC,HCNC,, | Performed by: PODIATRIST

## 2020-11-17 PROCEDURE — 11042 PR DEBRIDEMENT, SKIN, SUB-Q TISSUE,=<20 SQ CM: ICD-10-PCS | Mod: HCNC,S$GLB,, | Performed by: PODIATRIST

## 2020-11-17 PROCEDURE — 99999 PR PBB SHADOW E&M-EST. PATIENT-LVL IV: CPT | Mod: PBBFAC,HCNC,, | Performed by: PODIATRIST

## 2020-11-17 PROCEDURE — 3288F PR FALLS RISK ASSESSMENT DOCUMENTED: ICD-10-PCS | Mod: HCNC,CPTII,S$GLB, | Performed by: PODIATRIST

## 2020-11-17 PROCEDURE — 1101F PR PT FALLS ASSESS DOC 0-1 FALLS W/OUT INJ PAST YR: ICD-10-PCS | Mod: HCNC,CPTII,S$GLB, | Performed by: PODIATRIST

## 2020-11-17 PROCEDURE — 3288F FALL RISK ASSESSMENT DOCD: CPT | Mod: HCNC,CPTII,S$GLB, | Performed by: PODIATRIST

## 2020-11-17 PROCEDURE — 11042 DBRDMT SUBQ TIS 1ST 20SQCM/<: CPT | Mod: HCNC,S$GLB,, | Performed by: PODIATRIST

## 2020-11-17 PROCEDURE — 1126F PR PAIN SEVERITY QUANTIFIED, NO PAIN PRESENT: ICD-10-PCS | Mod: HCNC,S$GLB,, | Performed by: PODIATRIST

## 2020-11-17 PROCEDURE — 1101F PT FALLS ASSESS-DOCD LE1/YR: CPT | Mod: HCNC,CPTII,S$GLB, | Performed by: PODIATRIST

## 2020-11-17 PROCEDURE — 1126F AMNT PAIN NOTED NONE PRSNT: CPT | Mod: HCNC,S$GLB,, | Performed by: PODIATRIST

## 2020-11-17 PROCEDURE — 99499 NO LOS: ICD-10-PCS | Mod: HCNC,S$GLB,, | Performed by: PODIATRIST

## 2020-11-25 ENCOUNTER — TELEPHONE (OUTPATIENT)
Dept: CARDIOLOGY | Facility: CLINIC | Age: 80
End: 2020-11-25

## 2020-11-25 DIAGNOSIS — E78.2 MIXED HYPERLIPIDEMIA: Primary | ICD-10-CM

## 2020-11-25 RX ORDER — EVOLOCUMAB 140 MG/ML
140 INJECTION, SOLUTION SUBCUTANEOUS
Qty: 2 ML | Refills: 0 | Status: SHIPPED | OUTPATIENT
Start: 2020-11-25 | End: 2021-01-07 | Stop reason: SDUPTHER

## 2020-11-25 NOTE — TELEPHONE ENCOUNTER
Spoke with pt daughter and let her know that I will let Dr. welch know and get back to her on it.          ----- Message from Cherelle Resendiz sent at 11/25/2020  2:26 PM CST -----  Regarding: medication  Sussy is requesting call back in regards to questions about injections that were supposed to be mailed for pt          Pls call back at [phone]

## 2020-12-03 ENCOUNTER — TELEPHONE (OUTPATIENT)
Dept: RHEUMATOLOGY | Facility: CLINIC | Age: 80
End: 2020-12-03

## 2020-12-08 ENCOUNTER — HOSPITAL ENCOUNTER (EMERGENCY)
Facility: HOSPITAL | Age: 80
Discharge: HOME | End: 2020-12-08
Attending: FAMILY MEDICINE
Payer: MEDICARE

## 2020-12-08 VITALS
SYSTOLIC BLOOD PRESSURE: 133 MMHG | DIASTOLIC BLOOD PRESSURE: 62 MMHG | TEMPERATURE: 98 F | OXYGEN SATURATION: 99 % | WEIGHT: 151 LBS | HEART RATE: 74 BPM | HEIGHT: 66 IN | BODY MASS INDEX: 24.27 KG/M2 | RESPIRATION RATE: 16 BRPM

## 2020-12-08 DIAGNOSIS — I63.9 STROKE: ICD-10-CM

## 2020-12-08 DIAGNOSIS — R53.1 WEAKNESS: Primary | ICD-10-CM

## 2020-12-08 LAB
ALBUMIN SERPL BCP-MCNC: 3.8 G/DL (ref 3.5–5.2)
ALP SERPL-CCNC: 87 U/L (ref 55–135)
ALT SERPL W/O P-5'-P-CCNC: 18 U/L (ref 10–44)
ANION GAP SERPL CALC-SCNC: 8 MMOL/L (ref 8–16)
AST SERPL-CCNC: 32 U/L (ref 10–40)
BASOPHILS # BLD AUTO: 0.04 K/UL (ref 0–0.2)
BASOPHILS NFR BLD: 0.5 % (ref 0–1.9)
BILIRUB SERPL-MCNC: 0.6 MG/DL (ref 0.1–1)
BILIRUB UR QL STRIP: NEGATIVE
BUN SERPL-MCNC: 24 MG/DL (ref 8–23)
CALCIUM SERPL-MCNC: 9.1 MG/DL (ref 8.7–10.5)
CHLORIDE SERPL-SCNC: 104 MMOL/L (ref 95–110)
CLARITY UR: CLEAR
CO2 SERPL-SCNC: 28 MMOL/L (ref 23–29)
COLOR UR: YELLOW
CREAT SERPL-MCNC: 1.2 MG/DL (ref 0.5–1.4)
DIFFERENTIAL METHOD: NORMAL
EOSINOPHIL # BLD AUTO: 0.1 K/UL (ref 0–0.5)
EOSINOPHIL NFR BLD: 1.1 % (ref 0–8)
ERYTHROCYTE [DISTWIDTH] IN BLOOD BY AUTOMATED COUNT: 13.3 % (ref 11.5–14.5)
EST. GFR  (AFRICAN AMERICAN): 49 ML/MIN/1.73 M^2
EST. GFR  (NON AFRICAN AMERICAN): 42 ML/MIN/1.73 M^2
GLUCOSE SERPL-MCNC: 200 MG/DL (ref 70–110)
GLUCOSE UR QL STRIP: NEGATIVE
HCT VFR BLD AUTO: 40 % (ref 37–48.5)
HGB BLD-MCNC: 12.8 G/DL (ref 12–16)
HGB UR QL STRIP: NEGATIVE
IMM GRANULOCYTES # BLD AUTO: 0.03 K/UL (ref 0–0.04)
IMM GRANULOCYTES NFR BLD AUTO: 0.4 % (ref 0–0.5)
INR PPP: 1 (ref 0.8–1.2)
KETONES UR QL STRIP: NEGATIVE
LEUKOCYTE ESTERASE UR QL STRIP: NEGATIVE
LYMPHOCYTES # BLD AUTO: 2.7 K/UL (ref 1–4.8)
LYMPHOCYTES NFR BLD: 31.8 % (ref 18–48)
MCH RBC QN AUTO: 30.3 PG (ref 27–31)
MCHC RBC AUTO-ENTMCNC: 32 G/DL (ref 32–36)
MCV RBC AUTO: 95 FL (ref 82–98)
MONOCYTES # BLD AUTO: 0.4 K/UL (ref 0.3–1)
MONOCYTES NFR BLD: 5.2 % (ref 4–15)
NEUTROPHILS # BLD AUTO: 5.2 K/UL (ref 1.8–7.7)
NEUTROPHILS NFR BLD: 61 % (ref 38–73)
NITRITE UR QL STRIP: NEGATIVE
NRBC BLD-RTO: 0 /100 WBC
PH UR STRIP: 7 [PH] (ref 5–8)
PLATELET # BLD AUTO: 152 K/UL (ref 150–350)
PMV BLD AUTO: 11 FL (ref 9.2–12.9)
POCT GLUCOSE: 90 MG/DL (ref 70–110)
POTASSIUM SERPL-SCNC: 4.7 MMOL/L (ref 3.5–5.1)
PROT SERPL-MCNC: 7.1 G/DL (ref 6–8.4)
PROT UR QL STRIP: NEGATIVE
PROTHROMBIN TIME: 10.8 SEC (ref 9–12.5)
RBC # BLD AUTO: 4.23 M/UL (ref 4–5.4)
SODIUM SERPL-SCNC: 140 MMOL/L (ref 136–145)
SP GR UR STRIP: 1.02 (ref 1–1.03)
TSH SERPL DL<=0.005 MIU/L-ACNC: 0.77 UIU/ML (ref 0.4–4)
URN SPEC COLLECT METH UR: NORMAL
UROBILINOGEN UR STRIP-ACNC: NEGATIVE EU/DL
WBC # BLD AUTO: 8.53 K/UL (ref 3.9–12.7)

## 2020-12-08 PROCEDURE — 81003 URINALYSIS AUTO W/O SCOPE: CPT | Mod: HCNC

## 2020-12-08 PROCEDURE — 85610 PROTHROMBIN TIME: CPT | Mod: HCNC

## 2020-12-08 PROCEDURE — 84443 ASSAY THYROID STIM HORMONE: CPT | Mod: HCNC

## 2020-12-08 PROCEDURE — 85025 COMPLETE CBC W/AUTO DIFF WBC: CPT | Mod: HCNC

## 2020-12-08 PROCEDURE — 93010 ELECTROCARDIOGRAM REPORT: CPT | Mod: HCNC,,, | Performed by: INTERNAL MEDICINE

## 2020-12-08 PROCEDURE — 93010 EKG 12-LEAD: ICD-10-PCS | Mod: HCNC,,, | Performed by: INTERNAL MEDICINE

## 2020-12-08 PROCEDURE — 80061 LIPID PANEL: CPT | Mod: HCNC

## 2020-12-08 PROCEDURE — 99285 EMERGENCY DEPT VISIT HI MDM: CPT | Mod: 25,HCNC

## 2020-12-08 PROCEDURE — 93005 ELECTROCARDIOGRAM TRACING: CPT | Mod: HCNC

## 2020-12-08 PROCEDURE — 80053 COMPREHEN METABOLIC PANEL: CPT | Mod: HCNC

## 2020-12-08 NOTE — ED NOTES
Unable to place pt on cardiac monitor at any point prior until now as patient just placed into ED bed.

## 2020-12-08 NOTE — ED PROVIDER NOTES
1:27 PM: Leaning to side while walking since yesterday.      Pt was placed back in lobby. I explained to pt that due to lack of available rooms pt was seen by myself to begin the workup. Pt understands they will be seen and dispositioned by the next available physician. Pt voices understanding and agrees with plan. Pt also understands the longer than normal wait time. I am removing myself from the care of pt and pt will now be assigned to the next available physician.     Palmira Menezes PA-C  1:27 PM      SCRIBE #1 NOTE: I, Lucho Mcnulty, am scribing for, and in the presence of, Nelsy Sarabia MD. I have scribed the entire note.       History     Chief Complaint   Patient presents with    Weakness     no drift in pts upper extermities, pts unable to walk steady and needs assistance. reports sx started yesterday.      Review of patient's allergies indicates:  No Known Allergies      History of Present Illness     HPI    12/8/2020, 5:28 PM  History obtained from the patient      History of Present Illness: Shannon Solano is a 80 y.o. female patient with a PMHx of HTN, HLD, stroke, anemia, who presents to the Emergency Department for evaluation weakness. The daughter brought pt here because pt has been leaning to one side while walking since yesterday; concerned of possible stroke. The daughter is not here now, so unsure which side. Symptoms are constant and moderate in severity. No mitigating or exacerbating factors reported. No associated sxs reported. Patient denies any and all sxs at this time. No prior Tx reported. No further complaints or concerns at this time.       Arrival mode: Personal vehicle    PCP: Leslie Poe MD        Past Medical History:  Past Medical History:   Diagnosis Date    Anemia     Arthritis     Cataract     Disorder of kidney and ureter     Hyperlipidemia     Hypertension     Insomnia 8/28/2019    Osteoarthritis     Polyneuropathy     Stroke        Past Surgical  History:  Past Surgical History:   Procedure Laterality Date    APPENDECTOMY      CATARACT EXTRACTION Bilateral 2014    EYE SURGERY Bilateral     HYSTERECTOMY      VARICOSE VEIN SURGERY           Family History:  Family History   Problem Relation Age of Onset    Breast cancer Sister     Heart disease Sister     Colon cancer Sister         age 70    Cancer Sister         breast    Obesity Mother     Stroke Mother     Obesity Brother     Alzheimer's disease Brother     Cancer Brother     No Known Problems Sister     Hypertension Daughter     Melanoma Neg Hx        Social History:  Social History     Tobacco Use    Smoking status: Never Smoker    Smokeless tobacco: Never Used   Substance and Sexual Activity    Alcohol use: No    Drug use: No    Sexual activity: Not Currently        Review of Systems     Review of Systems   Constitutional: Negative for activity change, appetite change, chills, diaphoresis and fever.   HENT: Negative for congestion, drooling, ear pain, mouth sores, rhinorrhea, sinus pain, sore throat and trouble swallowing.    Eyes: Negative for pain and discharge.   Respiratory: Negative for cough, chest tightness, shortness of breath, wheezing and stridor.    Cardiovascular: Negative for chest pain, palpitations and leg swelling.   Gastrointestinal: Negative for abdominal distention, abdominal pain, blood in stool, constipation, diarrhea, nausea and vomiting.   Genitourinary: Negative for difficulty urinating, dysuria, flank pain, frequency, hematuria and urgency.   Musculoskeletal: Negative for arthralgias, back pain and myalgias.   Skin: Negative for pallor, rash and wound.   Neurological: Positive for weakness. Negative for dizziness, syncope, light-headedness and numbness.   All other systems reviewed and are negative.       Physical Exam     Initial Vitals [12/08/20 1321]   BP Pulse Resp Temp SpO2   133/66 68 18 98 °F (36.7 °C) 95 %      MAP       --          Physical  "Exam  Nursing Notes and Vital Signs Reviewed.  Constitutional: Patient is in no acute distress.  Head: Atraumatic. Normocephalic.  Eyes:  EOM intact. Conjunctivae are not pale. No scleral icterus.  ENT: Mucous membranes are moist. Oropharynx is clear and symmetric.    Neck: Supple. Full ROM.  Cardiovascular: Regular rate. Regular rhythm. No murmurs, rubs, or gallops. Distal pulses are 2+ and symmetric.  Pulmonary/Chest: No respiratory distress. Clear to auscultation bilaterally. No wheezing or rales.  Abdominal: Soft and non-distended.  There is no tenderness.  No rebound, guarding, or rigidity. Good bowel sounds.  Genitourinary: No CVA tenderness  Musculoskeletal: Moves all extremities. No obvious deformities. No edema. No calf tenderness.  Skin: Warm and dry.  Neurological:  Alert, awake, and appropriate.  Normal speech.  No acute focal neurological deficits are appreciated.  Psychiatric: Normal affect. Good eye contact. Appropriate in content.     ED Course   Procedures  ED Vital Signs:  Vitals:    12/08/20 1321 12/08/20 1615 12/08/20 1754 12/08/20 1919   BP: 133/66  (!) 142/84 133/62   Pulse: 68 74 70 74   Resp: 18  20 16   Temp: 98 °F (36.7 °C)      TempSrc: Oral      SpO2: 95%  100% 99%   Weight: 68.5 kg (151 lb 0.2 oz)      Height: 5' 6" (1.676 m)          Abnormal Lab Results:  Labs Reviewed   COMPREHENSIVE METABOLIC PANEL - Abnormal; Notable for the following components:       Result Value    Glucose 200 (*)     BUN 24 (*)     eGFR if  49 (*)     eGFR if non  42 (*)     All other components within normal limits   LIPID PANEL - Abnormal; Notable for the following components:    Triglycerides 166 (*)     All other components within normal limits   CBC W/ AUTO DIFFERENTIAL   PROTIME-INR   TSH   URINALYSIS, REFLEX TO URINE CULTURE    Narrative:     Specimen Source->Urine   POCT GLUCOSE        All Lab Results:  Results for orders placed or performed during the hospital encounter " of 12/08/20   CBC W/ AUTO DIFFERENTIAL   Result Value Ref Range    WBC 8.53 3.90 - 12.70 K/uL    RBC 4.23 4.00 - 5.40 M/uL    Hemoglobin 12.8 12.0 - 16.0 g/dL    Hematocrit 40.0 37.0 - 48.5 %    MCV 95 82 - 98 fL    MCH 30.3 27.0 - 31.0 pg    MCHC 32.0 32.0 - 36.0 g/dL    RDW 13.3 11.5 - 14.5 %    Platelets 152 150 - 350 K/uL    MPV 11.0 9.2 - 12.9 fL    Immature Granulocytes 0.4 0.0 - 0.5 %    Gran # (ANC) 5.2 1.8 - 7.7 K/uL    Immature Grans (Abs) 0.03 0.00 - 0.04 K/uL    Lymph # 2.7 1.0 - 4.8 K/uL    Mono # 0.4 0.3 - 1.0 K/uL    Eos # 0.1 0.0 - 0.5 K/uL    Baso # 0.04 0.00 - 0.20 K/uL    nRBC 0 0 /100 WBC    Gran % 61.0 38.0 - 73.0 %    Lymph % 31.8 18.0 - 48.0 %    Mono % 5.2 4.0 - 15.0 %    Eosinophil % 1.1 0.0 - 8.0 %    Basophil % 0.5 0.0 - 1.9 %    Differential Method Automated    Comprehensive metabolic panel   Result Value Ref Range    Sodium 140 136 - 145 mmol/L    Potassium 4.7 3.5 - 5.1 mmol/L    Chloride 104 95 - 110 mmol/L    CO2 28 23 - 29 mmol/L    Glucose 200 (H) 70 - 110 mg/dL    BUN 24 (H) 8 - 23 mg/dL    Creatinine 1.2 0.5 - 1.4 mg/dL    Calcium 9.1 8.7 - 10.5 mg/dL    Total Protein 7.1 6.0 - 8.4 g/dL    Albumin 3.8 3.5 - 5.2 g/dL    Total Bilirubin 0.6 0.1 - 1.0 mg/dL    Alkaline Phosphatase 87 55 - 135 U/L    AST 32 10 - 40 U/L    ALT 18 10 - 44 U/L    Anion Gap 8 8 - 16 mmol/L    eGFR if African American 49 (A) >60 mL/min/1.73 m^2    eGFR if non African American 42 (A) >60 mL/min/1.73 m^2   Protime-INR   Result Value Ref Range    Prothrombin Time 10.8 9.0 - 12.5 sec    INR 1.0 0.8 - 1.2   TSH   Result Value Ref Range    TSH 0.769 0.400 - 4.000 uIU/mL   LDL - Lipid Panel   Result Value Ref Range    Cholesterol 148 120 - 199 mg/dL    Triglycerides 166 (H) 30 - 150 mg/dL    HDL 51 40 - 75 mg/dL    LDL Cholesterol 63.8 63.0 - 159.0 mg/dL    HDL/Cholesterol Ratio 34.5 20.0 - 50.0 %    Total Cholesterol/HDL Ratio 2.9 2.0 - 5.0    Non-HDL Cholesterol 97 mg/dL   Urinalysis, Reflex to Urine Culture  Urine, Clean Catch    Specimen: Urine   Result Value Ref Range    Specimen UA Urine, Clean Catch     Color, UA Yellow Yellow, Straw, Madai    Appearance, UA Clear Clear    pH, UA 7.0 5.0 - 8.0    Specific Gravity, UA 1.020 1.005 - 1.030    Protein, UA Negative Negative    Glucose, UA Negative Negative    Ketones, UA Negative Negative    Bilirubin (UA) Negative Negative    Occult Blood UA Negative Negative    Nitrite, UA Negative Negative    Urobilinogen, UA Negative <2.0 EU/dL    Leukocytes, UA Negative Negative   POCT glucose   Result Value Ref Range    POCT Glucose 90 70 - 110 mg/dL         Imaging Results:  Imaging Results          CT Head Without Contrast (Final result)  Result time 12/08/20 14:34:44    Final result by Fredo Solomon III, MD (12/08/20 14:34:44)                 Impression:      1.  Atrophy.  Bilateral white matter changes.  Scattered lacuna which have progressed since the prior study.  These are all most likely related to microvascular disease.  No bleed or other gross acute intracranial event suggested.    All CT scans at this facility are performed  using dose modulation techniques as appropriate to performed exam including the following:  automated exposure control; adjustment of mA and/or kV according to the patients size (this includes techniques or standardized protocols for targeted exams where dose is matched to indication/reason for exam: i.e. extremities or head);  iterative reconstruction technique.      Electronically signed by: Fredo Solomon MD  Date:    12/08/2020  Time:    14:34             Narrative:    EXAMINATION:  CT HEAD WITHOUT CONTRAST    CLINICAL HISTORY:  Neuro deficit, acute, stroke suspected;    TECHNIQUE:  Standard non contrast CT scan of the brain.    COMPARISON:  April    FINDINGS:  There are again noted generalized changes of atrophy, both supra and infratentorial.  Minimal white matter changes are again noted.  There scattered lacuna bilaterally.  Interval  progression of these changes in the left periventricular white matter.  There is no hemorrhage, mass lesion, hydrocephalus, or midline shift.  No acute/depressed skull fracture.                                 The EKG was ordered, reviewed, and independently interpreted by the ED provider.  Interpretation time: 13:21  Rate: 68 BPM  Rhythm: normal sinus rhythm  Interpretation: Nonspecific T wave abnormality. No STEMI.  When compared to EKG performed 28-APR-2020, PVC's are no longer present. T wave inversion now evident in inferior leads.           The Emergency Provider reviewed the vital signs and test results, which are outlined above.     ED Discussion       7:00 PM: Reassessed pt at this time.  Pt states her condition has improved at this time. Discussed with pt all pertinent ED information and results. Discussed pt dx and plan of tx. Gave pt all f/u and return to the ED instructions. All questions and concerns were addressed at this time. Pt expresses understanding of information and instructions, and is comfortable with plan to discharge. Pt is stable for discharge.    I discussed with patient and/or family/caretaker that evaluation in the ED does not suggest any emergent or life threatening medical conditions requiring immediate intervention beyond what was provided in the ED, and I believe patient is safe for discharge.  Regardless, an unremarkable evaluation in the ED does not preclude the development or presence of a serious of life threatening condition. As such, patient was instructed to return immediately for any worsening or change in current symptoms.         Medical Decision Making:   Clinical Tests:   Lab Tests: Ordered and Reviewed  Radiological Study: Ordered and Reviewed  Medical Tests: Ordered and Reviewed           ED Medication(s):  Medications - No data to display    Discharge Medication List as of 12/8/2020  7:00 PM          Follow-up Information     Leslie Poe MD.     Specialty: Family Medicine  Contact information:  [address] [facility] [address]  [phone]                       Scribe Attestation:   Scribe #1: I performed the above scribed service and the documentation accurately describes the services I performed. I attest to the accuracy of the note.     Attending:   Physician Attestation Statement for Scribe #1: I, Nelsy Sarabia MD, personally performed the services described in this documentation, as scribed by Lucho Mcnulty, in my presence, and it is both accurate and complete.           Clinical Impression       ICD-10-CM ICD-9-CM   1. Weakness  R53.1 780.79   2. Stroke  I63.9 434.91       Disposition:   Disposition: Discharged  Condition: Stable             Nelsy Sarabia MD  12/09/20 3524

## 2020-12-09 LAB
CHOLEST SERPL-MCNC: 148 MG/DL (ref 120–199)
CHOLEST/HDLC SERPL: 2.9 {RATIO} (ref 2–5)
HDLC SERPL-MCNC: 51 MG/DL (ref 40–75)
HDLC SERPL: 34.5 % (ref 20–50)
LDLC SERPL CALC-MCNC: 63.8 MG/DL (ref 63–159)
NONHDLC SERPL-MCNC: 97 MG/DL
TRIGL SERPL-MCNC: 166 MG/DL (ref 30–150)

## 2020-12-11 ENCOUNTER — PATIENT MESSAGE (OUTPATIENT)
Dept: OTHER | Facility: OTHER | Age: 80
End: 2020-12-11

## 2020-12-13 ENCOUNTER — SPECIALTY PHARMACY (OUTPATIENT)
Dept: PHARMACY | Facility: CLINIC | Age: 80
End: 2020-12-13

## 2020-12-14 NOTE — TELEPHONE ENCOUNTER
Dottie CHAVES approved #[id number]. Status: Approved, Coverage Starts on: 12/14/2020 12:00:00 AM, Coverage Ends on: 6/12/2021. Patient forwarded to benefit investigation team.

## 2020-12-17 ENCOUNTER — OFFICE VISIT (OUTPATIENT)
Dept: PODIATRY | Facility: CLINIC | Age: 80
End: 2020-12-17
Payer: MEDICARE

## 2020-12-17 VITALS
WEIGHT: 148.06 LBS | HEART RATE: 66 BPM | SYSTOLIC BLOOD PRESSURE: 133 MMHG | DIASTOLIC BLOOD PRESSURE: 73 MMHG | BODY MASS INDEX: 23.8 KG/M2 | HEIGHT: 66 IN

## 2020-12-17 DIAGNOSIS — L97.322 ULCER OF LEFT ANKLE, WITH FAT LAYER EXPOSED: Primary | ICD-10-CM

## 2020-12-17 DIAGNOSIS — I87.2 VENOUS INSUFFICIENCY OF BOTH LOWER EXTREMITIES: ICD-10-CM

## 2020-12-17 PROCEDURE — 99499 UNLISTED E&M SERVICE: CPT | Mod: HCNC,S$GLB,, | Performed by: PODIATRIST

## 2020-12-17 PROCEDURE — 1126F AMNT PAIN NOTED NONE PRSNT: CPT | Mod: HCNC,S$GLB,, | Performed by: PODIATRIST

## 2020-12-17 PROCEDURE — 1101F PT FALLS ASSESS-DOCD LE1/YR: CPT | Mod: HCNC,CPTII,S$GLB, | Performed by: PODIATRIST

## 2020-12-17 PROCEDURE — 97597 PR DEBRIDEMENT OPEN WOUND 20 SQ CM<: ICD-10-PCS | Mod: HCNC,S$GLB,, | Performed by: PODIATRIST

## 2020-12-17 PROCEDURE — 3288F PR FALLS RISK ASSESSMENT DOCUMENTED: ICD-10-PCS | Mod: HCNC,CPTII,S$GLB, | Performed by: PODIATRIST

## 2020-12-17 PROCEDURE — 99999 PR PBB SHADOW E&M-EST. PATIENT-LVL IV: ICD-10-PCS | Mod: PBBFAC,HCNC,, | Performed by: PODIATRIST

## 2020-12-17 PROCEDURE — 99499 NO LOS: ICD-10-PCS | Mod: HCNC,S$GLB,, | Performed by: PODIATRIST

## 2020-12-17 PROCEDURE — 1126F PR PAIN SEVERITY QUANTIFIED, NO PAIN PRESENT: ICD-10-PCS | Mod: HCNC,S$GLB,, | Performed by: PODIATRIST

## 2020-12-17 PROCEDURE — 1101F PR PT FALLS ASSESS DOC 0-1 FALLS W/OUT INJ PAST YR: ICD-10-PCS | Mod: HCNC,CPTII,S$GLB, | Performed by: PODIATRIST

## 2020-12-17 PROCEDURE — 3288F FALL RISK ASSESSMENT DOCD: CPT | Mod: HCNC,CPTII,S$GLB, | Performed by: PODIATRIST

## 2020-12-17 PROCEDURE — 99999 PR PBB SHADOW E&M-EST. PATIENT-LVL IV: CPT | Mod: PBBFAC,HCNC,, | Performed by: PODIATRIST

## 2020-12-17 PROCEDURE — 97597 DBRDMT OPN WND 1ST 20 CM/<: CPT | Mod: HCNC,S$GLB,, | Performed by: PODIATRIST

## 2020-12-17 NOTE — PROGRESS NOTES
Subjective:       Patient ID: Shannon Solano is a 80 y.o. female.    Chief Complaint: Wound Check (L. foot, 0/10, casual shoes, non diabetic, pcp Dr. Esparza.)      HPI: Shannon Solano presents to the clinic today, for evaluation and treatment concerning an ulceration/wound/bulla(e) to the anterior aspect left lower leg. Patient's Primary Care Provider is Leslie Poe MD. The PMHx. does include CKD and venous insufficiency. The wound(s) have/has been present for several months. Most recent local wound care w/ are collagen dressings. Patient does have home health services. Home Health Agency: [facility].  Patient presents this morning with her daughter.    Hemoglobin A1C   Date Value Ref Range Status   02/14/2020 5.7 (H) 4.0 - 5.6 % Final     Comment:     ADA Screening Guidelines:  5.7-6.4%  Consistent with prediabetes  >or=6.5%  Consistent with diabetes  High levels of fetal hemoglobin interfere with the HbA1C  assay. Heterozygous hemoglobin variants (HbS, HgC, etc)do  not significantly interfere with this assay.   However, presence of multiple variants may affect accuracy.     09/12/2019 5.6 4.0 - 5.6 % Final     Comment:     ADA Screening Guidelines:  5.7-6.4%  Consistent with prediabetes  >or=6.5%  Consistent with diabetes  High levels of fetal hemoglobin interfere with the HbA1C  assay. Heterozygous hemoglobin variants (HbS, HgC, etc)do  not significantly interfere with this assay.   However, presence of multiple variants may affect accuracy.     07/06/2017 5.6 4.0 - 5.6 % Final     Comment:     According to ADA guidelines, hemoglobin A1c <7.0% represents  optimal control in non-pregnant diabetic patients. Different  metrics may apply to specific patient populations.   Standards of Medical Care in Diabetes-2016.  For the purpose of screening for the presence of diabetes:  <5.7%     Consistent with the absence of diabetes  5.7-6.4%  Consistent with increasing risk for diabetes    (prediabetes)  >or=6.5%  Consistent with diabetes  Currently, no consensus exists for use of hemoglobin A1c  for diagnosis of diabetes for children.  This Hemoglobin A1c assay has significant interference with fetal   hemoglobin   (HbF). The results are invalid for patients with abnormal amounts of   HbF,   including those with known Hereditary Persistence   of Fetal Hemoglobin. Heterozygous hemoglobin variants (HbAS, HbAC,   HbAD, HbAE, HbA2) do not significantly interfere with this assay;   however, presence of multiple variants in a sample may impact the %   interference.         Review of patient's allergies indicates:  No Known Allergies    Past Medical History:   Diagnosis Date    Anemia     Arthritis     Cataract     Disorder of kidney and ureter     Hyperlipidemia     Hypertension     Insomnia 8/28/2019    Osteoarthritis     Polyneuropathy     Stroke        Family History   Problem Relation Age of Onset    Breast cancer Sister     Heart disease Sister     Colon cancer Sister         age 70    Cancer Sister         breast    Obesity Mother     Stroke Mother     Obesity Brother     Alzheimer's disease Brother     Cancer Brother     No Known Problems Sister     Hypertension Daughter     Melanoma Neg Hx        Social History     Socioeconomic History    Marital status:      Spouse name: Not on file    Number of children: 2    Years of education: Not on file    Highest education level: Not on file   Occupational History    Not on file   Social Needs    Financial resource strain: Not on file    Food insecurity     Worry: Not on file     Inability: Not on file    Transportation needs     Medical: Not on file     Non-medical: Not on file   Tobacco Use    Smoking status: Never Smoker    Smokeless tobacco: Never Used   Substance and Sexual Activity    Alcohol use: No    Drug use: No    Sexual activity: Not Currently   Lifestyle    Physical activity     Days per week: Not on  "file     Minutes per session: Not on file    Stress: Not on file   Relationships    Social connections     Talks on phone: Not on file     Gets together: Not on file     Attends [employer] service: Not on file     Active member of club or organization: Not on file     Attends meetings of clubs or organizations: Not on file     Relationship status: Not on file   Other Topics Concern    Are you pregnant or think you may be? No    Breast-feeding No   Social History Narrative    Not on file       Past Surgical History:   Procedure Laterality Date    APPENDECTOMY      CATARACT EXTRACTION Bilateral 2014    EYE SURGERY Bilateral     HYSTERECTOMY      VARICOSE VEIN SURGERY         Review of Systems   Constitutional: Negative for chills, fatigue and fever.   HENT: Negative for hearing loss.    Eyes: Negative for photophobia and visual disturbance.   Respiratory: Negative for cough, chest tightness, shortness of breath and wheezing.    Cardiovascular: Positive for leg swelling. Negative for chest pain and palpitations.   Gastrointestinal: Negative for constipation, diarrhea, nausea and vomiting.   Endocrine: Negative for cold intolerance and heat intolerance.   Genitourinary: Negative for flank pain.   Musculoskeletal: Positive for arthralgias, gait problem, joint swelling and myalgias. Negative for neck pain and neck stiffness.   Skin: Negative for color change and wound.   Neurological: Negative for light-headedness and headaches.   Psychiatric/Behavioral: Negative for sleep disturbance.          Objective:   /73   Pulse 66   Ht 5' 6" (1.676 m)   Wt 67.1 kg (148 lb 0.6 oz)   BMI 23.89 kg/m²                   LOWER EXTREMITY PHYSICAL EXAMINATION  VASCULAR: On the left and right foot, the dorsalis pedis pulse is 2/4 and the posterior tibial pulse is 1/4. Capillary refill time is less than 3 seconds. Hair growth is present on the dorsum of the foot and at the digits. Proximal to distal temperature is warm to " warm.  Edema is noted to the bilateral lower extremity. Varicosities and telangiectasia are noted.    DERMATOLOGY: The ulceration at the anterior aspect of the left lower leg is healed and resolved at present. No local signs of infection at present.     Assessment:     1. Ulcer of left ankle, with fat layer exposed    2. Venous insufficiency of both lower extremities        Plan:     Ulcer of left ankle, with fat layer exposed    Venous insufficiency of both lower extremities      Thorough discussion is had with the patient today, concerning the diagnosis, its etiology, and the treatment algorithm at present.    The wound was surgically debrided after adequate prep with alcohol and/or betadine paint. Selective wound debridement was performed using sharp #10/#15 blade/rounded scalpel and tissue nipper, with removal of all non-viable skin and soft tissues; necrotic skin/tissue formation. Post debridement measurements are as above. Hemostasis was achieved. No viable tissues were debrided. Patient tolerated procedure well and without complications. Local woundcare with topical ABx. ointment and bandage applied thereafter.     D/C home care.           Future Appointments   Date Time Provider Department Center   1/13/2021  9:00 AM Acacia Boland PA-C [facility]   5/10/2021 10:00 AM Roberto Montes MD [facility]  [facility]

## 2020-12-21 ENCOUNTER — TELEPHONE (OUTPATIENT)
Dept: CARDIOLOGY | Facility: CLINIC | Age: 80
End: 2020-12-21

## 2020-12-21 NOTE — TELEPHONE ENCOUNTER
Spoke with Sussy - informed that I spoke with [facility] - the PA was approved 12/14 and waiting on insurance now - [facility] escalated call and pt should here from them tomorrow = told pt to call us back if she does not hear from them by tomorrow  Pt verbalized understanding

## 2020-12-21 NOTE — TELEPHONE ENCOUNTER
----- Message from Gertrude Rivas sent at 12/21/2020 12:04 PM CST -----  Contact: Sussy/Daughter  Sussy is calling regards to the status of a medication (a shot to reduce cholesterol) that was supposed to be sent to her house but hasn't gotten it yet. May you please give her a call back at [phone].    Thanks  KT

## 2020-12-28 RX ORDER — LANOLIN ALCOHOL/MO/W.PET/CERES
CREAM (GRAM) TOPICAL
Qty: 90 TABLET | Refills: 1 | Status: SHIPPED | OUTPATIENT
Start: 2020-12-28 | End: 2021-06-08

## 2020-12-29 ENCOUNTER — SPECIALTY PHARMACY (OUTPATIENT)
Dept: PHARMACY | Facility: CLINIC | Age: 80
End: 2020-12-29

## 2020-12-29 ENCOUNTER — TELEPHONE (OUTPATIENT)
Dept: CARDIOLOGY | Facility: CLINIC | Age: 80
End: 2020-12-29

## 2020-12-29 NOTE — TELEPHONE ENCOUNTER
Spoke with pt's daughter, she states that pt has dementia and is feeling depressed. No suicidal thoughts but wanted the doctor to call her in something for the depression. Informed her that she will have to have an appointment with Dr. Esparza to discuss this. She states that is not going to happen. Informed her again that she will not be able to prescribe a medication without pt being seen. She said thank you and hung up.

## 2020-12-29 NOTE — TELEPHONE ENCOUNTER
Pt states that the injections will be delivered tomorrow    ----- Message from Naa Gray sent at 12/29/2020 10:06 AM CST -----  Daughter of pt is calling regarding pt has not received the injection. Would like to check the status of injection. Please call back at [phone]

## 2020-12-29 NOTE — TELEPHONE ENCOUNTER
Specialty Pharmacy - Initial Clinical Assessment    Specialty Medication Orders Linked to Encounter      Most Recent Value   Medication #1  evolocumab (REPATHA SURECLICK) 140 mg/mL PnIj (Order#[id number], Rx#[id number])        Subjective    Shannon Solano is a 80 y.o. female, who is followed by the specialty pharmacy service for management and education.    Recent Encounters     Date Type Provider Description    12/29/2020 Specialty Pharmacy Juan Luis Gordon, PharmD Initial Clinical Assessment    12/13/2020 Specialty Pharmacy Isis Lau, Pia Referral Authorization        Clinical call attempts since last clinical assessment   No call attempts found.     Today she received education before her first fill with [facility].    Current Outpatient Medications   Medication Sig    amLODIPine (NORVASC) 2.5 MG tablet Take 1 tablet (2.5 mg total) by mouth once daily.    aspirin (ECOTRIN) 81 MG EC tablet Take 81 mg by mouth once daily.    atorvastatin (LIPITOR) 40 MG tablet Take 1 tablet (40 mg total) by mouth once daily.    clindamycin (CLEOCIN) 300 MG capsule Take 1 capsule (300 mg total) by mouth 3 (three) times daily.    donepeziL (ARICEPT) 10 MG tablet TAKE 1 TABLET BY MOUTH EVERY DAY IN THE EVENING    evolocumab (REPATHA SURECLICK) 140 mg/mL PnIj Inject 1 mL (140 mg total) into the skin every 14 (fourteen) days.    fludrocortisone (FLORINEF) 0.1 mg Tab Take 1 tablet (100 mcg total) by mouth once daily.    folic acid-vit B6-vit B12 2.5-25-2 mg (FOLBIC OR EQUIV) 2.5-25-2 mg Tab Take 1 tablet by mouth once daily.    gabapentin (NEURONTIN) 400 MG capsule Take 1 capsule (400 mg total) by mouth every evening.    irbesartan (AVAPRO) 300 MG tablet Take 1 tablet (300 mg total) by mouth once daily.    memantine (NAMENDA) 10 MG Tab TAKE 1 TABLET BY MOUTH TWICE A DAY    multivitamin (ONE DAILY MULTIVITAMIN) per tablet Take 1 tablet by mouth once daily.    thiamine 100 MG tablet TAKE 1 TABLET BY MOUTH  EVERY DAY    traZODone (DESYREL) 50 MG tablet TAKE 1 TABLET (50 MG TOTAL) BY MOUTH EVERY EVENING.   Last reviewed on 12/17/2020  9:51 AM by Melchor Spann DPM    Review of patient's allergies indicates:  No Known AllergiesLast reviewed on  12/17/2020 9:51 AM by Melchor Spann    Drug Interactions    Drug interactions evaluated: yes  Clinically relevant drug interactions identified: no  Provided the patient with educational material regarding drug interactions: not applicable           Assessment Questions - Documented Responses      Most Recent Value   Assessment   Medication Reconciliation completed for patient  Yes   During the past 4 weeks, has patient missed any activities due to condition or medication?  No   During the past 4 weeks, did patient have any of the following urgent care visits?  None   Goals of Therapy Status  Discussed (new start)   Welcome packet contents reviewed and discussed with patient?  Yes   Assesment completed?  Yes   Plan  Therapy being initiated   Do you need to open a clinical intervention (i-vent)?  No   Do you want to schedule first shipment?  Yes        Refill Questions - Documented Responses      Most Recent Value   Relationship to patient of person spoken to?  Child   HIPAA/medical authority confirmed?  Yes   Can the patient store medication/sharps container properly (at the correct temperature, away from children/pets, etc.)?  Yes   Can the patient call emergency services (911) in the event of an emergency?  Yes   Does the patient have any concerns or questions about taking or administering this medication as prescribed?  No   How many doses does the patient have on hand?  0   How many days does the patient report on hand quantity will last?  1   During the past 4 weeks, has patient missed any activities due to condition or medication?  No   During the past 4 weeks, did patient have any of the following urgent care visits?  None   How will the patient receive the medication?   "Mail   When does the patient need to receive the medication?  12/30/20   Shipping Address  Home   Address in [facility] confirmed and updated if neccessary?  Yes   Expected Copay ($)  0   Is the patient able to afford the medication copay?  Yes   Payment Method  zero copay   Days supply of Refill  28   Refill activity completed?  Yes   Refill activity plan  Refill scheduled   Shipment/Pickup Date:  12/29/20          Objective    She has a past medical history of Anemia, Arthritis, Cataract, Disorder of kidney and ureter, Hyperlipidemia, Hypertension, Insomnia (8/28/2019), Osteoarthritis, Polyneuropathy, and Stroke.    Tried/failed medications: Statins    BP Readings from Last 4 Encounters:   12/17/20 133/73   12/08/20 133/62   11/17/20 (!) 161/72   11/16/20 (!) 142/76     Ht Readings from Last 4 Encounters:   12/17/20 5' 6" (1.676 m)   12/08/20 5' 6" (1.676 m)   11/17/20 5' 6" (1.676 m)   11/16/20 5' 6" (1.676 m)     Wt Readings from Last 4 Encounters:   12/17/20 67.1 kg (148 lb 0.6 oz)   12/08/20 68.5 kg (151 lb 0.2 oz)   11/17/20 68.6 kg (151 lb 3.8 oz)   11/16/20 68.2 kg (150 lb 5.7 oz)       The goals of prescribed drug therapy management include:  · Supporting patient to meet the prescriber's medical treatment objectives  · Improving or maintaining quality of life  · Maintaining optimal therapy adherence  · Minimizing and managing side effects      Goals of Therapy Status: Discussed (new start)    Assessment/Plan  Patient plans to start therapy on 12/30/20      Indication, dosage, appropriateness, effectiveness, safety and convenience of her specialty medication(s) were reviewed today.     Patient Counseling    Counseled the patient on the following: doses and administration discussed, safe handling, storage, and disposal discussed, possible adverse effects and management discussed, possible drug and prescription drug interactions discussed, possible drug and OTC drug and food interactions discussed, lab " monitoring and follow-up discussed, therapeutic rationale discussed, cost of medications and cost implications discussed, adherence and missed doses discussed, pharmacy contact information discussed       Counseled patient's daughter on administration directions:  - Inject 140 mg into the skin every 14 days.   - Take out of the refrigerator 30-60 minutes prior to injection.  - Wash hands before and after injection.  - Monthly RX will come with gauze, bandaids, and alcohol swabs.  - Patient may inject in either the tops of the thighs, abdomen (at least 2 inches away from belly button), or the outer part of upper arm. Patient was instructed to rotate injections sites.  - Patient is to wipe down the injection site with the alcohol pad, wait to dry. Gently squeeze the area of the cleaned skin and hold it firmly. Place the pen flat against the raised area of skin that is being squeezed, then push down on the button and release, in 10-15 seconds you will hear a click and the window will go from from clear to yellow, indicating injection is complete.  - Patient will use sharps container to dispose of used medication device. Once full, per [place] law, patient may lock the sharps container and place it in the trash. Patient can then contact the pharmacy and [facility] will replace the sharps at no additional charge.    Patient was counseled on possible side effects:  - Injection site reaction: redness, soreness, itching, bruising, which should resolve within 3-5 days.  - Flu-like symptoms    DDI: Medication list reviewed. No DDIs. Patient MUST contact myself or provider prior to starting any new OTC, herbal, or prescription drugs to avoid potential DDIs.    Allergies: NKDA    Storage: Advised to keep refrigerated for stability until expiration on the box, but room temperature is ok if used within 30 days. Pt advised to keep in the fridge in an area that will not freeze or get too warm.    Diet and exercise recommendations reviewed -  heart healthy diet low in fats/fried foods and at least 30 minutes of exercise 5 days a week.    Patient understands to report any medication changes to [facility] and provider. All questions answered and addressed to patients satisfaction.[facility] to contact patient in 3 weeks for refills.     Tasks added this encounter   1/19/2021 - Refill Call (Auto Added)   Tasks due within next 3 months   No tasks due.     Hugh GuzmanD  [facility]  [address] [place] [id number]  Phone: [phone]  Fax: [phone]

## 2020-12-29 NOTE — TELEPHONE ENCOUNTER
----- Message from Pariskat  sent at 12/29/2020 12:41 PM CST -----  Type:  Needs Medical Advice    Who Called: Pt daughter   Symptoms (please be specific):    How long has patient had these symptoms:    Pharmacy name and phone #:    [id number] - [place], [place] - [address]  [address] [facility] [address]  Phone: [phone] Fax: [phone]    Would the patient rather a call back or a response via [facility]? Call   Best Call Back Number: [phone] (home)   Additional Information:   Pt is requesting a call back, wants something called in for pt for depression

## 2020-12-30 NOTE — TELEPHONE ENCOUNTER
Incoming call from patient's daughter to review administration directions. Walked her through administration step by step - she attempted to administer as we walked through the steps. Patient's daughter stated that she believed she followed the steps correctly but that the window did not turn yellow. She re-attempted injection but had the same result. Advised patient that it has happened that some pens are manufactured incorrectly. She re-attempted a third time when the second pen with the same result - no medication administered.     Provided patient's daughter with  phone number and advised her to reach out to explain what happened with pens and request replacement. She voiced understanding. [facility] will follow up with patient to ensure replacements received.

## 2021-01-05 ENCOUNTER — TELEPHONE (OUTPATIENT)
Dept: CARDIOLOGY | Facility: CLINIC | Age: 81
End: 2021-01-05

## 2021-01-06 ENCOUNTER — TELEPHONE (OUTPATIENT)
Dept: FAMILY MEDICINE | Facility: CLINIC | Age: 81
End: 2021-01-06

## 2021-01-06 DIAGNOSIS — N18.9 CHRONIC KIDNEY DISEASE, UNSPECIFIED CKD STAGE: ICD-10-CM

## 2021-01-07 ENCOUNTER — OFFICE VISIT (OUTPATIENT)
Dept: NEUROLOGY | Facility: CLINIC | Age: 81
End: 2021-01-07
Payer: MEDICARE

## 2021-01-07 VITALS
DIASTOLIC BLOOD PRESSURE: 82 MMHG | SYSTOLIC BLOOD PRESSURE: 140 MMHG | RESPIRATION RATE: 16 BRPM | HEIGHT: 66 IN | HEART RATE: 72 BPM | BODY MASS INDEX: 24.66 KG/M2 | WEIGHT: 153.44 LBS

## 2021-01-07 DIAGNOSIS — R29.898 LEFT ARM WEAKNESS: ICD-10-CM

## 2021-01-07 DIAGNOSIS — M25.512 BILATERAL SHOULDER PAIN, UNSPECIFIED CHRONICITY: ICD-10-CM

## 2021-01-07 DIAGNOSIS — L82.1 KERATOSIS, SEBORRHEIC: ICD-10-CM

## 2021-01-07 DIAGNOSIS — E78.2 MIXED HYPERLIPIDEMIA: ICD-10-CM

## 2021-01-07 DIAGNOSIS — M25.511 BILATERAL SHOULDER PAIN, UNSPECIFIED CHRONICITY: ICD-10-CM

## 2021-01-07 DIAGNOSIS — I10 HYPERTENSION, UNSPECIFIED TYPE: ICD-10-CM

## 2021-01-07 DIAGNOSIS — M19.019 ACROMIOCLAVICULAR JOINT ARTHRITIS, UNSPECIFIED LATERALITY: ICD-10-CM

## 2021-01-07 DIAGNOSIS — I34.0 NONRHEUMATIC MITRAL VALVE REGURGITATION: ICD-10-CM

## 2021-01-07 DIAGNOSIS — Z86.73 HISTORY OF STROKE: ICD-10-CM

## 2021-01-07 DIAGNOSIS — S46.911D STRAIN OF RIGHT SHOULDER, SUBSEQUENT ENCOUNTER: ICD-10-CM

## 2021-01-07 DIAGNOSIS — G57.93 NEUROPATHY OF BOTH FEET: ICD-10-CM

## 2021-01-07 DIAGNOSIS — G47.00 INSOMNIA, UNSPECIFIED TYPE: ICD-10-CM

## 2021-01-07 DIAGNOSIS — G31.84 MILD NEUROCOGNITIVE DISORDER: ICD-10-CM

## 2021-01-07 DIAGNOSIS — R42 VERTIGO: ICD-10-CM

## 2021-01-07 DIAGNOSIS — R73.03 PRE-DIABETES: ICD-10-CM

## 2021-01-07 DIAGNOSIS — I87.2 VENOUS INSUFFICIENCY: ICD-10-CM

## 2021-01-07 DIAGNOSIS — G60.8 CSPN (CRYPTOGENIC SENSORY POLYNEUROPATHY): ICD-10-CM

## 2021-01-07 DIAGNOSIS — M19.011 ARTHRITIS OF RIGHT ACROMIOCLAVICULAR JOINT: ICD-10-CM

## 2021-01-07 DIAGNOSIS — R93.89 ABNORMAL MRI: ICD-10-CM

## 2021-01-07 DIAGNOSIS — E78.5 DYSLIPIDEMIA: ICD-10-CM

## 2021-01-07 DIAGNOSIS — R41.3 MEMORY DIFFICULTIES: ICD-10-CM

## 2021-01-07 DIAGNOSIS — R42 DIZZINESS: Primary | ICD-10-CM

## 2021-01-07 DIAGNOSIS — M85.80 OSTEOPENIA, UNSPECIFIED LOCATION: ICD-10-CM

## 2021-01-07 DIAGNOSIS — I95.1 ORTHOSTATIC HYPOTENSION: ICD-10-CM

## 2021-01-07 DIAGNOSIS — I77.9 BILATERAL CAROTID ARTERY DISEASE, UNSPECIFIED TYPE: ICD-10-CM

## 2021-01-07 DIAGNOSIS — Z91.81 AT RISK FOR FALLS: ICD-10-CM

## 2021-01-07 PROCEDURE — 99499 UNLISTED E&M SERVICE: CPT | Mod: S$GLB,,, | Performed by: NURSE PRACTITIONER

## 2021-01-07 PROCEDURE — 3288F FALL RISK ASSESSMENT DOCD: CPT | Mod: CPTII,S$GLB,, | Performed by: NURSE PRACTITIONER

## 2021-01-07 PROCEDURE — 99999 PR PBB SHADOW E&M-EST. PATIENT-LVL IV: CPT | Mod: PBBFAC,,, | Performed by: NURSE PRACTITIONER

## 2021-01-07 PROCEDURE — 1126F PR PAIN SEVERITY QUANTIFIED, NO PAIN PRESENT: ICD-10-PCS | Mod: S$GLB,,, | Performed by: NURSE PRACTITIONER

## 2021-01-07 PROCEDURE — 3077F SYST BP >= 140 MM HG: CPT | Mod: CPTII,S$GLB,, | Performed by: NURSE PRACTITIONER

## 2021-01-07 PROCEDURE — 3079F PR MOST RECENT DIASTOLIC BLOOD PRESSURE 80-89 MM HG: ICD-10-PCS | Mod: CPTII,S$GLB,, | Performed by: NURSE PRACTITIONER

## 2021-01-07 PROCEDURE — 3079F DIAST BP 80-89 MM HG: CPT | Mod: CPTII,S$GLB,, | Performed by: NURSE PRACTITIONER

## 2021-01-07 PROCEDURE — 1100F PR PT FALLS ASSESS DOC 2+ FALLS/FALL W/INJURY/YR: ICD-10-PCS | Mod: CPTII,S$GLB,, | Performed by: NURSE PRACTITIONER

## 2021-01-07 PROCEDURE — 3077F PR MOST RECENT SYSTOLIC BLOOD PRESSURE >= 140 MM HG: ICD-10-PCS | Mod: CPTII,S$GLB,, | Performed by: NURSE PRACTITIONER

## 2021-01-07 PROCEDURE — 1100F PTFALLS ASSESS-DOCD GE2>/YR: CPT | Mod: CPTII,S$GLB,, | Performed by: NURSE PRACTITIONER

## 2021-01-07 PROCEDURE — 3288F PR FALLS RISK ASSESSMENT DOCUMENTED: ICD-10-PCS | Mod: CPTII,S$GLB,, | Performed by: NURSE PRACTITIONER

## 2021-01-07 PROCEDURE — 99214 OFFICE O/P EST MOD 30 MIN: CPT | Mod: S$GLB,,, | Performed by: NURSE PRACTITIONER

## 2021-01-07 PROCEDURE — 99499 RISK ADDL DX/OHS AUDIT: ICD-10-PCS | Mod: S$GLB,,, | Performed by: NURSE PRACTITIONER

## 2021-01-07 PROCEDURE — 1159F PR MEDICATION LIST DOCUMENTED IN MEDICAL RECORD: ICD-10-PCS | Mod: S$GLB,,, | Performed by: NURSE PRACTITIONER

## 2021-01-07 PROCEDURE — 99214 PR OFFICE/OUTPT VISIT, EST, LEVL IV, 30-39 MIN: ICD-10-PCS | Mod: S$GLB,,, | Performed by: NURSE PRACTITIONER

## 2021-01-07 PROCEDURE — 1126F AMNT PAIN NOTED NONE PRSNT: CPT | Mod: S$GLB,,, | Performed by: NURSE PRACTITIONER

## 2021-01-07 PROCEDURE — 99999 PR PBB SHADOW E&M-EST. PATIENT-LVL IV: ICD-10-PCS | Mod: PBBFAC,,, | Performed by: NURSE PRACTITIONER

## 2021-01-07 PROCEDURE — 1159F MED LIST DOCD IN RCRD: CPT | Mod: S$GLB,,, | Performed by: NURSE PRACTITIONER

## 2021-01-08 RX ORDER — EVOLOCUMAB 140 MG/ML
140 INJECTION, SOLUTION SUBCUTANEOUS
Qty: 2 ML | Refills: 7 | OUTPATIENT
Start: 2021-01-08 | End: 2021-01-21 | Stop reason: SDUPTHER

## 2021-01-12 ENCOUNTER — TELEPHONE (OUTPATIENT)
Dept: RHEUMATOLOGY | Facility: CLINIC | Age: 81
End: 2021-01-12
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
## 2021-01-13 ENCOUNTER — TELEPHONE (OUTPATIENT)
Dept: FAMILY MEDICINE | Facility: CLINIC | Age: 81
End: 2021-01-13

## 2021-01-21 ENCOUNTER — SPECIALTY PHARMACY (OUTPATIENT)
Dept: PHARMACY | Facility: CLINIC | Age: 81
End: 2021-01-21

## 2021-01-21 DIAGNOSIS — E78.2 MIXED HYPERLIPIDEMIA: ICD-10-CM

## 2021-01-21 RX ORDER — EVOLOCUMAB 140 MG/ML
140 INJECTION, SOLUTION SUBCUTANEOUS
Qty: 2 ML | Refills: 7 | Status: SHIPPED | OUTPATIENT
Start: 2021-01-21 | End: 2022-02-08

## 2021-01-22 ENCOUNTER — PATIENT MESSAGE (OUTPATIENT)
Dept: ADMINISTRATIVE | Facility: OTHER | Age: 81
End: 2021-01-22

## 2021-02-01 ENCOUNTER — SPECIALTY PHARMACY (OUTPATIENT)
Dept: PHARMACY | Facility: CLINIC | Age: 81
End: 2021-02-01

## 2021-02-01 ENCOUNTER — TELEPHONE (OUTPATIENT)
Dept: RHEUMATOLOGY | Facility: CLINIC | Age: 81
End: 2021-02-01

## 2021-02-10 ENCOUNTER — TELEPHONE (OUTPATIENT)
Dept: FAMILY MEDICINE | Facility: CLINIC | Age: 81
End: 2021-02-10

## 2021-02-11 ENCOUNTER — OFFICE VISIT (OUTPATIENT)
Dept: FAMILY MEDICINE | Facility: CLINIC | Age: 81
End: 2021-02-11
Attending: FAMILY MEDICINE
Payer: MEDICARE

## 2021-02-11 ENCOUNTER — TELEPHONE (OUTPATIENT)
Dept: NEUROLOGY | Facility: CLINIC | Age: 81
End: 2021-02-11

## 2021-02-11 VITALS
HEART RATE: 68 BPM | DIASTOLIC BLOOD PRESSURE: 78 MMHG | WEIGHT: 150.56 LBS | SYSTOLIC BLOOD PRESSURE: 150 MMHG | BODY MASS INDEX: 24.2 KG/M2 | TEMPERATURE: 98 F | OXYGEN SATURATION: 94 % | HEIGHT: 66 IN

## 2021-02-11 DIAGNOSIS — Z86.73 HISTORY OF CVA IN ADULTHOOD: ICD-10-CM

## 2021-02-11 DIAGNOSIS — F32.0 CURRENT MILD EPISODE OF MAJOR DEPRESSIVE DISORDER WITHOUT PRIOR EPISODE: ICD-10-CM

## 2021-02-11 DIAGNOSIS — N18.30 STAGE 3 CHRONIC KIDNEY DISEASE, UNSPECIFIED WHETHER STAGE 3A OR 3B CKD: ICD-10-CM

## 2021-02-11 DIAGNOSIS — R46.0 POOR HYGIENE: ICD-10-CM

## 2021-02-11 DIAGNOSIS — R41.89 COGNITIVE IMPAIRMENT: Primary | ICD-10-CM

## 2021-02-11 DIAGNOSIS — R29.6 FALLS FREQUENTLY: ICD-10-CM

## 2021-02-11 DIAGNOSIS — I10 HYPERTENSION, UNSPECIFIED TYPE: ICD-10-CM

## 2021-02-11 DIAGNOSIS — G57.93 NEUROPATHY OF BOTH FEET: ICD-10-CM

## 2021-02-11 PROCEDURE — 3288F PR FALLS RISK ASSESSMENT DOCUMENTED: ICD-10-PCS | Mod: CPTII,S$GLB,, | Performed by: FAMILY MEDICINE

## 2021-02-11 PROCEDURE — 99214 PR OFFICE/OUTPT VISIT, EST, LEVL IV, 30-39 MIN: ICD-10-PCS | Mod: S$GLB,,, | Performed by: FAMILY MEDICINE

## 2021-02-11 PROCEDURE — 3078F DIAST BP <80 MM HG: CPT | Mod: CPTII,S$GLB,, | Performed by: FAMILY MEDICINE

## 2021-02-11 PROCEDURE — 1159F MED LIST DOCD IN RCRD: CPT | Mod: S$GLB,,, | Performed by: FAMILY MEDICINE

## 2021-02-11 PROCEDURE — 3078F PR MOST RECENT DIASTOLIC BLOOD PRESSURE < 80 MM HG: ICD-10-PCS | Mod: CPTII,S$GLB,, | Performed by: FAMILY MEDICINE

## 2021-02-11 PROCEDURE — 99999 PR PBB SHADOW E&M-EST. PATIENT-LVL V: CPT | Mod: PBBFAC,,, | Performed by: FAMILY MEDICINE

## 2021-02-11 PROCEDURE — 99999 PR PBB SHADOW E&M-EST. PATIENT-LVL V: ICD-10-PCS | Mod: PBBFAC,,, | Performed by: FAMILY MEDICINE

## 2021-02-11 PROCEDURE — 3288F FALL RISK ASSESSMENT DOCD: CPT | Mod: CPTII,S$GLB,, | Performed by: FAMILY MEDICINE

## 2021-02-11 PROCEDURE — 99499 RISK ADDL DX/OHS AUDIT: ICD-10-PCS | Mod: HCNC,S$GLB,, | Performed by: FAMILY MEDICINE

## 2021-02-11 PROCEDURE — 99214 OFFICE O/P EST MOD 30 MIN: CPT | Mod: S$GLB,,, | Performed by: FAMILY MEDICINE

## 2021-02-11 PROCEDURE — 3077F PR MOST RECENT SYSTOLIC BLOOD PRESSURE >= 140 MM HG: ICD-10-PCS | Mod: CPTII,S$GLB,, | Performed by: FAMILY MEDICINE

## 2021-02-11 PROCEDURE — 99499 UNLISTED E&M SERVICE: CPT | Mod: HCNC,S$GLB,, | Performed by: FAMILY MEDICINE

## 2021-02-11 PROCEDURE — 1101F PR PT FALLS ASSESS DOC 0-1 FALLS W/OUT INJ PAST YR: ICD-10-PCS | Mod: CPTII,S$GLB,, | Performed by: FAMILY MEDICINE

## 2021-02-11 PROCEDURE — 1126F AMNT PAIN NOTED NONE PRSNT: CPT | Mod: S$GLB,,, | Performed by: FAMILY MEDICINE

## 2021-02-11 PROCEDURE — 1101F PT FALLS ASSESS-DOCD LE1/YR: CPT | Mod: CPTII,S$GLB,, | Performed by: FAMILY MEDICINE

## 2021-02-11 PROCEDURE — 1126F PR PAIN SEVERITY QUANTIFIED, NO PAIN PRESENT: ICD-10-PCS | Mod: S$GLB,,, | Performed by: FAMILY MEDICINE

## 2021-02-11 PROCEDURE — 1159F PR MEDICATION LIST DOCUMENTED IN MEDICAL RECORD: ICD-10-PCS | Mod: S$GLB,,, | Performed by: FAMILY MEDICINE

## 2021-02-11 PROCEDURE — 3077F SYST BP >= 140 MM HG: CPT | Mod: CPTII,S$GLB,, | Performed by: FAMILY MEDICINE

## 2021-02-11 RX ORDER — SERTRALINE HYDROCHLORIDE 50 MG/1
50 TABLET, FILM COATED ORAL DAILY
Qty: 30 TABLET | Refills: 1 | Status: SHIPPED | OUTPATIENT
Start: 2021-02-11 | End: 2021-03-05

## 2021-02-12 ENCOUNTER — TELEPHONE (OUTPATIENT)
Dept: FAMILY MEDICINE | Facility: CLINIC | Age: 81
End: 2021-02-12

## 2021-02-12 ENCOUNTER — TELEPHONE (OUTPATIENT)
Dept: RHEUMATOLOGY | Facility: CLINIC | Age: 81
End: 2021-02-12

## 2021-02-12 PROCEDURE — G0180 PR HOME HEALTH MD CERTIFICATION: ICD-10-PCS | Mod: ,,, | Performed by: FAMILY MEDICINE

## 2021-02-12 PROCEDURE — G0180 MD CERTIFICATION HHA PATIENT: HCPCS | Mod: ,,, | Performed by: FAMILY MEDICINE

## 2021-02-22 ENCOUNTER — EXTERNAL HOME HEALTH (OUTPATIENT)
Dept: HOME HEALTH SERVICES | Facility: HOSPITAL | Age: 81
End: 2021-02-22
Payer: MEDICARE

## 2021-02-23 ENCOUNTER — DOCUMENT SCAN (OUTPATIENT)
Dept: HOME HEALTH SERVICES | Facility: HOSPITAL | Age: 81
End: 2021-02-23
Payer: MEDICARE

## 2021-03-02 ENCOUNTER — TELEPHONE (OUTPATIENT)
Dept: FAMILY MEDICINE | Facility: CLINIC | Age: 81
End: 2021-03-02

## 2021-03-03 ENCOUNTER — DOCUMENT SCAN (OUTPATIENT)
Dept: HOME HEALTH SERVICES | Facility: HOSPITAL | Age: 81
End: 2021-03-03
Payer: MEDICARE

## 2021-03-06 ENCOUNTER — SPECIALTY PHARMACY (OUTPATIENT)
Dept: PHARMACY | Facility: CLINIC | Age: 81
End: 2021-03-06

## 2021-03-08 ENCOUNTER — DOCUMENT SCAN (OUTPATIENT)
Dept: HOME HEALTH SERVICES | Facility: HOSPITAL | Age: 81
End: 2021-03-08
Payer: MEDICARE

## 2021-03-09 ENCOUNTER — TELEPHONE (OUTPATIENT)
Dept: FAMILY MEDICINE | Facility: CLINIC | Age: 81
End: 2021-03-09

## 2021-03-19 ENCOUNTER — TELEPHONE (OUTPATIENT)
Dept: RHEUMATOLOGY | Facility: CLINIC | Age: 81
End: 2021-03-19

## 2021-03-22 ENCOUNTER — TELEPHONE (OUTPATIENT)
Dept: RHEUMATOLOGY | Facility: CLINIC | Age: 81
End: 2021-03-22

## 2021-04-07 ENCOUNTER — HOSPITAL ENCOUNTER (OUTPATIENT)
Dept: RADIOLOGY | Facility: HOSPITAL | Age: 81
Discharge: HOME | End: 2021-04-07
Attending: NURSE PRACTITIONER
Payer: MEDICARE

## 2021-04-07 ENCOUNTER — OFFICE VISIT (OUTPATIENT)
Dept: NEUROLOGY | Facility: CLINIC | Age: 81
End: 2021-04-07
Payer: MEDICARE

## 2021-04-07 ENCOUNTER — TELEPHONE (OUTPATIENT)
Dept: NEUROLOGY | Facility: CLINIC | Age: 81
End: 2021-04-07

## 2021-04-07 VITALS
HEART RATE: 70 BPM | RESPIRATION RATE: 16 BRPM | WEIGHT: 160.06 LBS | DIASTOLIC BLOOD PRESSURE: 82 MMHG | BODY MASS INDEX: 25.72 KG/M2 | HEIGHT: 66 IN | SYSTOLIC BLOOD PRESSURE: 142 MMHG

## 2021-04-07 DIAGNOSIS — F01.50 MIXED DEMENTIA: Primary | ICD-10-CM

## 2021-04-07 DIAGNOSIS — G60.8 CSPN (CRYPTOGENIC SENSORY POLYNEUROPATHY): ICD-10-CM

## 2021-04-07 DIAGNOSIS — M19.019 ACROMIOCLAVICULAR JOINT ARTHRITIS, UNSPECIFIED LATERALITY: ICD-10-CM

## 2021-04-07 DIAGNOSIS — G31.84 MILD NEUROCOGNITIVE DISORDER: ICD-10-CM

## 2021-04-07 DIAGNOSIS — G47.00 INSOMNIA, UNSPECIFIED TYPE: ICD-10-CM
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
## 2021-04-07 DIAGNOSIS — M19.011 ARTHRITIS OF RIGHT ACROMIOCLAVICULAR JOINT: ICD-10-CM

## 2021-04-07 DIAGNOSIS — R42 DIZZINESS: ICD-10-CM

## 2021-04-07 DIAGNOSIS — F02.80 MIXED DEMENTIA: Primary | ICD-10-CM

## 2021-04-07 DIAGNOSIS — M25.511 BILATERAL SHOULDER PAIN, UNSPECIFIED CHRONICITY: ICD-10-CM

## 2021-04-07 DIAGNOSIS — R73.03 PRE-DIABETES: Chronic | ICD-10-CM

## 2021-04-07 DIAGNOSIS — S46.911D STRAIN OF RIGHT SHOULDER, SUBSEQUENT ENCOUNTER: ICD-10-CM

## 2021-04-07 DIAGNOSIS — I95.1 ORTHOSTATIC HYPOTENSION: ICD-10-CM

## 2021-04-07 DIAGNOSIS — I87.2 VENOUS INSUFFICIENCY: Chronic | ICD-10-CM

## 2021-04-07 DIAGNOSIS — M79.672 LEFT FOOT PAIN: ICD-10-CM

## 2021-04-07 DIAGNOSIS — I10 HYPERTENSION, UNSPECIFIED TYPE: Chronic | ICD-10-CM

## 2021-04-07 DIAGNOSIS — R29.898 LEFT ARM WEAKNESS: ICD-10-CM

## 2021-04-07 DIAGNOSIS — L82.1 KERATOSIS, SEBORRHEIC: ICD-10-CM

## 2021-04-07 DIAGNOSIS — R93.89 ABNORMAL MRI: ICD-10-CM

## 2021-04-07 DIAGNOSIS — R60.0 EDEMA OF LEFT FOOT: ICD-10-CM

## 2021-04-07 DIAGNOSIS — I34.0 NONRHEUMATIC MITRAL VALVE REGURGITATION: ICD-10-CM

## 2021-04-07 DIAGNOSIS — G30.9 MIXED DEMENTIA: Primary | ICD-10-CM

## 2021-04-07 DIAGNOSIS — M25.512 BILATERAL SHOULDER PAIN, UNSPECIFIED CHRONICITY: ICD-10-CM

## 2021-04-07 DIAGNOSIS — M85.80 OSTEOPENIA, UNSPECIFIED LOCATION: ICD-10-CM

## 2021-04-07 DIAGNOSIS — G57.93 NEUROPATHY OF BOTH FEET: ICD-10-CM

## 2021-04-07 DIAGNOSIS — R42 VERTIGO: ICD-10-CM

## 2021-04-07 DIAGNOSIS — E78.5 DYSLIPIDEMIA: Chronic | ICD-10-CM

## 2021-04-07 DIAGNOSIS — R41.3 MEMORY DIFFICULTIES: ICD-10-CM

## 2021-04-07 DIAGNOSIS — I77.9 BILATERAL CAROTID ARTERY DISEASE, UNSPECIFIED TYPE: Chronic | ICD-10-CM

## 2021-04-07 DIAGNOSIS — Z86.73 HISTORY OF STROKE: Chronic | ICD-10-CM

## 2021-04-07 DIAGNOSIS — Z91.81 AT RISK FOR FALLS: ICD-10-CM
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
## 2021-04-07 PROCEDURE — 1126F PR PAIN SEVERITY QUANTIFIED, NO PAIN PRESENT: ICD-10-PCS | Mod: S$GLB,,, | Performed by: NURSE PRACTITIONER

## 2021-04-07 PROCEDURE — 1126F AMNT PAIN NOTED NONE PRSNT: CPT | Mod: S$GLB,,, | Performed by: NURSE PRACTITIONER

## 2021-04-07 PROCEDURE — 73630 XR FOOT COMPLETE 3 VIEW LEFT: ICD-10-PCS | Mod: 26,LT,, | Performed by: RADIOLOGY

## 2021-04-07 PROCEDURE — 99499 UNLISTED E&M SERVICE: CPT | Mod: S$GLB,,, | Performed by: NURSE PRACTITIONER

## 2021-04-07 PROCEDURE — 3077F SYST BP >= 140 MM HG: CPT | Mod: CPTII,S$GLB,, | Performed by: NURSE PRACTITIONER

## 2021-04-07 PROCEDURE — 99215 OFFICE O/P EST HI 40 MIN: CPT | Mod: S$GLB,,, | Performed by: NURSE PRACTITIONER

## 2021-04-07 PROCEDURE — 99999 PR PBB SHADOW E&M-EST. PATIENT-LVL IV: CPT | Mod: PBBFAC,,, | Performed by: NURSE PRACTITIONER

## 2021-04-07 PROCEDURE — 3079F PR MOST RECENT DIASTOLIC BLOOD PRESSURE 80-89 MM HG: ICD-10-PCS | Mod: CPTII,S$GLB,, | Performed by: NURSE PRACTITIONER

## 2021-04-07 PROCEDURE — 1159F MED LIST DOCD IN RCRD: CPT | Mod: S$GLB,,, | Performed by: NURSE PRACTITIONER

## 2021-04-07 PROCEDURE — 73610 X-RAY EXAM OF ANKLE: CPT | Mod: 26,LT,, | Performed by: RADIOLOGY

## 2021-04-07 PROCEDURE — 99999 PR PBB SHADOW E&M-EST. PATIENT-LVL IV: ICD-10-PCS | Mod: PBBFAC,,, | Performed by: NURSE PRACTITIONER

## 2021-04-07 PROCEDURE — 1101F PT FALLS ASSESS-DOCD LE1/YR: CPT | Mod: CPTII,S$GLB,, | Performed by: NURSE PRACTITIONER

## 2021-04-07 PROCEDURE — 73610 XR ANKLE COMPLETE 3 VIEW LEFT: ICD-10-PCS | Mod: 26,LT,, | Performed by: RADIOLOGY

## 2021-04-07 PROCEDURE — 3079F DIAST BP 80-89 MM HG: CPT | Mod: CPTII,S$GLB,, | Performed by: NURSE PRACTITIONER

## 2021-04-07 PROCEDURE — 1159F PR MEDICATION LIST DOCUMENTED IN MEDICAL RECORD: ICD-10-PCS | Mod: S$GLB,,, | Performed by: NURSE PRACTITIONER

## 2021-04-07 PROCEDURE — 3288F FALL RISK ASSESSMENT DOCD: CPT | Mod: CPTII,S$GLB,, | Performed by: NURSE PRACTITIONER

## 2021-04-07 PROCEDURE — 3288F PR FALLS RISK ASSESSMENT DOCUMENTED: ICD-10-PCS | Mod: CPTII,S$GLB,, | Performed by: NURSE PRACTITIONER

## 2021-04-07 PROCEDURE — 1101F PR PT FALLS ASSESS DOC 0-1 FALLS W/OUT INJ PAST YR: ICD-10-PCS | Mod: CPTII,S$GLB,, | Performed by: NURSE PRACTITIONER

## 2021-04-07 PROCEDURE — 73630 X-RAY EXAM OF FOOT: CPT | Mod: TC,LT

## 2021-04-07 PROCEDURE — 3077F PR MOST RECENT SYSTOLIC BLOOD PRESSURE >= 140 MM HG: ICD-10-PCS | Mod: CPTII,S$GLB,, | Performed by: NURSE PRACTITIONER

## 2021-04-07 PROCEDURE — 99499 RISK ADDL DX/OHS AUDIT: ICD-10-PCS | Mod: S$GLB,,, | Performed by: NURSE PRACTITIONER

## 2021-04-07 PROCEDURE — 99215 PR OFFICE/OUTPT VISIT, EST, LEVL V, 40-54 MIN: ICD-10-PCS | Mod: S$GLB,,, | Performed by: NURSE PRACTITIONER

## 2021-04-07 PROCEDURE — 73610 X-RAY EXAM OF ANKLE: CPT | Mod: TC,LT

## 2021-04-07 PROCEDURE — 73630 X-RAY EXAM OF FOOT: CPT | Mod: 26,LT,, | Performed by: RADIOLOGY

## 2021-04-07 RX ORDER — MEMANTINE HYDROCHLORIDE 10 MG/1
10 TABLET ORAL 2 TIMES DAILY
Qty: 180 TABLET | Refills: 11 | Status: SHIPPED | OUTPATIENT
Start: 2021-04-07

## 2021-04-07 RX ORDER — GABAPENTIN 400 MG/1
400 CAPSULE ORAL NIGHTLY
Qty: 90 CAPSULE | Refills: 0 | Status: SHIPPED | OUTPATIENT
Start: 2021-04-07 | End: 2021-06-08

## 2021-04-07 RX ORDER — DONEPEZIL HYDROCHLORIDE 10 MG/1
10 TABLET, FILM COATED ORAL NIGHTLY
Qty: 90 TABLET | Refills: 3 | Status: SHIPPED | OUTPATIENT
Start: 2021-04-07

## 2021-04-07 RX ORDER — TRAZODONE HYDROCHLORIDE 50 MG/1
50 TABLET ORAL NIGHTLY
Qty: 90 TABLET | Refills: 3 | Status: SHIPPED | OUTPATIENT
Start: 2021-04-07

## 2021-04-08 ENCOUNTER — TELEPHONE (OUTPATIENT)
Dept: NEUROLOGY | Facility: CLINIC | Age: 81
End: 2021-04-08

## 2021-04-14 ENCOUNTER — HOSPITAL ENCOUNTER (OUTPATIENT)
Dept: RADIOLOGY | Facility: HOSPITAL | Age: 81
Discharge: HOME | End: 2021-04-14
Attending: FAMILY MEDICINE
Payer: MEDICARE

## 2021-04-14 ENCOUNTER — OFFICE VISIT (OUTPATIENT)
Dept: FAMILY MEDICINE | Facility: CLINIC | Age: 81
End: 2021-04-14
Attending: FAMILY MEDICINE
Payer: MEDICARE

## 2021-04-14 ENCOUNTER — TELEPHONE (OUTPATIENT)
Dept: FAMILY MEDICINE | Facility: CLINIC | Age: 81
End: 2021-04-14

## 2021-04-14 ENCOUNTER — CLINICAL SUPPORT (OUTPATIENT)
Dept: AUDIOLOGY | Facility: CLINIC | Age: 81
End: 2021-04-14
Payer: MEDICARE

## 2021-04-14 ENCOUNTER — OFFICE VISIT (OUTPATIENT)
Dept: OTOLARYNGOLOGY | Facility: CLINIC | Age: 81
End: 2021-04-14
Payer: MEDICARE

## 2021-04-14 VITALS
DIASTOLIC BLOOD PRESSURE: 80 MMHG | BODY MASS INDEX: 25.75 KG/M2 | SYSTOLIC BLOOD PRESSURE: 130 MMHG | HEIGHT: 66 IN | WEIGHT: 160.25 LBS | OXYGEN SATURATION: 95 % | HEART RATE: 73 BPM | TEMPERATURE: 98 F

## 2021-04-14 VITALS — BODY MASS INDEX: 25.66 KG/M2 | TEMPERATURE: 98 F | HEIGHT: 66 IN | WEIGHT: 159.63 LBS

## 2021-04-14 DIAGNOSIS — R41.89 COGNITIVE IMPAIRMENT: ICD-10-CM

## 2021-04-14 DIAGNOSIS — F32.2 CURRENT SEVERE EPISODE OF MAJOR DEPRESSIVE DISORDER WITHOUT PSYCHOTIC FEATURES WITHOUT PRIOR EPISODE: ICD-10-CM

## 2021-04-14 DIAGNOSIS — M79.672 LEFT FOOT PAIN: Primary | ICD-10-CM

## 2021-04-14 DIAGNOSIS — R42 DIZZINESS: ICD-10-CM

## 2021-04-14 DIAGNOSIS — R42 DIZZINESS: Primary | ICD-10-CM

## 2021-04-14 DIAGNOSIS — F32.1 CURRENT MODERATE EPISODE OF MAJOR DEPRESSIVE DISORDER WITHOUT PRIOR EPISODE: Primary | ICD-10-CM

## 2021-04-14 DIAGNOSIS — H61.21 IMPACTED CERUMEN OF RIGHT EAR: Primary | ICD-10-CM

## 2021-04-14 DIAGNOSIS — M79.672 LEFT FOOT PAIN: ICD-10-CM

## 2021-04-14 DIAGNOSIS — H61.21 RIGHT EAR IMPACTED CERUMEN: ICD-10-CM

## 2021-04-14 PROCEDURE — 99214 PR OFFICE/OUTPT VISIT, EST, LEVL IV, 30-39 MIN: ICD-10-PCS | Mod: S$GLB,,, | Performed by: FAMILY MEDICINE

## 2021-04-14 PROCEDURE — 73630 X-RAY EXAM OF FOOT: CPT | Mod: TC,PO,LT

## 2021-04-14 PROCEDURE — 99499 RISK ADDL DX/OHS AUDIT: ICD-10-PCS | Mod: HCNC,S$GLB,, | Performed by: FAMILY MEDICINE

## 2021-04-14 PROCEDURE — 73630 X-RAY EXAM OF FOOT: CPT | Mod: 26,LT,, | Performed by: RADIOLOGY

## 2021-04-14 PROCEDURE — 1159F PR MEDICATION LIST DOCUMENTED IN MEDICAL RECORD: ICD-10-PCS | Mod: S$GLB,,, | Performed by: FAMILY MEDICINE

## 2021-04-14 PROCEDURE — 73630 XR FOOT COMPLETE 3 VIEW LEFT: ICD-10-PCS | Mod: 26,LT,, | Performed by: RADIOLOGY

## 2021-04-14 PROCEDURE — 1125F PR PAIN SEVERITY QUANTIFIED, PAIN PRESENT: ICD-10-PCS | Mod: S$GLB,,, | Performed by: FAMILY MEDICINE

## 2021-04-14 PROCEDURE — 3288F PR FALLS RISK ASSESSMENT DOCUMENTED: ICD-10-PCS | Mod: CPTII,S$GLB,, | Performed by: FAMILY MEDICINE

## 2021-04-14 PROCEDURE — 1126F PR PAIN SEVERITY QUANTIFIED, NO PAIN PRESENT: ICD-10-PCS | Mod: S$GLB,,, | Performed by: PHYSICIAN ASSISTANT

## 2021-04-14 PROCEDURE — 1159F MED LIST DOCD IN RCRD: CPT | Mod: S$GLB,,, | Performed by: PHYSICIAN ASSISTANT

## 2021-04-14 PROCEDURE — 1159F PR MEDICATION LIST DOCUMENTED IN MEDICAL RECORD: ICD-10-PCS | Mod: S$GLB,,, | Performed by: PHYSICIAN ASSISTANT

## 2021-04-14 PROCEDURE — 99214 OFFICE O/P EST MOD 30 MIN: CPT | Mod: S$GLB,,, | Performed by: FAMILY MEDICINE

## 2021-04-14 PROCEDURE — 99499 UNLISTED E&M SERVICE: CPT | Mod: HCNC,S$GLB,, | Performed by: FAMILY MEDICINE

## 2021-04-14 PROCEDURE — 1159F MED LIST DOCD IN RCRD: CPT | Mod: S$GLB,,, | Performed by: FAMILY MEDICINE

## 2021-04-14 PROCEDURE — 69210 REMOVE IMPACTED EAR WAX UNI: CPT | Mod: S$GLB,,, | Performed by: PHYSICIAN ASSISTANT

## 2021-04-14 PROCEDURE — 69210 PR REMOVAL IMPACTED CERUMEN REQUIRING INSTRUMENTATION, UNILATERAL: ICD-10-PCS | Mod: S$GLB,,, | Performed by: PHYSICIAN ASSISTANT

## 2021-04-14 PROCEDURE — 99999 PR PBB SHADOW E&M-EST. PATIENT-LVL IV: CPT | Mod: PBBFAC,,, | Performed by: PHYSICIAN ASSISTANT

## 2021-04-14 PROCEDURE — 99204 OFFICE O/P NEW MOD 45 MIN: CPT | Mod: 25,S$GLB,, | Performed by: PHYSICIAN ASSISTANT

## 2021-04-14 PROCEDURE — 99999 PR PBB SHADOW E&M-EST. PATIENT-LVL IV: ICD-10-PCS | Mod: PBBFAC,,, | Performed by: PHYSICIAN ASSISTANT

## 2021-04-14 PROCEDURE — 99999 PR PBB SHADOW E&M-EST. PATIENT-LVL IV: CPT | Mod: PBBFAC,,, | Performed by: FAMILY MEDICINE

## 2021-04-14 PROCEDURE — 1101F PR PT FALLS ASSESS DOC 0-1 FALLS W/OUT INJ PAST YR: ICD-10-PCS | Mod: CPTII,S$GLB,, | Performed by: FAMILY MEDICINE

## 2021-04-14 PROCEDURE — 1125F AMNT PAIN NOTED PAIN PRSNT: CPT | Mod: S$GLB,,, | Performed by: FAMILY MEDICINE

## 2021-04-14 PROCEDURE — 99999 PR PBB SHADOW E&M-EST. PATIENT-LVL IV: ICD-10-PCS | Mod: PBBFAC,,, | Performed by: FAMILY MEDICINE

## 2021-04-14 PROCEDURE — 1126F AMNT PAIN NOTED NONE PRSNT: CPT | Mod: S$GLB,,, | Performed by: PHYSICIAN ASSISTANT

## 2021-04-14 PROCEDURE — 1101F PT FALLS ASSESS-DOCD LE1/YR: CPT | Mod: CPTII,S$GLB,, | Performed by: FAMILY MEDICINE

## 2021-04-14 PROCEDURE — 99204 PR OFFICE/OUTPT VISIT, NEW, LEVL IV, 45-59 MIN: ICD-10-PCS | Mod: 25,S$GLB,, | Performed by: PHYSICIAN ASSISTANT

## 2021-04-14 PROCEDURE — 3288F FALL RISK ASSESSMENT DOCD: CPT | Mod: CPTII,S$GLB,, | Performed by: FAMILY MEDICINE

## 2021-04-14 RX ORDER — SERTRALINE HYDROCHLORIDE 100 MG/1
TABLET, FILM COATED ORAL
Qty: 30 TABLET | Refills: 1 | Status: SHIPPED | OUTPATIENT
Start: 2021-04-14 | End: 2021-05-10

## 2021-04-15 ENCOUNTER — TELEPHONE (OUTPATIENT)
Dept: FAMILY MEDICINE | Facility: CLINIC | Age: 81
End: 2021-04-15

## 2021-04-15 ENCOUNTER — OFFICE VISIT (OUTPATIENT)
Dept: PODIATRY | Facility: CLINIC | Age: 81
End: 2021-04-15
Payer: MEDICARE

## 2021-04-15 VITALS — HEIGHT: 66 IN | WEIGHT: 160.25 LBS | BODY MASS INDEX: 25.75 KG/M2

## 2021-04-15 DIAGNOSIS — I87.2 VENOUS INSUFFICIENCY: Chronic | ICD-10-CM

## 2021-04-15 DIAGNOSIS — S90.32XS CONTUSION OF LEFT FOOT, SEQUELA: Primary | ICD-10-CM

## 2021-04-15 DIAGNOSIS — M25.572 PAIN IN LEFT ANKLE AND JOINTS OF LEFT FOOT: ICD-10-CM

## 2021-04-15 DIAGNOSIS — S90.32XA HEMATOMA OF LEFT FOOT: ICD-10-CM

## 2021-04-15 PROCEDURE — 1100F PTFALLS ASSESS-DOCD GE2>/YR: CPT | Mod: CPTII,S$GLB,, | Performed by: PODIATRIST

## 2021-04-15 PROCEDURE — 3288F PR FALLS RISK ASSESSMENT DOCUMENTED: ICD-10-PCS | Mod: CPTII,S$GLB,, | Performed by: PODIATRIST

## 2021-04-15 PROCEDURE — 1125F AMNT PAIN NOTED PAIN PRSNT: CPT | Mod: S$GLB,,, | Performed by: PODIATRIST

## 2021-04-15 PROCEDURE — 1159F PR MEDICATION LIST DOCUMENTED IN MEDICAL RECORD: ICD-10-PCS | Mod: S$GLB,,, | Performed by: PODIATRIST

## 2021-04-15 PROCEDURE — 1100F PR PT FALLS ASSESS DOC 2+ FALLS/FALL W/INJURY/YR: ICD-10-PCS | Mod: CPTII,S$GLB,, | Performed by: PODIATRIST

## 2021-04-15 PROCEDURE — 3288F FALL RISK ASSESSMENT DOCD: CPT | Mod: CPTII,S$GLB,, | Performed by: PODIATRIST

## 2021-04-15 PROCEDURE — 99213 OFFICE O/P EST LOW 20 MIN: CPT | Mod: S$GLB,,, | Performed by: PODIATRIST

## 2021-04-15 PROCEDURE — 99213 PR OFFICE/OUTPT VISIT, EST, LEVL III, 20-29 MIN: ICD-10-PCS | Mod: S$GLB,,, | Performed by: PODIATRIST

## 2021-04-15 PROCEDURE — 99999 PR PBB SHADOW E&M-EST. PATIENT-LVL III: CPT | Mod: PBBFAC,,, | Performed by: PODIATRIST

## 2021-04-15 PROCEDURE — 1159F MED LIST DOCD IN RCRD: CPT | Mod: S$GLB,,, | Performed by: PODIATRIST

## 2021-04-15 PROCEDURE — 1125F PR PAIN SEVERITY QUANTIFIED, PAIN PRESENT: ICD-10-PCS | Mod: S$GLB,,, | Performed by: PODIATRIST

## 2021-04-15 PROCEDURE — 99999 PR PBB SHADOW E&M-EST. PATIENT-LVL III: ICD-10-PCS | Mod: PBBFAC,,, | Performed by: PODIATRIST

## 2021-04-22 ENCOUNTER — TELEPHONE (OUTPATIENT)
Dept: ADMINISTRATIVE | Facility: HOSPITAL | Age: 81
End: 2021-04-22

## 2021-04-22 ENCOUNTER — SPECIALTY PHARMACY (OUTPATIENT)
Dept: PHARMACY | Facility: CLINIC | Age: 81
End: 2021-04-22

## 2021-05-03 ENCOUNTER — OFFICE VISIT (OUTPATIENT)
Dept: CARDIOLOGY | Facility: CLINIC | Age: 81
End: 2021-05-03
Payer: MEDICARE

## 2021-05-03 ENCOUNTER — CLINICAL SUPPORT (OUTPATIENT)
Dept: AUDIOLOGY | Facility: CLINIC | Age: 81
End: 2021-05-03
Payer: MEDICARE

## 2021-05-03 VITALS
BODY MASS INDEX: 25.44 KG/M2 | HEART RATE: 73 BPM | DIASTOLIC BLOOD PRESSURE: 62 MMHG | WEIGHT: 157.63 LBS | SYSTOLIC BLOOD PRESSURE: 122 MMHG | OXYGEN SATURATION: 95 %

## 2021-05-03 DIAGNOSIS — R41.3 MEMORY DIFFICULTIES: ICD-10-CM

## 2021-05-03 DIAGNOSIS — I34.0 NONRHEUMATIC MITRAL VALVE REGURGITATION: ICD-10-CM

## 2021-05-03 DIAGNOSIS — E78.5 DYSLIPIDEMIA: Chronic | ICD-10-CM

## 2021-05-03 DIAGNOSIS — R42 DIZZINESS: ICD-10-CM

## 2021-05-03 DIAGNOSIS — G57.93 NEUROPATHY OF BOTH FEET: ICD-10-CM

## 2021-05-03 DIAGNOSIS — Z86.73 HISTORY OF STROKE: ICD-10-CM

## 2021-05-03 DIAGNOSIS — I95.1 ORTHOSTATIC HYPOTENSION: ICD-10-CM

## 2021-05-03 DIAGNOSIS — I65.23 BILATERAL CAROTID ARTERY STENOSIS: Chronic | ICD-10-CM

## 2021-05-03 DIAGNOSIS — R42 DIZZINESS: Primary | ICD-10-CM

## 2021-05-03 DIAGNOSIS — H90.3 SENSORINEURAL HEARING LOSS, BILATERAL: Primary | ICD-10-CM

## 2021-05-03 DIAGNOSIS — I10 ESSENTIAL HYPERTENSION: Primary | Chronic | ICD-10-CM

## 2021-05-03 DIAGNOSIS — H90.3 SENSORINEURAL HEARING LOSS, BILATERAL: ICD-10-CM

## 2021-05-03 DIAGNOSIS — Z86.73 HISTORY OF STROKE: Chronic | ICD-10-CM

## 2021-05-03 PROCEDURE — 92557 PR COMPREHENSIVE HEARING TEST: ICD-10-PCS | Mod: S$GLB,,, | Performed by: AUDIOLOGIST-HEARING AID FITTER

## 2021-05-03 PROCEDURE — 3078F DIAST BP <80 MM HG: CPT | Mod: CPTII,S$GLB,, | Performed by: INTERNAL MEDICINE

## 2021-05-03 PROCEDURE — 99214 PR OFFICE/OUTPT VISIT, EST, LEVL IV, 30-39 MIN: ICD-10-PCS | Mod: S$GLB,,, | Performed by: INTERNAL MEDICINE

## 2021-05-03 PROCEDURE — 99214 OFFICE O/P EST MOD 30 MIN: CPT | Mod: S$GLB,,, | Performed by: INTERNAL MEDICINE

## 2021-05-03 PROCEDURE — 3074F PR MOST RECENT SYSTOLIC BLOOD PRESSURE < 130 MM HG: ICD-10-PCS | Mod: CPTII,S$GLB,, | Performed by: INTERNAL MEDICINE

## 2021-05-03 PROCEDURE — 92540 BASIC VESTIBULAR EVALUATION: CPT | Mod: S$GLB,,, | Performed by: AUDIOLOGIST-HEARING AID FITTER

## 2021-05-03 PROCEDURE — 3078F PR MOST RECENT DIASTOLIC BLOOD PRESSURE < 80 MM HG: ICD-10-PCS | Mod: CPTII,S$GLB,, | Performed by: INTERNAL MEDICINE

## 2021-05-03 PROCEDURE — 1159F MED LIST DOCD IN RCRD: CPT | Mod: S$GLB,,, | Performed by: INTERNAL MEDICINE

## 2021-05-03 PROCEDURE — 92557 COMPREHENSIVE HEARING TEST: CPT | Mod: S$GLB,,, | Performed by: AUDIOLOGIST-HEARING AID FITTER

## 2021-05-03 PROCEDURE — 92537 PR CALORIC VSTBLR TEST W/REC BITHERMAL: ICD-10-PCS | Mod: S$GLB,,, | Performed by: AUDIOLOGIST-HEARING AID FITTER

## 2021-05-03 PROCEDURE — 1159F PR MEDICATION LIST DOCUMENTED IN MEDICAL RECORD: ICD-10-PCS | Mod: S$GLB,,, | Performed by: INTERNAL MEDICINE

## 2021-05-03 PROCEDURE — 92567 PR TYMPA2METRY: ICD-10-PCS | Mod: S$GLB,,, | Performed by: AUDIOLOGIST-HEARING AID FITTER

## 2021-05-03 PROCEDURE — 99999 PR PBB SHADOW E&M-EST. PATIENT-LVL III: ICD-10-PCS | Mod: PBBFAC,,, | Performed by: INTERNAL MEDICINE

## 2021-05-03 PROCEDURE — 92567 TYMPANOMETRY: CPT | Mod: S$GLB,,, | Performed by: AUDIOLOGIST-HEARING AID FITTER

## 2021-05-03 PROCEDURE — 92537 CALORIC VSTBLR TEST W/REC: CPT | Mod: S$GLB,,, | Performed by: AUDIOLOGIST-HEARING AID FITTER

## 2021-05-03 PROCEDURE — 3074F SYST BP LT 130 MM HG: CPT | Mod: CPTII,S$GLB,, | Performed by: INTERNAL MEDICINE

## 2021-05-03 PROCEDURE — 99999 PR PBB SHADOW E&M-EST. PATIENT-LVL III: CPT | Mod: PBBFAC,,, | Performed by: INTERNAL MEDICINE

## 2021-05-03 PROCEDURE — 92540 PR VESTIBULAR EVAL NYSTAG FOVL&PERPH STIM OSCIL TRACKING: ICD-10-PCS | Mod: S$GLB,,, | Performed by: AUDIOLOGIST-HEARING AID FITTER

## 2021-05-04 ENCOUNTER — LAB VISIT (OUTPATIENT)
Dept: LAB | Facility: HOSPITAL | Age: 81
End: 2021-05-04
Attending: INTERNAL MEDICINE
Payer: MEDICARE

## 2021-05-04 DIAGNOSIS — E78.5 DYSLIPIDEMIA: Chronic | ICD-10-CM

## 2021-05-04 LAB
CHOLEST SERPL-MCNC: 146 MG/DL (ref 120–199)
CHOLEST/HDLC SERPL: 2.9 {RATIO} (ref 2–5)
HDLC SERPL-MCNC: 51 MG/DL (ref 40–75)
HDLC SERPL: 34.9 % (ref 20–50)
LDLC SERPL CALC-MCNC: 75 MG/DL (ref 63–159)
NONHDLC SERPL-MCNC: 95 MG/DL
TRIGL SERPL-MCNC: 100 MG/DL (ref 30–150)

## 2021-05-04 PROCEDURE — 36415 COLL VENOUS BLD VENIPUNCTURE: CPT | Mod: PO | Performed by: INTERNAL MEDICINE

## 2021-05-04 PROCEDURE — 80061 LIPID PANEL: CPT | Performed by: INTERNAL MEDICINE

## 2021-05-05 ENCOUNTER — TELEPHONE (OUTPATIENT)
Dept: CARDIOLOGY | Facility: CLINIC | Age: 81
End: 2021-05-05

## 2021-05-10 ENCOUNTER — TELEPHONE (OUTPATIENT)
Dept: RHEUMATOLOGY | Facility: CLINIC | Age: 81
End: 2021-05-10

## 2021-05-19 ENCOUNTER — SPECIALTY PHARMACY (OUTPATIENT)
Dept: PHARMACY | Facility: CLINIC | Age: 81
End: 2021-05-19

## 2021-05-28 ENCOUNTER — TELEPHONE (OUTPATIENT)
Dept: FAMILY MEDICINE | Facility: CLINIC | Age: 81
End: 2021-05-28

## 2021-06-23 RX ORDER — SERTRALINE HYDROCHLORIDE 100 MG/1
TABLET, FILM COATED ORAL
Qty: 30 TABLET | Refills: 0 | Status: SHIPPED | OUTPATIENT
Start: 2021-06-23 | End: 2021-09-01

## 2021-07-01 ENCOUNTER — PATIENT MESSAGE (OUTPATIENT)
Dept: ADMINISTRATIVE | Facility: OTHER | Age: 81
End: 2021-07-01

## 2021-07-08 ENCOUNTER — PATIENT MESSAGE (OUTPATIENT)
Dept: PHARMACY | Facility: CLINIC | Age: 81
End: 2021-07-08

## 2021-07-15 ENCOUNTER — OFFICE VISIT (OUTPATIENT)
Dept: PODIATRY | Facility: CLINIC | Age: 81
End: 2021-07-15
Payer: MEDICARE

## 2021-07-15 ENCOUNTER — HOSPITAL ENCOUNTER (OUTPATIENT)
Dept: RADIOLOGY | Facility: HOSPITAL | Age: 81
Discharge: HOME | End: 2021-07-15
Attending: PODIATRIST
Payer: MEDICARE

## 2021-07-15 VITALS — BODY MASS INDEX: 26.93 KG/M2 | HEIGHT: 66 IN | WEIGHT: 167.56 LBS

## 2021-07-15 DIAGNOSIS — S93.412S SPRAIN OF CALCANEOFIBULAR LIGAMENT OF LEFT ANKLE, SEQUELA: ICD-10-CM

## 2021-07-15 DIAGNOSIS — M19.072 OSTEOARTHRITIS OF LEFT ANKLE OR FOOT: ICD-10-CM

## 2021-07-15 DIAGNOSIS — M25.572 PAIN IN LEFT ANKLE AND JOINTS OF LEFT FOOT: Primary | ICD-10-CM

## 2021-07-15 DIAGNOSIS — S93.492S SPRAIN OF ANTERIOR TALOFIBULAR LIGAMENT, LEFT, SEQUELA: ICD-10-CM

## 2021-07-15 DIAGNOSIS — I87.2 VENOUS INSUFFICIENCY OF BOTH LOWER EXTREMITIES: ICD-10-CM

## 2021-07-15 DIAGNOSIS — M25.572 ACUTE LEFT ANKLE PAIN: ICD-10-CM

## 2021-07-15 DIAGNOSIS — M25.572 ACUTE LEFT ANKLE PAIN: Primary | ICD-10-CM

## 2021-07-15 PROCEDURE — 1100F PTFALLS ASSESS-DOCD GE2>/YR: CPT | Mod: CPTII,S$GLB,, | Performed by: PODIATRIST

## 2021-07-15 PROCEDURE — 1159F MED LIST DOCD IN RCRD: CPT | Mod: S$GLB,,, | Performed by: PODIATRIST

## 2021-07-15 PROCEDURE — 1159F PR MEDICATION LIST DOCUMENTED IN MEDICAL RECORD: ICD-10-PCS | Mod: S$GLB,,, | Performed by: PODIATRIST

## 2021-07-15 PROCEDURE — 99213 OFFICE O/P EST LOW 20 MIN: CPT | Mod: S$GLB,,, | Performed by: PODIATRIST

## 2021-07-15 PROCEDURE — 73610 X-RAY EXAM OF ANKLE: CPT | Mod: 26,LT,, | Performed by: RADIOLOGY

## 2021-07-15 PROCEDURE — 99999 PR PBB SHADOW E&M-EST. PATIENT-LVL III: ICD-10-PCS | Mod: PBBFAC,,, | Performed by: PODIATRIST

## 2021-07-15 PROCEDURE — 73610 XR ANKLE COMPLETE 3 VIEW LEFT: ICD-10-PCS | Mod: 26,LT,, | Performed by: RADIOLOGY

## 2021-07-15 PROCEDURE — 99213 PR OFFICE/OUTPT VISIT, EST, LEVL III, 20-29 MIN: ICD-10-PCS | Mod: S$GLB,,, | Performed by: PODIATRIST

## 2021-07-15 PROCEDURE — 1126F PR PAIN SEVERITY QUANTIFIED, NO PAIN PRESENT: ICD-10-PCS | Mod: S$GLB,,, | Performed by: PODIATRIST

## 2021-07-15 PROCEDURE — 99999 PR PBB SHADOW E&M-EST. PATIENT-LVL III: CPT | Mod: PBBFAC,,, | Performed by: PODIATRIST

## 2021-07-15 PROCEDURE — 3288F FALL RISK ASSESSMENT DOCD: CPT | Mod: CPTII,S$GLB,, | Performed by: PODIATRIST

## 2021-07-15 PROCEDURE — 73610 X-RAY EXAM OF ANKLE: CPT | Mod: TC,LT

## 2021-07-15 PROCEDURE — 1100F PR PT FALLS ASSESS DOC 2+ FALLS/FALL W/INJURY/YR: ICD-10-PCS | Mod: CPTII,S$GLB,, | Performed by: PODIATRIST

## 2021-07-15 PROCEDURE — 1126F AMNT PAIN NOTED NONE PRSNT: CPT | Mod: S$GLB,,, | Performed by: PODIATRIST

## 2021-07-15 PROCEDURE — 3288F PR FALLS RISK ASSESSMENT DOCUMENTED: ICD-10-PCS | Mod: CPTII,S$GLB,, | Performed by: PODIATRIST

## 2021-07-19 ENCOUNTER — TELEPHONE (OUTPATIENT)
Dept: FAMILY MEDICINE | Facility: CLINIC | Age: 81
End: 2021-07-19

## 2021-07-19 ENCOUNTER — SPECIALTY PHARMACY (OUTPATIENT)
Dept: PHARMACY | Facility: CLINIC | Age: 81
End: 2021-07-19

## 2021-07-21 ENCOUNTER — TELEPHONE (OUTPATIENT)
Dept: FAMILY MEDICINE | Facility: CLINIC | Age: 81
End: 2021-07-21

## 2021-07-22 ENCOUNTER — OFFICE VISIT (OUTPATIENT)
Dept: FAMILY MEDICINE | Facility: CLINIC | Age: 81
End: 2021-07-22
Payer: MEDICARE

## 2021-07-22 VITALS
OXYGEN SATURATION: 95 % | SYSTOLIC BLOOD PRESSURE: 130 MMHG | WEIGHT: 171.06 LBS | BODY MASS INDEX: 27.49 KG/M2 | HEIGHT: 66 IN | TEMPERATURE: 97 F | HEART RATE: 72 BPM | DIASTOLIC BLOOD PRESSURE: 80 MMHG

## 2021-07-22 DIAGNOSIS — N30.01 ACUTE CYSTITIS WITH HEMATURIA: Primary | ICD-10-CM

## 2021-07-22 DIAGNOSIS — F32.1 CURRENT MODERATE EPISODE OF MAJOR DEPRESSIVE DISORDER WITHOUT PRIOR EPISODE: ICD-10-CM

## 2021-07-22 DIAGNOSIS — T14.8XXA BRUISING: ICD-10-CM

## 2021-07-22 DIAGNOSIS — R41.3 MEMORY DIFFICULTIES: ICD-10-CM

## 2021-07-22 DIAGNOSIS — G30.9 MIXED DEMENTIA: ICD-10-CM

## 2021-07-22 DIAGNOSIS — F01.50 MIXED DEMENTIA: ICD-10-CM

## 2021-07-22 DIAGNOSIS — F02.80 MIXED DEMENTIA: ICD-10-CM

## 2021-07-22 DIAGNOSIS — R46.0 POOR HYGIENE: ICD-10-CM

## 2021-07-22 LAB
BILIRUB SERPL-MCNC: NEGATIVE MG/DL
BLOOD URINE, POC: ABNORMAL
CLARITY, POC UA: CLEAR
COLOR, POC UA: ABNORMAL
GLUCOSE UR QL STRIP: NORMAL
KETONES UR QL STRIP: NEGATIVE
LEUKOCYTE ESTERASE URINE, POC: ABNORMAL
NITRITE, POC UA: NEGATIVE
PH, POC UA: 6
PROTEIN, POC: ABNORMAL
SPECIFIC GRAVITY, POC UA: 1.01
UROBILINOGEN, POC UA: NORMAL

## 2021-07-22 PROCEDURE — 3075F PR MOST RECENT SYSTOLIC BLOOD PRESS GE 130-139MM HG: ICD-10-PCS | Mod: CPTII,S$GLB,, | Performed by: NURSE PRACTITIONER

## 2021-07-22 PROCEDURE — 1126F PR PAIN SEVERITY QUANTIFIED, NO PAIN PRESENT: ICD-10-PCS | Mod: CPTII,S$GLB,, | Performed by: NURSE PRACTITIONER

## 2021-07-22 PROCEDURE — 87186 SC STD MICRODIL/AGAR DIL: CPT | Performed by: NURSE PRACTITIONER

## 2021-07-22 PROCEDURE — 3079F DIAST BP 80-89 MM HG: CPT | Mod: CPTII,S$GLB,, | Performed by: NURSE PRACTITIONER

## 2021-07-22 PROCEDURE — 81002 URINALYSIS NONAUTO W/O SCOPE: CPT | Mod: S$GLB,,, | Performed by: NURSE PRACTITIONER

## 2021-07-22 PROCEDURE — 3079F PR MOST RECENT DIASTOLIC BLOOD PRESSURE 80-89 MM HG: ICD-10-PCS | Mod: CPTII,S$GLB,, | Performed by: NURSE PRACTITIONER

## 2021-07-22 PROCEDURE — 1101F PT FALLS ASSESS-DOCD LE1/YR: CPT | Mod: CPTII,S$GLB,, | Performed by: NURSE PRACTITIONER

## 2021-07-22 PROCEDURE — 87077 CULTURE AEROBIC IDENTIFY: CPT | Mod: 59 | Performed by: NURSE PRACTITIONER

## 2021-07-22 PROCEDURE — 99214 PR OFFICE/OUTPT VISIT, EST, LEVL IV, 30-39 MIN: ICD-10-PCS | Mod: 25,S$GLB,, | Performed by: NURSE PRACTITIONER

## 2021-07-22 PROCEDURE — 99999 PR PBB SHADOW E&M-EST. PATIENT-LVL IV: CPT | Mod: PBBFAC,,, | Performed by: NURSE PRACTITIONER

## 2021-07-22 PROCEDURE — 81002 POCT URINE DIPSTICK WITHOUT MICROSCOPE: ICD-10-PCS | Mod: S$GLB,,, | Performed by: NURSE PRACTITIONER

## 2021-07-22 PROCEDURE — 1101F PR PT FALLS ASSESS DOC 0-1 FALLS W/OUT INJ PAST YR: ICD-10-PCS | Mod: CPTII,S$GLB,, | Performed by: NURSE PRACTITIONER

## 2021-07-22 PROCEDURE — 3288F FALL RISK ASSESSMENT DOCD: CPT | Mod: CPTII,S$GLB,, | Performed by: NURSE PRACTITIONER

## 2021-07-22 PROCEDURE — 3075F SYST BP GE 130 - 139MM HG: CPT | Mod: CPTII,S$GLB,, | Performed by: NURSE PRACTITIONER

## 2021-07-22 PROCEDURE — 1159F MED LIST DOCD IN RCRD: CPT | Mod: CPTII,S$GLB,, | Performed by: NURSE PRACTITIONER

## 2021-07-22 PROCEDURE — 87086 URINE CULTURE/COLONY COUNT: CPT | Performed by: NURSE PRACTITIONER

## 2021-07-22 PROCEDURE — 87088 URINE BACTERIA CULTURE: CPT | Performed by: NURSE PRACTITIONER

## 2021-07-22 PROCEDURE — 99999 PR PBB SHADOW E&M-EST. PATIENT-LVL IV: ICD-10-PCS | Mod: PBBFAC,,, | Performed by: NURSE PRACTITIONER

## 2021-07-22 PROCEDURE — 1126F AMNT PAIN NOTED NONE PRSNT: CPT | Mod: CPTII,S$GLB,, | Performed by: NURSE PRACTITIONER

## 2021-07-22 PROCEDURE — 99214 OFFICE O/P EST MOD 30 MIN: CPT | Mod: 25,S$GLB,, | Performed by: NURSE PRACTITIONER

## 2021-07-22 PROCEDURE — 3288F PR FALLS RISK ASSESSMENT DOCUMENTED: ICD-10-PCS | Mod: CPTII,S$GLB,, | Performed by: NURSE PRACTITIONER

## 2021-07-22 PROCEDURE — 1159F PR MEDICATION LIST DOCUMENTED IN MEDICAL RECORD: ICD-10-PCS | Mod: CPTII,S$GLB,, | Performed by: NURSE PRACTITIONER

## 2021-07-23 ENCOUNTER — TELEPHONE (OUTPATIENT)
Dept: FAMILY MEDICINE | Facility: CLINIC | Age: 81
End: 2021-07-23

## 2021-07-23 RX ORDER — NITROFURANTOIN 25; 75 MG/1; MG/1
100 CAPSULE ORAL 2 TIMES DAILY
Qty: 14 CAPSULE | Refills: 0 | Status: SHIPPED | OUTPATIENT
Start: 2021-07-23 | End: 2021-09-22

## 2021-07-27 LAB
BACTERIA UR CULT: ABNORMAL
BACTERIA UR CULT: ABNORMAL

## 2021-07-29 ENCOUNTER — PATIENT OUTREACH (OUTPATIENT)
Dept: ADMINISTRATIVE | Facility: OTHER | Age: 81
End: 2021-07-29

## 2021-07-29 DIAGNOSIS — N30.00 ACUTE CYSTITIS WITHOUT HEMATURIA: Primary | ICD-10-CM

## 2021-07-29 RX ORDER — DOXYCYCLINE HYCLATE 100 MG
100 TABLET ORAL 2 TIMES DAILY
Qty: 14 TABLET | Refills: 0 | Status: SHIPPED | OUTPATIENT
Start: 2021-07-29 | End: 2021-09-22

## 2021-08-04 DIAGNOSIS — N30.00 ACUTE CYSTITIS WITHOUT HEMATURIA: ICD-10-CM

## 2021-08-04 RX ORDER — DOXYCYCLINE HYCLATE 100 MG
100 TABLET ORAL 2 TIMES DAILY
Qty: 14 TABLET | Refills: 0 | OUTPATIENT
Start: 2021-08-04

## 2021-08-11 ENCOUNTER — TELEPHONE (OUTPATIENT)
Dept: FAMILY MEDICINE | Facility: CLINIC | Age: 81
End: 2021-08-11

## 2021-08-11 DIAGNOSIS — R31.9 HEMATURIA, UNSPECIFIED TYPE: Primary | ICD-10-CM

## 2021-08-12 ENCOUNTER — LAB VISIT (OUTPATIENT)
Dept: LAB | Facility: HOSPITAL | Age: 81
End: 2021-08-12
Attending: FAMILY MEDICINE
Payer: MEDICARE

## 2021-08-12 DIAGNOSIS — R31.9 HEMATURIA, UNSPECIFIED TYPE: ICD-10-CM

## 2021-08-12 LAB
MICROSCOPIC COMMENT: ABNORMAL
RBC #/AREA URNS AUTO: 2 /HPF (ref 0–4)
SQUAMOUS #/AREA URNS AUTO: 10 /HPF
WBC #/AREA URNS AUTO: 8 /HPF (ref 0–5)

## 2021-08-12 PROCEDURE — 87086 URINE CULTURE/COLONY COUNT: CPT | Performed by: FAMILY MEDICINE

## 2021-08-12 PROCEDURE — 81001 URINALYSIS AUTO W/SCOPE: CPT | Performed by: FAMILY MEDICINE

## 2021-08-13 ENCOUNTER — TELEPHONE (OUTPATIENT)
Dept: RHEUMATOLOGY | Facility: CLINIC | Age: 81
End: 2021-08-13

## 2021-08-13 ENCOUNTER — TELEPHONE (OUTPATIENT)
Dept: FAMILY MEDICINE | Facility: CLINIC | Age: 81
End: 2021-08-13

## 2021-08-14 LAB — BACTERIA UR CULT: NO GROWTH

## 2021-08-17 ENCOUNTER — TELEPHONE (OUTPATIENT)
Dept: FAMILY MEDICINE | Facility: CLINIC | Age: 81
End: 2021-08-17

## 2021-09-10 ENCOUNTER — OFFICE VISIT (OUTPATIENT)
Dept: FAMILY MEDICINE | Facility: CLINIC | Age: 81
End: 2021-09-10
Payer: MEDICARE

## 2021-09-10 ENCOUNTER — TELEPHONE (OUTPATIENT)
Dept: INTERNAL MEDICINE | Facility: CLINIC | Age: 81
End: 2021-09-10

## 2021-09-10 DIAGNOSIS — F02.80 MIXED DEMENTIA: ICD-10-CM

## 2021-09-10 DIAGNOSIS — I10 ESSENTIAL HYPERTENSION: ICD-10-CM

## 2021-09-10 DIAGNOSIS — L08.9 INFECTED WOUND: Primary | ICD-10-CM

## 2021-09-10 DIAGNOSIS — T14.8XXA INFECTED WOUND: Primary | ICD-10-CM

## 2021-09-10 DIAGNOSIS — G30.9 MIXED DEMENTIA: ICD-10-CM

## 2021-09-10 DIAGNOSIS — R82.90 ABNORMAL URINE ODOR: ICD-10-CM

## 2021-09-10 DIAGNOSIS — F01.50 MIXED DEMENTIA: ICD-10-CM

## 2021-09-10 PROCEDURE — 3078F DIAST BP <80 MM HG: CPT | Mod: CPTII,S$GLB,, | Performed by: FAMILY MEDICINE

## 2021-09-10 PROCEDURE — 99214 OFFICE O/P EST MOD 30 MIN: CPT | Mod: S$GLB,,, | Performed by: FAMILY MEDICINE

## 2021-09-10 PROCEDURE — 3075F PR MOST RECENT SYSTOLIC BLOOD PRESS GE 130-139MM HG: ICD-10-PCS | Mod: CPTII,S$GLB,, | Performed by: FAMILY MEDICINE

## 2021-09-10 PROCEDURE — 99999 PR PBB SHADOW E&M-EST. PATIENT-LVL II: ICD-10-PCS | Mod: PBBFAC,,, | Performed by: FAMILY MEDICINE

## 2021-09-10 PROCEDURE — 99999 PR PBB SHADOW E&M-EST. PATIENT-LVL II: CPT | Mod: PBBFAC,,, | Performed by: FAMILY MEDICINE

## 2021-09-10 PROCEDURE — 3078F PR MOST RECENT DIASTOLIC BLOOD PRESSURE < 80 MM HG: ICD-10-PCS | Mod: CPTII,S$GLB,, | Performed by: FAMILY MEDICINE

## 2021-09-10 PROCEDURE — 99214 PR OFFICE/OUTPT VISIT, EST, LEVL IV, 30-39 MIN: ICD-10-PCS | Mod: S$GLB,,, | Performed by: FAMILY MEDICINE

## 2021-09-10 PROCEDURE — 3075F SYST BP GE 130 - 139MM HG: CPT | Mod: CPTII,S$GLB,, | Performed by: FAMILY MEDICINE

## 2021-09-13 ENCOUNTER — TELEPHONE (OUTPATIENT)
Dept: FAMILY MEDICINE | Facility: CLINIC | Age: 81
End: 2021-09-13

## 2021-09-13 VITALS
SYSTOLIC BLOOD PRESSURE: 130 MMHG | BODY MASS INDEX: 27.13 KG/M2 | HEART RATE: 88 BPM | WEIGHT: 168.13 LBS | DIASTOLIC BLOOD PRESSURE: 78 MMHG | TEMPERATURE: 98 F

## 2021-09-21 ENCOUNTER — TELEPHONE (OUTPATIENT)
Dept: FAMILY MEDICINE | Facility: CLINIC | Age: 81
End: 2021-09-21

## 2021-09-22 ENCOUNTER — OFFICE VISIT (OUTPATIENT)
Dept: FAMILY MEDICINE | Facility: CLINIC | Age: 81
End: 2021-09-22
Attending: FAMILY MEDICINE
Payer: MEDICARE

## 2021-09-22 VITALS
OXYGEN SATURATION: 93 % | TEMPERATURE: 97 F | RESPIRATION RATE: 18 BRPM | HEIGHT: 66 IN | DIASTOLIC BLOOD PRESSURE: 86 MMHG | SYSTOLIC BLOOD PRESSURE: 154 MMHG | WEIGHT: 167 LBS | BODY MASS INDEX: 26.84 KG/M2 | HEART RATE: 74 BPM

## 2021-09-22 DIAGNOSIS — R82.90 FOUL SMELLING URINE: ICD-10-CM

## 2021-09-22 DIAGNOSIS — F03.A0 MILD DEMENTIA: ICD-10-CM

## 2021-09-22 DIAGNOSIS — F32.2 CURRENT SEVERE EPISODE OF MAJOR DEPRESSIVE DISORDER WITHOUT PSYCHOTIC FEATURES WITHOUT PRIOR EPISODE: ICD-10-CM

## 2021-09-22 DIAGNOSIS — F22 DELUSION OF FOUL ODOR: Primary | ICD-10-CM

## 2021-09-22 DIAGNOSIS — I10 HYPERTENSION, UNSPECIFIED TYPE: ICD-10-CM

## 2021-09-22 PROBLEM — R60.0 EDEMA OF LEFT FOOT: Status: RESOLVED | Noted: 2021-04-07 | Resolved: 2021-09-22
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
## 2021-09-22 PROBLEM — R93.89 ABNORMAL MRI: Status: RESOLVED | Noted: 2019-09-12 | Resolved: 2021-09-22

## 2021-09-22 PROBLEM — M79.672 LEFT FOOT PAIN: Status: RESOLVED | Noted: 2021-04-07 | Resolved: 2021-09-22

## 2021-09-22 PROBLEM — R42 DIZZINESS: Status: RESOLVED | Noted: 2021-01-07 | Resolved: 2021-09-22

## 2021-09-22 LAB
MICROSCOPIC COMMENT: NORMAL
SQUAMOUS #/AREA URNS AUTO: 1 /HPF
WBC #/AREA URNS AUTO: 0 /HPF (ref 0–5)

## 2021-09-22 PROCEDURE — 3288F PR FALLS RISK ASSESSMENT DOCUMENTED: ICD-10-PCS | Mod: HCNC,CPTII,S$GLB, | Performed by: FAMILY MEDICINE

## 2021-09-22 PROCEDURE — 1159F MED LIST DOCD IN RCRD: CPT | Mod: HCNC,CPTII,S$GLB, | Performed by: FAMILY MEDICINE

## 2021-09-22 PROCEDURE — 3079F DIAST BP 80-89 MM HG: CPT | Mod: HCNC,CPTII,S$GLB, | Performed by: FAMILY MEDICINE

## 2021-09-22 PROCEDURE — 3077F PR MOST RECENT SYSTOLIC BLOOD PRESSURE >= 140 MM HG: ICD-10-PCS | Mod: HCNC,CPTII,S$GLB, | Performed by: FAMILY MEDICINE

## 2021-09-22 PROCEDURE — 1100F PR PT FALLS ASSESS DOC 2+ FALLS/FALL W/INJURY/YR: ICD-10-PCS | Mod: HCNC,CPTII,S$GLB, | Performed by: FAMILY MEDICINE

## 2021-09-22 PROCEDURE — 1126F AMNT PAIN NOTED NONE PRSNT: CPT | Mod: HCNC,CPTII,S$GLB, | Performed by: FAMILY MEDICINE

## 2021-09-22 PROCEDURE — 1126F PR PAIN SEVERITY QUANTIFIED, NO PAIN PRESENT: ICD-10-PCS | Mod: HCNC,CPTII,S$GLB, | Performed by: FAMILY MEDICINE

## 2021-09-22 PROCEDURE — 3077F SYST BP >= 140 MM HG: CPT | Mod: HCNC,CPTII,S$GLB, | Performed by: FAMILY MEDICINE

## 2021-09-22 PROCEDURE — 99999 PR PBB SHADOW E&M-EST. PATIENT-LVL V: ICD-10-PCS | Mod: PBBFAC,HCNC,, | Performed by: FAMILY MEDICINE

## 2021-09-22 PROCEDURE — 1159F PR MEDICATION LIST DOCUMENTED IN MEDICAL RECORD: ICD-10-PCS | Mod: HCNC,CPTII,S$GLB, | Performed by: FAMILY MEDICINE

## 2021-09-22 PROCEDURE — 99999 PR PBB SHADOW E&M-EST. PATIENT-LVL V: CPT | Mod: PBBFAC,HCNC,, | Performed by: FAMILY MEDICINE

## 2021-09-22 PROCEDURE — 1160F RVW MEDS BY RX/DR IN RCRD: CPT | Mod: HCNC,CPTII,S$GLB, | Performed by: FAMILY MEDICINE

## 2021-09-22 PROCEDURE — 99215 OFFICE O/P EST HI 40 MIN: CPT | Mod: HCNC,S$GLB,, | Performed by: FAMILY MEDICINE

## 2021-09-22 PROCEDURE — 87086 URINE CULTURE/COLONY COUNT: CPT | Mod: HCNC | Performed by: FAMILY MEDICINE

## 2021-09-22 PROCEDURE — 3079F PR MOST RECENT DIASTOLIC BLOOD PRESSURE 80-89 MM HG: ICD-10-PCS | Mod: HCNC,CPTII,S$GLB, | Performed by: FAMILY MEDICINE

## 2021-09-22 PROCEDURE — 99215 PR OFFICE/OUTPT VISIT, EST, LEVL V, 40-54 MIN: ICD-10-PCS | Mod: HCNC,S$GLB,, | Performed by: FAMILY MEDICINE

## 2021-09-22 PROCEDURE — 3288F FALL RISK ASSESSMENT DOCD: CPT | Mod: HCNC,CPTII,S$GLB, | Performed by: FAMILY MEDICINE

## 2021-09-22 PROCEDURE — 1100F PTFALLS ASSESS-DOCD GE2>/YR: CPT | Mod: HCNC,CPTII,S$GLB, | Performed by: FAMILY MEDICINE

## 2021-09-22 PROCEDURE — 81001 URINALYSIS AUTO W/SCOPE: CPT | Mod: HCNC | Performed by: FAMILY MEDICINE

## 2021-09-22 PROCEDURE — 1160F PR REVIEW ALL MEDS BY PRESCRIBER/CLIN PHARMACIST DOCUMENTED: ICD-10-PCS | Mod: HCNC,CPTII,S$GLB, | Performed by: FAMILY MEDICINE

## 2021-09-22 RX ORDER — AMLODIPINE BESYLATE 2.5 MG/1
5 TABLET ORAL DAILY
Qty: 90 TABLET | Refills: 2
Start: 2021-09-22

## 2021-09-23 LAB
BACTERIA UR CULT: NORMAL
BACTERIA UR CULT: NORMAL

## 2021-09-30 ENCOUNTER — CARE AT HOME (OUTPATIENT)
Dept: HOME HEALTH SERVICES | Facility: CLINIC | Age: 81
End: 2021-09-30
Attending: FAMILY MEDICINE
Payer: MEDICARE

## 2021-09-30 VITALS
SYSTOLIC BLOOD PRESSURE: 124 MMHG | HEART RATE: 78 BPM | TEMPERATURE: 97 F | DIASTOLIC BLOOD PRESSURE: 64 MMHG | RESPIRATION RATE: 16 BRPM | OXYGEN SATURATION: 98 %

## 2021-09-30 DIAGNOSIS — Z71.89 ADVANCED DIRECTIVES, COUNSELING/DISCUSSION: ICD-10-CM

## 2021-09-30 DIAGNOSIS — Z09 FOLLOW UP: ICD-10-CM

## 2021-09-30 DIAGNOSIS — F03.A0 MILD DEMENTIA: ICD-10-CM

## 2021-09-30 DIAGNOSIS — G81.94 LEFT HEMIPARESIS: Primary | ICD-10-CM

## 2021-09-30 DIAGNOSIS — F03.91 DEMENTIA WITH BEHAVIORAL DISTURBANCE, UNSPECIFIED DEMENTIA TYPE: ICD-10-CM

## 2021-09-30 DIAGNOSIS — F32.2 CURRENT SEVERE EPISODE OF MAJOR DEPRESSIVE DISORDER WITHOUT PSYCHOTIC FEATURES WITHOUT PRIOR EPISODE: ICD-10-CM

## 2021-09-30 PROCEDURE — 99497 ADVNCD CARE PLAN 30 MIN: CPT | Mod: S$GLB,,, | Performed by: NURSE PRACTITIONER

## 2021-09-30 PROCEDURE — 99499 RISK ADDL DX/OHS AUDIT: ICD-10-PCS | Mod: S$GLB,,, | Performed by: NURSE PRACTITIONER

## 2021-09-30 PROCEDURE — 99497 PR ADVNCD CARE PLAN 30 MIN: ICD-10-PCS | Mod: S$GLB,,, | Performed by: NURSE PRACTITIONER

## 2021-09-30 PROCEDURE — 99350 PR HOME VISIT,ESTAB PATIENT,LEVEL IV: ICD-10-PCS | Mod: ,,, | Performed by: NURSE PRACTITIONER

## 2021-09-30 PROCEDURE — 99350 HOME/RES VST EST HIGH MDM 60: CPT | Mod: ,,, | Performed by: NURSE PRACTITIONER

## 2021-09-30 PROCEDURE — 99499 UNLISTED E&M SERVICE: CPT | Mod: S$GLB,,, | Performed by: NURSE PRACTITIONER

## 2021-09-30 RX ORDER — SERTRALINE HYDROCHLORIDE 100 MG/1
TABLET, FILM COATED ORAL
Qty: 30 TABLET | Refills: 1 | Status: SHIPPED | OUTPATIENT
Start: 2021-09-30 | End: 2022-02-08

## 2021-11-04 ENCOUNTER — SPECIALTY PHARMACY (OUTPATIENT)
Dept: PHARMACY | Facility: CLINIC | Age: 81
End: 2021-11-04
Payer: MEDICARE

## 2021-11-12 DIAGNOSIS — I10 HYPERTENSION, UNSPECIFIED TYPE: Primary | ICD-10-CM

## 2021-11-26 ENCOUNTER — TELEPHONE (OUTPATIENT)
Dept: FAMILY MEDICINE | Facility: CLINIC | Age: 81
End: 2021-11-26
Payer: MEDICARE

## 2021-11-29 DIAGNOSIS — Z91.81 AT RISK FOR FALLS: ICD-10-CM

## 2021-11-29 RX ORDER — FLUDROCORTISONE ACETATE 0.1 MG/1
100 TABLET ORAL DAILY
Qty: 90 TABLET | Refills: 3 | Status: SHIPPED | OUTPATIENT
Start: 2021-11-29

## 2021-12-14 ENCOUNTER — SPECIALTY PHARMACY (OUTPATIENT)
Dept: PHARMACY | Facility: CLINIC | Age: 81
End: 2021-12-14
Payer: MEDICARE

## 2022-01-01 NOTE — TELEPHONE ENCOUNTER
ENDOTRACHEAL INTUBATION PROCEDURE NOTE    Date: 2022    Patient Name: Jesus Arreguin    Procedure: Endotracheal Intubation    Indication(s): Respiratory Distress  Airway Compromise    Premedication:      [x] Premedication deferred due to clinical condition and no IV access. [] Fentanyl 0 mcg/kg IV for sedation and analgesia. [] Atropine 0.02 mg/kg IV for vagal tone reduction. [] Rocuronium 0.6 mg/kg IV for muscle relaxation. Morphine 0.1 mg/kg prn given x 1 via OGT. Procedure Details:      Infant placed in the sniffing position and the oropharynx was suctioned. A size 00 Horn laryngoscope blade was inserted into the oropharynx. Cords easily visualized. A styleted 2.5 mm uncuffed endotracheal tube advanced through the glottic opening. The stylette was removed and positive pressure ventilation initiated. Symmetrical chest rise observed and breath sounds equal bilateral upon ascultation. Secondary confirmation via colorimetric CO2 detector color change. Endotracheal tube secured depth of 7 cm, measured at the gumline. Insertion depth and positioning confirmed via chest XR. Infant tolerated the procedure well and there were no complications. Vital signs remained stable throughout the procedure.      Maulik Inman NP 2022  1930 Sussy answered - wasn't sure what was going on nor what medications were in question - she is going to her mom's now and will call us back with more information - offered virtual visit if needed - denied at this time -will call us back

## 2022-01-04 ENCOUNTER — PATIENT OUTREACH (OUTPATIENT)
Dept: ADMINISTRATIVE | Facility: OTHER | Age: 82
End: 2022-01-04
Payer: MEDICARE

## 2022-01-05 ENCOUNTER — TELEPHONE (OUTPATIENT)
Dept: RHEUMATOLOGY | Facility: CLINIC | Age: 82
End: 2022-01-05
Payer: MEDICARE

## 2022-01-05 NOTE — PROGRESS NOTES
Health Maintenance Due   Topic Date Due    Shingles Vaccine (1 of 2) Never done    Influenza Vaccine (1) 09/01/2021    COVID-19 Vaccine (3 - Booster for Moderna series) 09/25/2021     Updates were requested from care everywhere.  Chart was reviewed for overdue [facility]active [facility] Encounters (TRUDI) topics (CRS, Breast Cancer Screening, Eye exam)  Health Maintenance has been updated.  LINKS immunization registry triggered.  Immunizations were reconciled.

## 2022-01-14 ENCOUNTER — TELEPHONE (OUTPATIENT)
Dept: FAMILY MEDICINE | Facility: CLINIC | Age: 82
End: 2022-01-14
Payer: MEDICARE

## 2022-01-14 ENCOUNTER — TELEPHONE (OUTPATIENT)
Dept: RHEUMATOLOGY | Facility: CLINIC | Age: 82
End: 2022-01-14
Payer: MEDICARE

## 2022-01-14 NOTE — TELEPHONE ENCOUNTER
Spoke to pts daughter and advised that we do not do cortisone shots here. I advised she call rheumatology to reschedule appt that was cancelled. She vu.

## 2022-01-14 NOTE — TELEPHONE ENCOUNTER
----- Message from Ramona Treviño sent at 1/14/2022 12:54 PM CST -----  Contact: daughter/Sussy [phone]  Daughter states pt is having pt in her legs and cannot hardly walk. She is requesting she come in today to have an injection but there are no available openings with anyone. Daughter states she cannot go through the weekend like this. Please advise.

## 2022-01-14 NOTE — TELEPHONE ENCOUNTER
"Spoke to patient's daughter who explained that her mother has extreme pain in both legs and wanted her mother to be seen today to get a "shot".  The daughter explained that her mother usually has pain in her shoulder but "today the pain had moved down into her legs". She was advised to take her mother to urgent care or the ED to be evaluated.  The daughter stated that they won't give her the "shot" she needs.  When asked what "shot" her mother receives in our clinic she stated, "she gets a cortisone shot in her bone".  Patient's daughter was informed that we only have two providers in clinic today and both schedules were filled for the day.    The daughter then requested an appt for her mother for next week. Patient was scheduled at the [place] location on 1/18/2022 at 9:30am.  "

## 2022-01-14 NOTE — TELEPHONE ENCOUNTER
----- Message from Sabine Young sent at 1/14/2022 11:31 AM CST -----  Regarding: cortisone  Contact: daughter- Sussy Hi wasn't to know if Dr Power will do a cortisone shot in patients leg, please call her back at [phone]

## 2022-01-18 NOTE — TELEPHONE ENCOUNTER
Patient missed appointment scheduled for today with Dr. Lyles.    Also sent patient portal message with Dr. Lyles' recommendations.

## 2022-01-19 ENCOUNTER — TELEPHONE (OUTPATIENT)
Dept: RHEUMATOLOGY | Facility: CLINIC | Age: 82
End: 2022-01-19
Payer: MEDICARE

## 2022-01-19 NOTE — TELEPHONE ENCOUNTER
----- Message from Maryan Murphy sent at 1/19/2022  4:01 PM CST -----  Contact: Sussy/Daughter  .Type:  Sooner Apoointment Request    Caller is requesting a sooner appointment.  Caller declined first available appointment listed below.  Caller will not accept being placed on the waitlist and is requesting a message be sent to doctor.  Name of Caller:  Sussy  When is the first available appointment?  Unknown   Symptoms: leg pain, falls and needing assistance walking due to arthrorisis   Would the patient rather a call back or a response via [facility]? call  Best Call Back Number: [phone]  Additional Information: daughter requests patient seen sooner than next available. Patient missed last appointment due to being Covid positive.  Thanks,  RP

## 2022-01-19 NOTE — TELEPHONE ENCOUNTER
"Spoke to "Sussy" patient's daughter and she stated that her mother is having pain in her legs and would like her to receive injections.  Scheduled her with Vero on 2/8/2022 at 4:00pm at the [place] location.  Patient stated that she will contact orthopedics and see if they were able to offer a sooner appt.    Patient missed appts on 1/6/2022 and 1/18/2022 with Dr. Lyles  "

## 2022-01-20 ENCOUNTER — TELEPHONE (OUTPATIENT)
Dept: FAMILY MEDICINE | Facility: CLINIC | Age: 82
End: 2022-01-20
Payer: MEDICARE

## 2022-01-20 DIAGNOSIS — M79.606 PAIN OF LOWER EXTREMITY, UNSPECIFIED LATERALITY: Primary | ICD-10-CM

## 2022-01-20 NOTE — TELEPHONE ENCOUNTER
----- Message from Alexsandra Louise sent at 1/20/2022 10:07 AM CST -----  Contact: Daughter Sussy [phone]  Patient would like to get medical advice.  Symptoms (please be specific):  Pain from behind the knee down to the feet, falling  How long have you had these symptoms: 7 days  Pharmacy name and phone # [facility] - [place], [place] - [address]  Phone:  [phone] Fax:  [phone]    Comments:  She wants to know if a steroid shot would help       Progress Note - Sylvester Ser 66 y o  male MRN: [id number]    Unit/Bed#: -01 Encounter: [id number]            Subjective:   Pt w/o complaint currently     Objective:     ROS  Gen: denies recent wt loss   Psych: denies mood change    Vitals:   T: 98 4 (not 97 4)  BP: 125/56  HR: 73  RR: 20  POx: 94%    Physical Exam:     Gen:        NAD   Neck:   trachea midline  Lungs:  respirations unlabored   Heart:    + S1 and S2   Abdomen:  Surgical drain removed   Psych: mood/affect appropriate  Neurologic: awake, alert, appropriately answering questions     Functional :  Mobility: sup  Tx: min  ADLs: mod         Current Facility-Administered Medications:  acetaminophen 975 mg Oral Q8H Urmila Sandoval MD   aspirin 81 mg Oral Daily Jacinto Anne MD   atorvastatin 40 mg Oral Daily With Nola Howard MD   bisacodyl 10 mg Rectal Daily PRN Jacinto Anne MD   enoxaparin 40 mg Subcutaneous Daily Jacinto Anne MD   hydrALAZINE 25 mg Oral Q8H PRN ROOSEVELT Sifuentes   lactulose 20 g Oral Daily PRN ROOSEVELT Sifuentes   metoprolol succinate 50 mg Oral Daily Jacinto Anne MD   mineral oil 1 enema Rectal Once PRN Jacinto Anne MD   ondansetron 4 mg Oral Q12H ROOSEVELT Sifuentes   oxyCODONE 10 mg Oral Q4H PRN Jacinto Anne MD   oxyCODONE 5 mg Oral Q4H PRN Jacinto Anne MD   pantoprazole 40 mg Oral BID AC ROOSEVELT Garcia       bisacodyl    hydrALAZINE    lactulose    mineral oil    oxyCODONE    oxyCODONE      Assessment:  Pt is 67 yo admitted for elective incisional hernia repair and MARK by Dr Cassie Stephens on 2/23/18    Plan:    Rehabilitation: cont PT/OT for ambulatory/ADL dysfxn    s/p elective incisional hernia repair and MARK :  Dr Cassie Stephens on 2/23/18, surgical drain removed, per surgery service note of 3/5/18 no lifting greater than 20 lbs or strenuous core exercises for 4 weeks         HTN: on home toprol XL 50 mg qd, lisinopril 20 mg qd, however home HCTZ reduced from 25 mg to 12 5 QD    HL: on home atorvastatin 40 mg qpm      AAA: s/p repair, by Dr Blaine Costello      PVD: s/p bypass graft      CAD: s/p PCI, stress test of 1/8/2015 w/o perfusion abnormality; on home ASA 81 mg qd, atorvastatin 40 mg qpm, toprol XL 50 mg qd (additionally holter monitor of 2/15/17 revealed PAC/PVCs but no VT/SVT/A-fib/A-flutter and was without bradyarrhythmia or advanced heart block); OP FU with Dr Bhavna Powers     GERD/hiatal hernia: home zantac 150 mg qd non-formulary, therapeutic substitution          Anemia: ABLA, Hg currently stable at 10 6     Hypokalemia: s/p repletion          Incidental findings of CT AP of 9/19/14: cholelithiasis, diverticulosis      DVT ppx: scd's, lovenox

## 2022-01-20 NOTE — TELEPHONE ENCOUNTER
----- Message from Lexii Rhodes sent at 1/20/2022  9:31 AM CST -----  Contact: Pt Daughter  .Type:  Needs Medical Advice    Who Called: Pt Daughter   Symptoms (please be specific):   How long has patient had these symptoms:   Pharmacy name and phone #:   Would the patient rather a call back or a response via [facility]? Call   Best Call Back Number: .[phone] (home) [phone] (work)   Additional Information:Pt daughter is calling questioning if she can have a referral sent for her to see an orthopedic provider to receive her injection.  She is advising the pt can not walk due to the pain she is experiencing  in her

## 2022-01-21 ENCOUNTER — TELEPHONE (OUTPATIENT)
Dept: FAMILY MEDICINE | Facility: CLINIC | Age: 82
End: 2022-01-21
Payer: MEDICARE

## 2022-01-21 NOTE — TELEPHONE ENCOUNTER
----- Message from Adriana Taylor sent at 1/21/2022 12:32 PM CST -----  Contact: Sussy(daughter)  Sussy called to consult with nurse or staff regarding a steroid shot. She states the patient is unable to walk and is falling and would like to see about getting a referral to pain management or orthopedics. She states she's tried calling the office but hasn't heard back from anyone. Sussy would like a call back and can be reached at [phone]. Thanks/MR

## 2022-01-21 NOTE — TELEPHONE ENCOUNTER
----- Message from Flora Gresham sent at 1/21/2022  3:44 PM CST -----  .Type:  Needs Medical Advice    Who Called: pt   Symptoms (please be specific):    How long has patient had these symptoms:    Pharmacy name and phone #:    Would the patient rather a call back or a response via [facility]? call  Best Call Back Number: .[phone]    Additional Information: pt needs a call back in regards to getting a cortizone shot please call pt

## 2022-01-21 NOTE — TELEPHONE ENCOUNTER
Referral is in to see ortho. I called pts daughter to schedule and she would like sooner than march. Please call and assist in scheduling pt. Thank you.

## 2022-01-28 ENCOUNTER — PATIENT MESSAGE (OUTPATIENT)
Dept: SPORTS MEDICINE | Facility: CLINIC | Age: 82
End: 2022-01-28
Payer: MEDICARE

## 2022-01-28 ENCOUNTER — TELEPHONE (OUTPATIENT)
Dept: SPORTS MEDICINE | Facility: CLINIC | Age: 82
End: 2022-01-28
Payer: MEDICARE

## 2022-01-28 NOTE — TELEPHONE ENCOUNTER
Unable to leave vm regarding to orthopedic referral and scheduling apt     [facility]

## 2022-02-03 DIAGNOSIS — I10 HYPERTENSION: ICD-10-CM

## 2022-02-07 ENCOUNTER — PATIENT OUTREACH (OUTPATIENT)
Dept: ADMINISTRATIVE | Facility: OTHER | Age: 82
End: 2022-02-07
Payer: MEDICARE

## 2022-02-08 ENCOUNTER — TELEPHONE (OUTPATIENT)
Dept: RHEUMATOLOGY | Facility: CLINIC | Age: 82
End: 2022-02-08

## 2022-02-08 ENCOUNTER — HOSPITAL ENCOUNTER (OUTPATIENT)
Dept: RADIOLOGY | Facility: HOSPITAL | Age: 82
Discharge: HOME | End: 2022-02-08
Attending: PHYSICIAN ASSISTANT
Payer: MEDICARE

## 2022-02-08 ENCOUNTER — OFFICE VISIT (OUTPATIENT)
Dept: RHEUMATOLOGY | Facility: CLINIC | Age: 82
End: 2022-02-08
Payer: MEDICARE

## 2022-02-08 VITALS
HEART RATE: 72 BPM | DIASTOLIC BLOOD PRESSURE: 68 MMHG | BODY MASS INDEX: 26.95 KG/M2 | HEIGHT: 66 IN | SYSTOLIC BLOOD PRESSURE: 112 MMHG

## 2022-02-08 DIAGNOSIS — Z91.81 AT RISK FOR FALLS: ICD-10-CM

## 2022-02-08 DIAGNOSIS — M15.9 PRIMARY OSTEOARTHRITIS INVOLVING MULTIPLE JOINTS: ICD-10-CM

## 2022-02-08 DIAGNOSIS — M85.80 OSTEOPENIA WITH HIGH RISK OF FRACTURE: Primary | ICD-10-CM

## 2022-02-08 DIAGNOSIS — M25.562 BILATERAL CHRONIC KNEE PAIN: ICD-10-CM

## 2022-02-08 DIAGNOSIS — M25.561 BILATERAL CHRONIC KNEE PAIN: ICD-10-CM

## 2022-02-08 DIAGNOSIS — G89.29 BILATERAL CHRONIC KNEE PAIN: ICD-10-CM

## 2022-02-08 PROCEDURE — 1101F PT FALLS ASSESS-DOCD LE1/YR: CPT | Mod: HCNC,CPTII,S$GLB, | Performed by: PHYSICIAN ASSISTANT

## 2022-02-08 PROCEDURE — 96372 PR INJECTION,THERAP/PROPH/DIAG2ST, IM OR SUBCUT: ICD-10-PCS | Mod: HCNC,S$GLB,, | Performed by: PHYSICIAN ASSISTANT

## 2022-02-08 PROCEDURE — 3288F FALL RISK ASSESSMENT DOCD: CPT | Mod: HCNC,CPTII,S$GLB, | Performed by: PHYSICIAN ASSISTANT

## 2022-02-08 PROCEDURE — 99999 PR PBB SHADOW E&M-EST. PATIENT-LVL III: ICD-10-PCS | Mod: PBBFAC,HCNC,, | Performed by: PHYSICIAN ASSISTANT

## 2022-02-08 PROCEDURE — 1126F PR PAIN SEVERITY QUANTIFIED, NO PAIN PRESENT: ICD-10-PCS | Mod: HCNC,CPTII,S$GLB, | Performed by: PHYSICIAN ASSISTANT

## 2022-02-08 PROCEDURE — 1159F PR MEDICATION LIST DOCUMENTED IN MEDICAL RECORD: ICD-10-PCS | Mod: HCNC,CPTII,S$GLB, | Performed by: PHYSICIAN ASSISTANT

## 2022-02-08 PROCEDURE — 3288F PR FALLS RISK ASSESSMENT DOCUMENTED: ICD-10-PCS | Mod: HCNC,CPTII,S$GLB, | Performed by: PHYSICIAN ASSISTANT

## 2022-02-08 PROCEDURE — 96372 THER/PROPH/DIAG INJ SC/IM: CPT | Mod: HCNC,S$GLB,, | Performed by: PHYSICIAN ASSISTANT

## 2022-02-08 PROCEDURE — 73562 X-RAY EXAM OF KNEE 3: CPT | Mod: TC,50,HCNC

## 2022-02-08 PROCEDURE — 99215 PR OFFICE/OUTPT VISIT, EST, LEVL V, 40-54 MIN: ICD-10-PCS | Mod: 25,HCNC,S$GLB, | Performed by: PHYSICIAN ASSISTANT

## 2022-02-08 PROCEDURE — 73562 X-RAY EXAM OF KNEE 3: CPT | Mod: 26,HCNC,RT, | Performed by: RADIOLOGY

## 2022-02-08 PROCEDURE — 99215 OFFICE O/P EST HI 40 MIN: CPT | Mod: 25,HCNC,S$GLB, | Performed by: PHYSICIAN ASSISTANT

## 2022-02-08 PROCEDURE — 1101F PR PT FALLS ASSESS DOC 0-1 FALLS W/OUT INJ PAST YR: ICD-10-PCS | Mod: HCNC,CPTII,S$GLB, | Performed by: PHYSICIAN ASSISTANT

## 2022-02-08 PROCEDURE — 20610 LARGE JOINT ASPIRATION/INJECTION: BILATERAL KNEE: ICD-10-PCS | Mod: 50,HCNC,S$GLB, | Performed by: INTERNAL MEDICINE

## 2022-02-08 PROCEDURE — 73562 PR  X-RAY KNEE 3 VIEW: ICD-10-PCS | Mod: 26,HCNC,LT, | Performed by: RADIOLOGY

## 2022-02-08 PROCEDURE — 1159F MED LIST DOCD IN RCRD: CPT | Mod: HCNC,CPTII,S$GLB, | Performed by: PHYSICIAN ASSISTANT

## 2022-02-08 PROCEDURE — 20610 DRAIN/INJ JOINT/BURSA W/O US: CPT | Mod: 50,HCNC,S$GLB, | Performed by: INTERNAL MEDICINE

## 2022-02-08 PROCEDURE — 1126F AMNT PAIN NOTED NONE PRSNT: CPT | Mod: HCNC,CPTII,S$GLB, | Performed by: PHYSICIAN ASSISTANT

## 2022-02-08 PROCEDURE — 73562 X-RAY EXAM OF KNEE 3: CPT | Mod: 26,HCNC,LT, | Performed by: RADIOLOGY

## 2022-02-08 PROCEDURE — 99999 PR PBB SHADOW E&M-EST. PATIENT-LVL III: CPT | Mod: PBBFAC,HCNC,, | Performed by: PHYSICIAN ASSISTANT

## 2022-02-08 RX ORDER — CETIRIZINE HYDROCHLORIDE 10 MG/1
10 TABLET ORAL DAILY
COMMUNITY

## 2022-02-08 RX ORDER — ALPRAZOLAM 0.5 MG/1
0.5 TABLET ORAL NIGHTLY PRN
COMMUNITY

## 2022-02-08 RX ORDER — TRIAMCINOLONE ACETONIDE 40 MG/ML
40 INJECTION, SUSPENSION INTRA-ARTICULAR; INTRAMUSCULAR
Status: DISCONTINUED | OUTPATIENT
Start: 2022-02-08 | End: 2022-02-08 | Stop reason: HOSPADM

## 2022-02-08 RX ORDER — BETAMETHASONE SODIUM PHOSPHATE AND BETAMETHASONE ACETATE 3; 3 MG/ML; MG/ML
6 INJECTION, SUSPENSION INTRA-ARTICULAR; INTRALESIONAL; INTRAMUSCULAR; SOFT TISSUE
Status: COMPLETED | OUTPATIENT
Start: 2022-02-08 | End: 2022-02-08

## 2022-02-08 RX ADMIN — TRIAMCINOLONE ACETONIDE 40 MG: 40 INJECTION, SUSPENSION INTRA-ARTICULAR; INTRAMUSCULAR at 04:02

## 2022-02-08 RX ADMIN — BETAMETHASONE SODIUM PHOSPHATE AND BETAMETHASONE ACETATE 6 MG: 3; 3 INJECTION, SUSPENSION INTRA-ARTICULAR; INTRALESIONAL; INTRAMUSCULAR; SOFT TISSUE at 02:02

## 2022-02-08 NOTE — PROGRESS NOTES
"     RHEUMATOLOGY OUTPATIENT CLINIC NOTE    2/8/2022    Attending Rheumatologist: SHARATH OrtegaC  Primary Care Provider: Leslie Poe MD   Physician Requesting Consultation: Brendan Leal MD  Chief Complaint/Reason For Consultation:  Osteopenia      Subjective:       HPI  Shannon Solano is a very pleasant 81 y.o. female w/ hx CVA and dementia here w/ daughter c/o frequent falls. Daughter is req pt get a steroid injection for arthritis pain in bilateral knees. She is taking tylenol arthritis at home which dose help some. Pt is on hospice w/ admit dx of CVA and dementia. Hospice contacted during today's OV and we can cont to treat pt for her osteopenia. Had dexa w/ high frax 9/2020 and first reclast 9/2020. She has not had any more doses. She does fall frequently. Pt lives at home with her daughter and her daughter is her only caregiver. Per daughter pt does have PT coming to help with strengthening. OP PT not an option. She does have a wheelchair and other walking devices at home but does not use them regularly. She reports history of a leg fx after a fall years ago tx with a cast, doesn't remember which leg or where.  no hx diabetes- not on meds for DM2.    reclast started 9/28/20    Current meds for osteopenia/ osteoporosis: first reclast 9/28/20  Prior meds for osteopenia/ osteoporosis: none  Prev dexa scan: none  Vit D supplement: daily otc   Calcium: no supplement not much in diet   Smoker/tobacco: no  Falls: freq falls at home  Activity level: minimal exercise  Kidney problems :  no   Prev fracture : years ago, leg, can't remember which one   PPI/gerd: no  Other risk factors :  neuropathy   Dental issues:  top dentures  Anticipated dental work :  none       Vitals:    02/08/22 1300   BP: 112/68   Pulse: 72   Height: 5' 6" (1.676 m)     Review of Systems   Constitutional: Negative for chills and fever.   Eyes: Negative for pain and redness.   Respiratory: Negative for cough.  "   Gastrointestinal: Negative for blood in stool and melena.   Genitourinary: Negative for dysuria and hematuria.   Musculoskeletal: Positive for falls and joint pain.   Skin: Negative for rash.       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, Disp: 90 tablet, Rfl: 2    aspirin (ECOTRIN) 81 MG EC tablet, Take 81 mg by mouth once daily., Disp: , Rfl:     atorvastatin (LIPITOR) 40 MG tablet, TAKE 1 TABLET BY MOUTH EVERY DAY, Disp: 90 tablet, Rfl: 3    cetirizine (ZYRTEC) 10 MG tablet, Take 10 mg by mouth once daily., Disp: , Rfl:     donepeziL (ARICEPT) 10 MG tablet, Take 1 tablet (10 mg total) by mouth every evening., Disp: 90 tablet, Rfl: 3    fludrocortisone (FLORINEF) 0.1 mg Tab, Take 1 tablet (100 mcg total) by mouth once daily., Disp: 90 tablet, Rfl: 3    gabapentin (NEURONTIN) 400 MG capsule, TAKE 1 CAPSULE BY MOUTH EVERY EVENING., Disp: 90 capsule, Rfl: 0    irbesartan (AVAPRO) 300 MG tablet, Take 1 tablet (300 mg total) by mouth once daily., Disp: 90 tablet, Rfl: 3    memantine (NAMENDA) 10 MG Tab, Take 1 tablet (10 mg total) by mouth 2 (two) times daily., Disp: 180 tablet, Rfl: 11     multivitamin (THERAGRAN) per tablet, Take 1 tablet by mouth once daily., Disp: , Rfl:     thiamine 100 MG tablet, TAKE 1 TABLET BY MOUTH EVERY DAY, Disp: 90 tablet, Rfl: 1    traZODone (DESYREL) 50 MG tablet, Take 1 tablet (50 mg total) by mouth every evening., Disp: 90 tablet, Rfl: 3    ALPRAZolam (XANAX) 0.5 MG tablet, Take 0.5 mg by mouth nightly as needed for Anxiety., Disp: , Rfl:     diclofenac sodium (VOLTAREN) 1 % Gel, APPLY 2 GRAMS TO AFFECTED AREA ON SKIN DAILY (Patient not taking: Reported on 2/8/2022), Disp: 100 g, Rfl: 0    evolocumab (REPATHA SURECLICK) 140 mg/mL PnIj, Inject 1 mL (140 mg total) into the skin every 14 (fourteen) days. (Patient not taking: Reported on 2/8/2022), Disp: 2 mL, Rfl: 7    folic acid-vit B6-vit B12 2.5-25-2 mg (FOLBIC OR EQUIV) 2.5-25-2 mg Tab, Take 1 tablet by mouth once daily. (Patient not taking: Reported on 2/8/2022), Disp: 60 tablet, Rfl: 1    sertraline (ZOLOFT) 100 MG tablet, TAKE 1 TABLET BY MOUTH EVERY DAY (Patient not taking: Reported on 2/8/2022), Disp: 30 tablet, Rfl: 1     Objective:         Reviewed old and all outside pertinent medical records available.    Physical Exam  Vitals reviewed.   Constitutional:       Appearance: Normal appearance.   HENT:      Head: Normocephalic and atraumatic.   Eyes:      Extraocular Movements: Extraocular movements intact.      Pupils: Pupils are equal, round, and reactive to light.   Pulmonary:      Effort: Pulmonary effort is normal.   Musculoskeletal:         General: No swelling or signs of injury.      Right knee: Swelling present. Tenderness present.      Left knee: Swelling present. Tenderness present.   Skin:     General: Skin is warm and dry.   Neurological:      Mental Status: She is alert. Mental status is at baseline.       Immunization History   Administered Date(s) Administered    COVID-19, MRNA, LN-S, PF (MODERNA FULL 0.5 ML DOSE) 02/25/2021, 03/25/2021    Influenza - High Dose - PF (65 years and older)  11/29/2011, 10/18/2012, 10/13/2014, 10/06/2017, 10/16/2018, 10/29/2019    Influenza - Quadrivalent - High Dose - PF (65 years and older) 08/31/2020    Influenza - Trivalent (ADULT) 11/17/2006, 10/11/2007, 12/19/2008, 10/25/2010    Influenza Split 11/17/2006, 10/11/2007, 12/19/2008, 10/25/2010    Pneumococcal Conjugate - 13 Valent 04/21/2015    Pneumococcal Polysaccharide - 23 Valent 02/03/2014    Tdap 10/19/2020       All lab results personally reviewed and interpreted by me.    Lab Results   Component Value Date     12/08/2020    K 4.7 12/08/2020     12/08/2020    CO2 28 12/08/2020     (H) 12/08/2020    BUN 24 (H) 12/08/2020    CALCIUM 9.1 12/08/2020    PROT 7.1 12/08/2020    ALBUMIN 3.8 12/08/2020    BILITOT 0.6 12/08/2020    AST 32 12/08/2020    ALKPHOS 87 12/08/2020    ALT 18 12/08/2020       Imaging:  All imaging reviewed and independently interpreted by me.    DEXA BONE DENSITY SPINE HIP 9/3/20     FINDINGS:  The L1 to L4 vertebral bone mineral density is equal to 0.95 g/cm squared with a T score of -1.9.  The left femoral neck bone mineral density is equal to 0.82 g/cm squared with a T score of -1.5.  The total hip bone mineral density is equal to 0.76 g/cm squared with a T score of -1.9.     There is a 13.9% risk of a major osteoporotic fracture and a 3.7% risk of hip fracture in the next 10 years (FRAX).     Impression: Osteopenia     ASSESSMENT / PLAN:       1. At risk for falls    2. Osteopenia, high fracture risk   3. osteoarthritis     1. IM celestone today for arthritis pain and prince knee joint injections  2. Schedule dose 2 of reclast w/ early CMP. Repeat dexa 2023  3. Xray prince knees today    Large Joint Aspiration/Injection: bilateral knee    Date/Time: 2/8/2022 4:00 PM  Performed by: Jun Whatley MD  Authorized by: Jun Whatley MD     Consent Done?:  Yes (Verbal)  Indications:  Joint swelling and pain  Site marked: the procedure site was marked    Timeout:  prior to procedure the correct patient, procedure, and site was verified    Prep: patient was prepped and draped in usual sterile fashion    Local anesthesia used?: No    Anesthesia:  Local infiltration  Local anesthetic:  Lidocaine 2% without epinephrine  Anesthetic total (ml):  2      Details:  Needle Size:  25 G  Ultrasonic Guidance for needle placement?: No    Approach:  Medial  Location:  Knee  Laterality:  Bilateral  Site:  Bilateral knee  Medications (Right):  40 mg triamcinolone acetonide 40 mg/mL  Medications (Left):  40 mg triamcinolone acetonide 40 mg/mL  Patient tolerance:  Patient tolerated the procedure well with no immediate complications          Method of contact with patient concerns: MyChart attn Rheumatology    60 minutes of total time spent on the encounter, which includes face to face time and non-face to face time preparing to see the patient (eg, review of tests), Obtaining and/or reviewing separately obtained history, Documenting clinical information in the electronic or other health record, Independently interpreting results (not separately reported) and communicating results to the patient/family/caregiver, or Care coordination (not separately reported).        Vero López PA-C  Rheumatology Department   [facility] [place]

## 2022-02-08 NOTE — PROGRESS NOTES
Administered 1 cc Betamethasone 6mg/cc  to right ventrogluteal. Band-Aid applied.  Pt tolerated well. Instructed patient on S/S to report to provider. Monitored pt x 15 min. post injection for adverse reactions. None noted, denied discomfort. Pt discharged.     Lot: [id number]  Exp: 9/5/22

## 2022-02-15 ENCOUNTER — TELEPHONE (OUTPATIENT)
Dept: RHEUMATOLOGY | Facility: CLINIC | Age: 82
End: 2022-02-15
Payer: MEDICARE

## 2022-02-15 ENCOUNTER — LAB VISIT (OUTPATIENT)
Dept: LAB | Facility: HOSPITAL | Age: 82
End: 2022-02-15
Attending: NURSE PRACTITIONER
Payer: MEDICARE

## 2022-02-15 DIAGNOSIS — M85.80 OSTEOPENIA WITH HIGH RISK OF FRACTURE: ICD-10-CM

## 2022-02-15 LAB
ALBUMIN SERPL BCP-MCNC: 3.5 G/DL (ref 3.5–5.2)
ALP SERPL-CCNC: 83 U/L (ref 55–135)
ALT SERPL W/O P-5'-P-CCNC: 24 U/L (ref 10–44)
ANION GAP SERPL CALC-SCNC: 12 MMOL/L (ref 8–16)
AST SERPL-CCNC: 20 U/L (ref 10–40)
BILIRUB SERPL-MCNC: 0.5 MG/DL (ref 0.1–1)
BUN SERPL-MCNC: 27 MG/DL (ref 8–23)
CALCIUM SERPL-MCNC: 9.6 MG/DL (ref 8.7–10.5)
CHLORIDE SERPL-SCNC: 106 MMOL/L (ref 95–110)
CO2 SERPL-SCNC: 26 MMOL/L (ref 23–29)
CREAT SERPL-MCNC: 0.9 MG/DL (ref 0.5–1.4)
EST. GFR  (AFRICAN AMERICAN): >60 ML/MIN/1.73 M^2
EST. GFR  (NON AFRICAN AMERICAN): 59.3 ML/MIN/1.73 M^2
GLUCOSE SERPL-MCNC: 142 MG/DL (ref 70–110)
POTASSIUM SERPL-SCNC: 3.6 MMOL/L (ref 3.5–5.1)
PROT SERPL-MCNC: 6.8 G/DL (ref 6–8.4)
SODIUM SERPL-SCNC: 144 MMOL/L (ref 136–145)

## 2022-02-15 PROCEDURE — 80053 COMPREHEN METABOLIC PANEL: CPT | Mod: HCNC | Performed by: PHYSICIAN ASSISTANT

## 2022-02-15 PROCEDURE — 36415 COLL VENOUS BLD VENIPUNCTURE: CPT | Mod: HCNC,PO | Performed by: PHYSICIAN ASSISTANT

## 2022-02-15 RX ORDER — SODIUM CHLORIDE 0.9 % (FLUSH) 0.9 %
10 SYRINGE (ML) INJECTION
Status: CANCELLED | OUTPATIENT
Start: 2022-02-15

## 2022-02-15 RX ORDER — ACETAMINOPHEN 325 MG/1
650 TABLET ORAL ONCE
Status: CANCELLED
Start: 2022-02-15 | End: 2022-02-15

## 2022-02-15 RX ORDER — ZOLEDRONIC ACID 5 MG/100ML
5 INJECTION, SOLUTION INTRAVENOUS
Status: CANCELLED | OUTPATIENT
Start: 2022-02-15

## 2022-02-15 RX ORDER — HEPARIN 100 UNIT/ML
500 SYRINGE INTRAVENOUS
Status: CANCELLED | OUTPATIENT
Start: 2022-02-15

## 2022-02-15 NOTE — TELEPHONE ENCOUNTER
----- Message from Alejandra Moore RN sent at 2/13/2022  6:20 PM CST -----  Regarding: reclast  Please sign therapy plan, thanks

## 2022-02-16 ENCOUNTER — TELEPHONE (OUTPATIENT)
Dept: RHEUMATOLOGY | Facility: CLINIC | Age: 82
End: 2022-02-16
Payer: MEDICARE

## 2022-02-16 ENCOUNTER — INFUSION (OUTPATIENT)
Dept: INFUSION THERAPY | Facility: HOSPITAL | Age: 82
End: 2022-02-16
Attending: INTERNAL MEDICINE
Payer: MEDICARE

## 2022-02-16 VITALS
OXYGEN SATURATION: 97 % | HEART RATE: 73 BPM | TEMPERATURE: 98 F | SYSTOLIC BLOOD PRESSURE: 139 MMHG | RESPIRATION RATE: 16 BRPM | DIASTOLIC BLOOD PRESSURE: 75 MMHG

## 2022-02-16 DIAGNOSIS — Z91.81 AT RISK FOR FALLS: ICD-10-CM

## 2022-02-16 DIAGNOSIS — M85.80 OSTEOPENIA, UNSPECIFIED LOCATION: Primary | ICD-10-CM

## 2022-02-16 PROCEDURE — 63600175 PHARM REV CODE 636 W HCPCS: Mod: HCNC | Performed by: PHYSICIAN ASSISTANT

## 2022-02-16 PROCEDURE — 25000003 PHARM REV CODE 250: Mod: HCNC | Performed by: PHYSICIAN ASSISTANT

## 2022-02-16 PROCEDURE — 96365 THER/PROPH/DIAG IV INF INIT: CPT | Mod: HCNC

## 2022-02-16 RX ORDER — ACETAMINOPHEN 325 MG/1
650 TABLET ORAL ONCE
Start: 2022-09-28 | End: 2022-09-28

## 2022-02-16 RX ORDER — ZOLEDRONIC ACID 5 MG/100ML
5 INJECTION, SOLUTION INTRAVENOUS
Status: COMPLETED | OUTPATIENT
Start: 2022-02-16 | End: 2022-02-16

## 2022-02-16 RX ORDER — ACETAMINOPHEN 325 MG/1
650 TABLET ORAL ONCE
Status: COMPLETED | OUTPATIENT
Start: 2022-02-16 | End: 2022-02-16

## 2022-02-16 RX ORDER — ZOLEDRONIC ACID 5 MG/100ML
5 INJECTION, SOLUTION INTRAVENOUS
OUTPATIENT
Start: 2022-09-28

## 2022-02-16 RX ORDER — SODIUM CHLORIDE 0.9 % (FLUSH) 0.9 %
10 SYRINGE (ML) INJECTION
OUTPATIENT
Start: 2022-09-28

## 2022-02-16 RX ORDER — HEPARIN 100 UNIT/ML
500 SYRINGE INTRAVENOUS
OUTPATIENT
Start: 2022-09-28

## 2022-02-16 RX ADMIN — ACETAMINOPHEN 650 MG: 325 TABLET ORAL at 01:02

## 2022-02-16 RX ADMIN — ZOLEDRONIC ACID 5 MG: 0.05 INJECTION, SOLUTION INTRAVENOUS at 02:02

## 2022-02-16 NOTE — TELEPHONE ENCOUNTER
----- Message from Vero López PA-C sent at 2/16/2022  8:26 AM CST -----  Please call and let her know labs look good- calcium is still normal. Thank you.

## 2022-02-16 NOTE — TELEPHONE ENCOUNTER
LM for pt informing her that her labs looked normal and calcium was still good. Informed pt to continue on with treatment plan as discussed last visit.

## 2022-02-16 NOTE — DISCHARGE INSTRUCTIONS
[facility]  [address]  [address]  [phone] phone     [phone] fax  Hours of Operation: Monday- Friday 8:00am- 5:00pm  After hours phone  [phone]  Hematology / Oncology Physicians on call      Dr. Alec Padgett      Please call with any concerns regarding your appointment today.      FALL PREVENTION   Falls often occur due to slipping, tripping or losing your balance. Here are ways to reduce your risk of falling again.   Was there anything that caused your fall that can be fixed, removed or replaced?   Make your home safe by keeping walkways clear of objects you may trip over.   Use non-slip pads under rugs.   Do not walk in poorly lit areas.   Do not stand on chairs or wobbly ladders.   Use caution when reaching overhead or looking upward. This position can cause a loss of balance.   Be sure your shoes fit properly, have non-slip bottoms and are in good condition.   Be cautious when going up and down stairs, curbs, and when walking on uneven sidewalks.   If your balance is poor, consider using a cane or walker.   If your fall was related to alcohol use, stop or limit alcohol intake.   If your fall was related to use of sleeping medicines, talk to your doctor about this. You may need to reduce your dosage at bedtime if you awaken during the night to go to the bathroom.   To reduce the need for nighttime bathroom trips:   Avoid drinking fluids for several hours before going to bed   Empty your bladder before going to bed   Men can keep a urinal at the bedside   © 0200-6728 [employer], [address] [place] [address]. All rights reserved. This information is not intended as a substitute for professional medical care. Always follow your healthcare professional's instructions.  FALL PREVENTION   Falls often occur due to slipping, tripping or losing your balance. Here are ways to reduce  your risk of falling again.   Was there anything that caused your fall that can be fixed, removed or replaced?   Make your home safe by keeping walkways clear of objects you may trip over.   Use non-slip pads under rugs.   Do not walk in poorly lit areas.   Do not stand on chairs or wobbly ladders.   Use caution when reaching overhead or looking upward. This position can cause a loss of balance.   Be sure your shoes fit properly, have non-slip bottoms and are in good condition.   Be cautious when going up and down stairs, curbs, and when walking on uneven sidewalks.   If your balance is poor, consider using a cane or walker.   If your fall was related to alcohol use, stop or limit alcohol intake.   If your fall was related to use of sleeping medicines, talk to your doctor about this. You may need to reduce your dosage at bedtime if you awaken during the night to go to the bathroom.   To reduce the need for nighttime bathroom trips:   Avoid drinking fluids for several hours before going to bed   Empty your bladder before going to bed   Men can keep a urinal at the bedside   © 0850-2624 [employer], [address] [place] [address]. All rights reserved. This information is not intended as a substitute for professional medical care. Always follow your healthcare professional's instructions.      WAYS TO HELP PREVENT INFECTION         WASH YOUR HANDS OFTEN DURING THE DAY, ESPECIALLY BEFORE YOU EAT, AFTER USING THE BATHROOM, AND AFTER TOUCHING ANIMALS     STAY AWAY FROM PEOPLE WHO HAVE ILLNESSES YOU CAN CATCH; SUCH AS COLDS, FLU, CHICKEN POX     TRY TO AVOID CROWDS     STAY AWAY FROM CHILDREN WHO RECENTLY HAVE RECEIVED LIVE VIRUS VACCINES     MAINTAIN GOOD MOUTH CARE     DO NOT SQUEEZE OR SCRATCH PIMPLES     CLEAN CUTS & SCRAPES RIGHT AWAY AND DAILY UNTIL HEALED WITH WARM WATER, SOAP & AN ANTISEPTIC     AVOID CONTACT WITH LITTER BOXES, BIRD CAGES, & FISH TANKS     AVOID STANDING WATER, IE., BIRD  BATHS, FLOWER POTS/VASES, OR HUMIDIFIERS     WEAR GLOVES WHEN GARDENING OR CLEANING UP AFTER OTHERS, ESPECIALLY BABIES & SMALL CHILDREN    YOU HAVE STARTED ON CHEMOTHERAPY. IF YOU EXPERIENCE ANY OF THE FOLLOWING PROBLEMS, CALL THE OFFICE IMMEDIATELY.    *FEVER .0 OR GREATER    *CHILLS, ESPECIALLY SHAKING CHILLS, OR SWEATING    *A SEVERE COUGH OR SORE THROAT, OR SINUS PAIN/     PRESSURE    *REDNESS, SWELLING, OR TENDERNESS AROUND A WOUND,     SORE, PIMPLE, RECTAL AREA, OR IV SITE    *SORES OR ULCERS IN THE MOUTH    *BLISTERS ON THE LIPS OR SKIN    *FREQUENT URGENCY TO URINATE OR A BURNING FEELING   WHEN YOU URINATE    *BLOOD IN THE URINE OR STOOL    *ANY UNEXPLAINED BRUISING OR PROLONGED BLEEDING,     (NOSEBLEEDS OR BLEEDING GUMS)    *LOOSE BOWEL MOVEMENTS THAT DO NOT RESPOND TO     IMODIUM OR MORE THAN THREE TIMES A DAY    *VOMITING UNRESPONSIVE TO ANTINAUSEA MEDICINE    *ANY UNUSUAL PHYSICAL SYMPTOMS THAT BEGAN AFTER     CHEMOTHERAPY    DURING WEEKDAYS, CALL AND ASK TO SPEAK DIRECTLY TO A NURSE.  AT OTHER TIMES, CALL THE OFFICE PHONE NUMBER; THE ANSWERING SERVICE WILL CONTACT THE ON-CALL PHYSICIAN.  SOMEONE IS AVAILABLE 24 HOURS A DAY, SEVEN DAYS A WEEK.       DO NOT EAT RAW FISH, SEAFOOD, MEAT, OR EGGS

## 2022-02-17 NOTE — PLAN OF CARE
Pt will tolerate Reclast well today.  Will take care to avoid falls and infections.  Will call with any questions or concerns.

## 2022-02-28 RX ORDER — LANOLIN ALCOHOL/MO/W.PET/CERES
CREAM (GRAM) TOPICAL
Qty: 90 TABLET | Refills: 1 | Status: SHIPPED | OUTPATIENT
Start: 2022-02-28

## 2022-03-21 ENCOUNTER — OFFICE VISIT (OUTPATIENT)
Dept: RHEUMATOLOGY | Facility: CLINIC | Age: 82
End: 2022-03-21
Payer: MEDICARE

## 2022-03-21 ENCOUNTER — TELEPHONE (OUTPATIENT)
Dept: RHEUMATOLOGY | Facility: CLINIC | Age: 82
End: 2022-03-21
Payer: MEDICARE

## 2022-03-21 VITALS
HEIGHT: 66 IN | WEIGHT: 162.69 LBS | SYSTOLIC BLOOD PRESSURE: 147 MMHG | HEART RATE: 76 BPM | DIASTOLIC BLOOD PRESSURE: 88 MMHG | BODY MASS INDEX: 26.14 KG/M2

## 2022-03-21 DIAGNOSIS — M25.511 CHRONIC RIGHT SHOULDER PAIN: Primary | ICD-10-CM

## 2022-03-21 DIAGNOSIS — G89.29 CHRONIC RIGHT SHOULDER PAIN: Primary | ICD-10-CM

## 2022-03-21 PROCEDURE — 99999 PR PBB SHADOW E&M-EST. PATIENT-LVL III: ICD-10-PCS | Mod: PBBFAC,,, | Performed by: INTERNAL MEDICINE

## 2022-03-21 PROCEDURE — 99214 OFFICE O/P EST MOD 30 MIN: CPT | Mod: 25,S$GLB,, | Performed by: INTERNAL MEDICINE

## 2022-03-21 PROCEDURE — 3077F SYST BP >= 140 MM HG: CPT | Mod: CPTII,S$GLB,, | Performed by: INTERNAL MEDICINE

## 2022-03-21 PROCEDURE — 20610 LARGE JOINT ASPIRATION/INJECTION: R GLENOHUMERAL: ICD-10-PCS | Mod: RT,S$GLB,, | Performed by: INTERNAL MEDICINE

## 2022-03-21 PROCEDURE — 1100F PR PT FALLS ASSESS DOC 2+ FALLS/FALL W/INJURY/YR: ICD-10-PCS | Mod: CPTII,S$GLB,, | Performed by: INTERNAL MEDICINE

## 2022-03-21 PROCEDURE — 99214 PR OFFICE/OUTPT VISIT, EST, LEVL IV, 30-39 MIN: ICD-10-PCS | Mod: 25,S$GLB,, | Performed by: INTERNAL MEDICINE

## 2022-03-21 PROCEDURE — 3079F PR MOST RECENT DIASTOLIC BLOOD PRESSURE 80-89 MM HG: ICD-10-PCS | Mod: CPTII,S$GLB,, | Performed by: INTERNAL MEDICINE

## 2022-03-21 PROCEDURE — 1160F PR REVIEW ALL MEDS BY PRESCRIBER/CLIN PHARMACIST DOCUMENTED: ICD-10-PCS | Mod: CPTII,S$GLB,, | Performed by: INTERNAL MEDICINE

## 2022-03-21 PROCEDURE — 1159F MED LIST DOCD IN RCRD: CPT | Mod: CPTII,S$GLB,, | Performed by: INTERNAL MEDICINE

## 2022-03-21 PROCEDURE — 20610 DRAIN/INJ JOINT/BURSA W/O US: CPT | Mod: RT,S$GLB,, | Performed by: INTERNAL MEDICINE

## 2022-03-21 PROCEDURE — 3079F DIAST BP 80-89 MM HG: CPT | Mod: CPTII,S$GLB,, | Performed by: INTERNAL MEDICINE

## 2022-03-21 PROCEDURE — 3288F PR FALLS RISK ASSESSMENT DOCUMENTED: ICD-10-PCS | Mod: CPTII,S$GLB,, | Performed by: INTERNAL MEDICINE

## 2022-03-21 PROCEDURE — 1159F PR MEDICATION LIST DOCUMENTED IN MEDICAL RECORD: ICD-10-PCS | Mod: CPTII,S$GLB,, | Performed by: INTERNAL MEDICINE

## 2022-03-21 PROCEDURE — 3077F PR MOST RECENT SYSTOLIC BLOOD PRESSURE >= 140 MM HG: ICD-10-PCS | Mod: CPTII,S$GLB,, | Performed by: INTERNAL MEDICINE

## 2022-03-21 PROCEDURE — 1160F RVW MEDS BY RX/DR IN RCRD: CPT | Mod: CPTII,S$GLB,, | Performed by: INTERNAL MEDICINE

## 2022-03-21 PROCEDURE — 99999 PR PBB SHADOW E&M-EST. PATIENT-LVL III: CPT | Mod: PBBFAC,,, | Performed by: INTERNAL MEDICINE

## 2022-03-21 PROCEDURE — 3288F FALL RISK ASSESSMENT DOCD: CPT | Mod: CPTII,S$GLB,, | Performed by: INTERNAL MEDICINE

## 2022-03-21 PROCEDURE — 1100F PTFALLS ASSESS-DOCD GE2>/YR: CPT | Mod: CPTII,S$GLB,, | Performed by: INTERNAL MEDICINE

## 2022-03-21 RX ORDER — TRIAMCINOLONE ACETONIDE 40 MG/ML
40 INJECTION, SUSPENSION INTRA-ARTICULAR; INTRAMUSCULAR
Status: DISCONTINUED | OUTPATIENT
Start: 2022-03-21 | End: 2022-03-21 | Stop reason: HOSPADM

## 2022-03-21 RX ADMIN — TRIAMCINOLONE ACETONIDE 40 MG: 40 INJECTION, SUSPENSION INTRA-ARTICULAR; INTRAMUSCULAR at 03:03

## 2022-03-21 NOTE — TELEPHONE ENCOUNTER
"Spoke with pt daughter, requesting to come in so patient can get a right should injection, advised we cannot guarantee the injection, would be up to the provider, asked how bad pt's pain is & if there is any swelling/difficulty moving shoulder, pt's daughter laughed and stated "she has dementia and all she know is that it hurts real bad". Advised 3:45pm opening in [facility] with Dr. VELASQUEZ, stated she will only bring pt if she can get an injection in her right shoulder, appt scheduled.     Please advise    Last ov- 2.8.22 with Vero jacobson knee injections/celestone   "

## 2022-03-21 NOTE — PROGRESS NOTES
RHEUMATOLOGY CLINIC FOLLOW UP VISIT  Chief complaints, HPI, ROS, EXAM, Assessment & Plans:-  Shannon Andrade a 81 y.o. pleasant female comes in for worsening right shoulder pain.  She follows up in the Rheumatology Clinic for osteoporosis.  On Reclast infusion yearly.  Last Reclast in February of this year.  Tolerating well.  Comes in today for an urgent visit-for worsening right shoulder pain-chronic achy progressive activity-related pain of right shoulder associated with stiffness and swelling.  Longstanding history of osteoarthritis.  Improved after receiving corticosteroid injections in the past.  Rheumatological review of system negative otherwise.  Physical examination shows severe tenderness restricting range of motion of right shoulder with mild effusion.  No erythema or cellulitis.    1. Chronic right shoulder pain      Problem List Items Addressed This Visit     Chronic right shoulder pain - Primary           Inject corticosteroid right shoulder joint for longstanding history of osteoarthritis.   Advised all precautions.    Large Joint Aspiration/Injection: R glenohumeral    Date/Time: 3/21/2022 3:45 PM  Performed by: Jun Whatley MD  Authorized by: Jun Whatley MD     Consent Done?:  Yes (Verbal)  Indications:  Joint swelling and pain  Site marked: the procedure site was marked    Timeout: prior to procedure the correct patient, procedure, and site was verified    Prep: patient was prepped and draped in usual sterile fashion    Local anesthesia used?: No    Anesthesia:  Local infiltration  Local anesthetic:  Lidocaine 2% without epinephrine  Anesthetic total (ml):  2      Details:  Needle Size:  25 G  Ultrasonic Guidance for needle placement?: No    Approach:  Posterior  Location:  Shoulder  Site:  R glenohumeral  Medications:  40 mg triamcinolone acetonide 40 mg/mL  Patient tolerance:  Patient tolerated the procedure well with  no immediate complications        # Follow up if symptoms worsen or fail to improve.      Disclaimer: This note was prepared using voice recognition system and is likely to have sound alike errors and is not proof read.  Please call me with any questions.

## 2022-06-24 ENCOUNTER — TELEPHONE (OUTPATIENT)
Dept: RHEUMATOLOGY | Facility: CLINIC | Age: 82
End: 2022-06-24
Payer: MEDICARE

## 2022-06-24 NOTE — TELEPHONE ENCOUNTER
----- Message from Rod Pratt sent at 6/24/2022  9:56 AM CDT -----  Contact: [phone]  Patient daughter Sussy would like to consult with a nurse in regards to scheduling an injection. Please call back at [phone]. Thanks r/s

## 2022-06-24 NOTE — TELEPHONE ENCOUNTER
"Daughter called and states that her mother can "barely move her legs". Scheduled patient for Monday for an evaluation, explained to daughter that the knee injections are normally administered for pain not for weakness, daughter requested appt to proceed with appt to discuss  "

## 2022-09-15 ENCOUNTER — HOSPITAL ENCOUNTER (EMERGENCY)
Facility: HOSPITAL | Age: 82
Discharge: HOME | End: 2022-09-16
Attending: EMERGENCY MEDICINE
Payer: MEDICARE

## 2022-09-15 DIAGNOSIS — R41.0 CONFUSION: ICD-10-CM

## 2022-09-15 DIAGNOSIS — K59.00 CONSTIPATION, UNSPECIFIED CONSTIPATION TYPE: ICD-10-CM

## 2022-09-15 DIAGNOSIS — R33.9 URINARY RETENTION: Primary | ICD-10-CM

## 2022-09-15 DIAGNOSIS — Z87.81 HISTORY OF FRACTURE OF LEFT ANKLE: ICD-10-CM

## 2022-09-15 LAB
ALBUMIN SERPL BCP-MCNC: 3.3 G/DL (ref 3.5–5.2)
ALP SERPL-CCNC: 78 U/L (ref 55–135)
ALT SERPL W/O P-5'-P-CCNC: 34 U/L (ref 10–44)
ANION GAP SERPL CALC-SCNC: 10 MMOL/L (ref 8–16)
AST SERPL-CCNC: 23 U/L (ref 10–40)
BACTERIA #/AREA URNS HPF: ABNORMAL /HPF
BASOPHILS # BLD AUTO: 0.06 K/UL (ref 0–0.2)
BASOPHILS NFR BLD: 0.7 % (ref 0–1.9)
BILIRUB SERPL-MCNC: 0.4 MG/DL (ref 0.1–1)
BILIRUB UR QL STRIP: NEGATIVE
BUN SERPL-MCNC: 33 MG/DL (ref 8–23)
CALCIUM SERPL-MCNC: 9.2 MG/DL (ref 8.7–10.5)
CHLORIDE SERPL-SCNC: 107 MMOL/L (ref 95–110)
CLARITY UR: CLEAR
CO2 SERPL-SCNC: 25 MMOL/L (ref 23–29)
COLOR UR: YELLOW
CREAT SERPL-MCNC: 0.8 MG/DL (ref 0.5–1.4)
DIFFERENTIAL METHOD: ABNORMAL
EOSINOPHIL # BLD AUTO: 0.4 K/UL (ref 0–0.5)
EOSINOPHIL NFR BLD: 4.2 % (ref 0–8)
ERYTHROCYTE [DISTWIDTH] IN BLOOD BY AUTOMATED COUNT: 12.9 % (ref 11.5–14.5)
EST. GFR  (NO RACE VARIABLE): >60 ML/MIN/1.73 M^2
GLUCOSE SERPL-MCNC: 88 MG/DL (ref 70–110)
GLUCOSE UR QL STRIP: NEGATIVE
HCT VFR BLD AUTO: 36.8 % (ref 37–48.5)
HGB BLD-MCNC: 11.6 G/DL (ref 12–16)
HGB UR QL STRIP: NEGATIVE
HYALINE CASTS #/AREA URNS LPF: 6 /LPF
IMM GRANULOCYTES # BLD AUTO: 0.04 K/UL (ref 0–0.04)
IMM GRANULOCYTES NFR BLD AUTO: 0.5 % (ref 0–0.5)
KETONES UR QL STRIP: NEGATIVE
LEUKOCYTE ESTERASE UR QL STRIP: NEGATIVE
LYMPHOCYTES # BLD AUTO: 2.8 K/UL (ref 1–4.8)
LYMPHOCYTES NFR BLD: 33 % (ref 18–48)
MCH RBC QN AUTO: 29.7 PG (ref 27–31)
MCHC RBC AUTO-ENTMCNC: 31.5 G/DL (ref 32–36)
MCV RBC AUTO: 94 FL (ref 82–98)
MICROSCOPIC COMMENT: ABNORMAL
MONOCYTES # BLD AUTO: 0.8 K/UL (ref 0.3–1)
MONOCYTES NFR BLD: 9.8 % (ref 4–15)
NEUTROPHILS # BLD AUTO: 4.3 K/UL (ref 1.8–7.7)
NEUTROPHILS NFR BLD: 51.8 % (ref 38–73)
NITRITE UR QL STRIP: NEGATIVE
NRBC BLD-RTO: 0 /100 WBC
PH UR STRIP: 6 [PH] (ref 5–8)
PLATELET # BLD AUTO: 174 K/UL (ref 150–450)
PMV BLD AUTO: 9.8 FL (ref 9.2–12.9)
POTASSIUM SERPL-SCNC: 4.3 MMOL/L (ref 3.5–5.1)
PROT SERPL-MCNC: 6.6 G/DL (ref 6–8.4)
PROT UR QL STRIP: ABNORMAL
RBC # BLD AUTO: 3.91 M/UL (ref 4–5.4)
RBC #/AREA URNS HPF: 1 /HPF (ref 0–4)
SODIUM SERPL-SCNC: 142 MMOL/L (ref 136–145)
SP GR UR STRIP: 1.02 (ref 1–1.03)
URN SPEC COLLECT METH UR: ABNORMAL
UROBILINOGEN UR STRIP-ACNC: NEGATIVE EU/DL
WBC # BLD AUTO: 8.37 K/UL (ref 3.9–12.7)
WBC #/AREA URNS HPF: 2 /HPF (ref 0–5)
WBC CLUMPS URNS QL MICRO: ABNORMAL
YEAST URNS QL MICRO: ABNORMAL

## 2022-09-15 PROCEDURE — 51702 INSERT TEMP BLADDER CATH: CPT

## 2022-09-15 PROCEDURE — 80053 COMPREHEN METABOLIC PANEL: CPT | Performed by: EMERGENCY MEDICINE

## 2022-09-15 PROCEDURE — 96374 THER/PROPH/DIAG INJ IV PUSH: CPT | Mod: 59

## 2022-09-15 PROCEDURE — 93010 ELECTROCARDIOGRAM REPORT: CPT | Mod: ,,, | Performed by: INTERNAL MEDICINE

## 2022-09-15 PROCEDURE — 25500020 PHARM REV CODE 255: Performed by: EMERGENCY MEDICINE

## 2022-09-15 PROCEDURE — 93005 ELECTROCARDIOGRAM TRACING: CPT | Mod: 59

## 2022-09-15 PROCEDURE — A9698 NON-RAD CONTRAST MATERIALNOC: HCPCS | Performed by: EMERGENCY MEDICINE

## 2022-09-15 PROCEDURE — 93010 EKG 12-LEAD: ICD-10-PCS | Mod: ,,, | Performed by: INTERNAL MEDICINE

## 2022-09-15 PROCEDURE — 63600175 PHARM REV CODE 636 W HCPCS: Performed by: EMERGENCY MEDICINE

## 2022-09-15 PROCEDURE — 81000 URINALYSIS NONAUTO W/SCOPE: CPT | Performed by: EMERGENCY MEDICINE

## 2022-09-15 PROCEDURE — 99285 EMERGENCY DEPT VISIT HI MDM: CPT | Mod: 25

## 2022-09-15 PROCEDURE — 85025 COMPLETE CBC W/AUTO DIFF WBC: CPT | Performed by: EMERGENCY MEDICINE

## 2022-09-15 RX ORDER — ONDANSETRON 2 MG/ML
4 INJECTION INTRAMUSCULAR; INTRAVENOUS
Status: COMPLETED | OUTPATIENT
Start: 2022-09-15 | End: 2022-09-15

## 2022-09-15 RX ADMIN — ONDANSETRON 4 MG: 2 INJECTION INTRAMUSCULAR; INTRAVENOUS at 01:09

## 2022-09-15 RX ADMIN — IOHEXOL 500 ML: 9 SOLUTION ORAL at 02:09

## 2022-09-15 NOTE — PLAN OF CARE
Spoke to pt's daughter. Referred pt to [facility].    Oliver Gleason LMSW 9/15/2022 1:54 PM

## 2022-09-15 NOTE — NURSING
Discharge instructions given to daughter and questions answered. Removed PIV. Awaiting transport to home

## 2022-09-15 NOTE — ED PROVIDER NOTES
SCRIBE #1 NOTE: I, Wesly Hua, am scribing for, and in the presence of, Jorge Nunez MD. I have scribed the entire note.       History     Chief Complaint   Patient presents with    Urinary Retention     Urinary retention, confusion.  History of dementia       Review of patient's allergies indicates:  No Known Allergies      History of Present Illness     HPI    9/15/2022, 12:37 PM  History obtained from the  EMS and patient      History of Present Illness: Shannon Solano is a 82 y.o. female patient with a PMHx of anemia, arthritis, cataract, disorder of kidney and ureter, HLD, HTN, insomnia, mixed dementia, osteoarthritis, polyneuropathy, stroke who presents to the Emergency Department for evaluation of urinary retention which onset several days PTA. She was discharged from a rehab facility after a fall where she broke her left ankle, and has not been able to urinate since. Symptoms are constant and moderate in severity. No mitigating or exacerbating factors reported. Associated sxs include disorientation to time which is her baseline per her daughter. Patient denies any n/v/d, fever, chills, and all other sxs at this time. No prior Tx reported. No further complaints or concerns at this time.       Arrival mode: EMS     PCP: Leslie Poe MD        Past Medical History:  Past Medical History:   Diagnosis Date    Anemia     Arthritis     Cataract     Disorder of kidney and ureter     Hyperlipidemia     Hypertension     Insomnia 8/28/2019    Mixed dementia 4/7/2021    Osteoarthritis     Polyneuropathy     Stroke        Past Surgical History:  Past Surgical History:   Procedure Laterality Date    APPENDECTOMY      CATARACT EXTRACTION Bilateral 2014    EYE SURGERY Bilateral     HYSTERECTOMY      VARICOSE VEIN SURGERY           Family History:  Family History   Problem Relation Age of Onset    Breast cancer Sister     Heart disease Sister     Colon cancer Sister         age 70    Cancer Sister          breast    Obesity Mother     Stroke Mother     Obesity Brother     Alzheimer's disease Brother     Cancer Brother     No Known Problems Sister     Hypertension Daughter     Melanoma Neg Hx        Social History:  Social History     Tobacco Use    Smoking status: Never    Smokeless tobacco: Never   Substance and Sexual Activity    Alcohol use: No    Drug use: No    Sexual activity: Not Currently        Review of Systems     Review of Systems   Constitutional:  Negative for chills and fever.   HENT:  Negative for sore throat.    Respiratory:  Negative for shortness of breath.    Cardiovascular:  Negative for chest pain.   Gastrointestinal:  Negative for diarrhea, nausea and vomiting.   Genitourinary:  Positive for difficulty urinating (Retention). Negative for dysuria.   Musculoskeletal:  Negative for back pain.   Skin:  Negative for rash.   Neurological:  Negative for weakness.   Hematological:  Does not bruise/bleed easily.   Psychiatric/Behavioral:  Positive for confusion (Disoriented to time).    All other systems reviewed and are negative.     Physical Exam     Initial Vitals   BP Pulse Resp Temp SpO2   09/15/22 1350 09/15/22 1248 09/15/22 1248 09/15/22 1400 09/15/22 1350   (!) 155/63 (!) 55 18 98.7 °F (37.1 °C) 96 %      MAP       --                 Physical Exam  Nursing Notes and Vital Signs Reviewed.  Constitutional: Patient is in no acute distress. Well-developed and well-nourished.  Head: Atraumatic. Normocephalic.  Eyes: PERRL. EOM intact. Conjunctivae are not pale. No scleral icterus.  ENT: Mucous membranes are moist. Oropharynx is clear and symmetric.    Neck: Supple. Full ROM. No lymphadenopathy.  Cardiovascular: Regular rate. Regular rhythm. No murmurs, rubs, or gallops. Distal pulses are 2+ and symmetric.  Pulmonary/Chest: No respiratory distress. Clear to auscultation bilaterally. No wheezing or rales.  Abdominal: Soft. Suprapubic tenderness with palpable bladder.  No rebound, guarding, or  rigidity. Good bowel sounds.  Genitourinary: No CVA tenderness  Musculoskeletal: Moves all extremities. No obvious deformities. No edema. No calf tenderness. Left ankle surgical scar is c/d/I and well healing.   Skin: Warm and dry.  Neurological:  Alert, awake, and appropriate.  Disoriented to time. Normal speech.  No acute focal neurological deficits are appreciated.  Psychiatric: Normal affect. Good eye contact. Appropriate in content.     ED Course   Procedures  ED Vital Signs:  Vitals:    09/15/22 1248 09/15/22 1350 09/15/22 1400   BP:  (!) 155/63    Pulse: (!) 55 (!) 59    Resp: 18 17    Temp:   98.7 °F (37.1 °C)   TempSrc:   Oral   SpO2:  96%    Weight:  80.3 kg (177 lb)        Abnormal Lab Results:  Labs Reviewed   CBC W/ AUTO DIFFERENTIAL - Abnormal; Notable for the following components:       Result Value    RBC 3.91 (*)     Hemoglobin 11.6 (*)     Hematocrit 36.8 (*)     MCHC 31.5 (*)     All other components within normal limits   COMPREHENSIVE METABOLIC PANEL - Abnormal; Notable for the following components:    BUN 33 (*)     Albumin 3.3 (*)     All other components within normal limits   URINALYSIS, REFLEX TO URINE CULTURE - Abnormal; Notable for the following components:    Protein, UA 1+ (*)     All other components within normal limits    Narrative:     Specimen Source->Urine   URINALYSIS MICROSCOPIC - Abnormal; Notable for the following components:    WBC Clumps, UA Occasional (*)     Yeast, UA Occasional (*)     Hyaline Casts, UA 6 (*)     All other components within normal limits    Narrative:     Specimen Source->Urine        All Lab Results:  Results for orders placed or performed during the hospital encounter of 09/15/22   CBC auto differential   Result Value Ref Range    WBC 8.37 3.90 - 12.70 K/uL    RBC 3.91 (L) 4.00 - 5.40 M/uL    Hemoglobin 11.6 (L) 12.0 - 16.0 g/dL    Hematocrit 36.8 (L) 37.0 - 48.5 %    MCV 94 82 - 98 fL    MCH 29.7 27.0 - 31.0 pg    MCHC 31.5 (L) 32.0 - 36.0 g/dL    RDW  12.9 11.5 - 14.5 %    Platelets 174 150 - 450 K/uL    MPV 9.8 9.2 - 12.9 fL    Immature Granulocytes 0.5 0.0 - 0.5 %    Gran # (ANC) 4.3 1.8 - 7.7 K/uL    Immature Grans (Abs) 0.04 0.00 - 0.04 K/uL    Lymph # 2.8 1.0 - 4.8 K/uL    Mono # 0.8 0.3 - 1.0 K/uL    Eos # 0.4 0.0 - 0.5 K/uL    Baso # 0.06 0.00 - 0.20 K/uL    nRBC 0 0 /100 WBC    Gran % 51.8 38.0 - 73.0 %    Lymph % 33.0 18.0 - 48.0 %    Mono % 9.8 4.0 - 15.0 %    Eosinophil % 4.2 0.0 - 8.0 %    Basophil % 0.7 0.0 - 1.9 %    Differential Method Automated    Comprehensive metabolic panel   Result Value Ref Range    Sodium 142 136 - 145 mmol/L    Potassium 4.3 3.5 - 5.1 mmol/L    Chloride 107 95 - 110 mmol/L    CO2 25 23 - 29 mmol/L    Glucose 88 70 - 110 mg/dL    BUN 33 (H) 8 - 23 mg/dL    Creatinine 0.8 0.5 - 1.4 mg/dL    Calcium 9.2 8.7 - 10.5 mg/dL    Total Protein 6.6 6.0 - 8.4 g/dL    Albumin 3.3 (L) 3.5 - 5.2 g/dL    Total Bilirubin 0.4 0.1 - 1.0 mg/dL    Alkaline Phosphatase 78 55 - 135 U/L    AST 23 10 - 40 U/L    ALT 34 10 - 44 U/L    Anion Gap 10 8 - 16 mmol/L    eGFR >60 >60 mL/min/1.73 m^2   Urinalysis, Reflex to Urine Culture Urine, Catheterized    Specimen: Urine   Result Value Ref Range    Specimen UA Urine, Catheterized     Color, UA Yellow Yellow, Straw, Madai    Appearance, UA Clear Clear    pH, UA 6.0 5.0 - 8.0    Specific Gravity, UA 1.025 1.005 - 1.030    Protein, UA 1+ (A) Negative    Glucose, UA Negative Negative    Ketones, UA Negative Negative    Bilirubin (UA) Negative Negative    Occult Blood UA Negative Negative    Nitrite, UA Negative Negative    Urobilinogen, UA Negative <2.0 EU/dL    Leukocytes, UA Negative Negative   Urinalysis Microscopic   Result Value Ref Range    RBC, UA 1 0 - 4 /hpf    WBC, UA 2 0 - 5 /hpf    WBC Clumps, UA Occasional (A) None-Rare    Bacteria Rare None-Occ /hpf    Yeast, UA Occasional (A) None    Hyaline Casts, UA 6 (A) 0-1/lpf /lpf    Microscopic Comment SEE COMMENT          Imaging Results:  Imaging  Results              CT Abdomen Pelvis  Without Contrast (Final result)  Result time 09/15/22 14:47:17      Final result by Fredo Lim MD (09/15/22 14:47:17)                   Impression:      No evidence of bowel obstruction.  Diverticulosis without evidence of acute diverticulitis.    All CT scans at this facility use dose modulation, iterative reconstruction, and/or weight based dosing when appropriate to reduce radiation dose to as low as reasonable achievable.      Electronically signed by: Fredo Lim MD  Date:    09/15/2022  Time:    14:47               Narrative:    EXAMINATION:  CT ABDOMEN PELVIS WITHOUT CONTRAST    CLINICAL HISTORY:  Bowel obstruction suspected;    TECHNIQUE:  Low dose axial images, sagittal and coronal reformations were obtained from the lung bases to the pubic symphysis.  Oral contrast was not administered.    COMPARISON:  None    FINDINGS:  Heart: Normal size. No effusion.    Lung Bases: Clear.    Liver: Normal size and attenuation. No focal lesions.    Gallbladder: Not seen.    Bile Ducts: No dilatation.    Pancreas: No obvious mass. No peripancreatic fat stranding.    Spleen: Normal.    Adrenals: Normal.    Kidneys/Ureters: Renal cortical thinning.  No mass, hydroureteronephrosis, or nephroureterolithiasis.    Bladder: No wall thickening.  Grove catheter noted within the bladder which is largely collapsed.  Air within the bladder thought to reflect instrumentation.    Reproductive organs: Normal.    GI Tract/Mesentery: No evidence of bowel obstruction or inflammation.  Extensive sigmoid diverticulosis.  No evidence of acute diverticulitis.  Moderate constipation throughout the large bowel.  No evidence of appendicitis.    Peritoneal Space: No ascites or free air.    Retroperitoneum: No significant adenopathy.    Abdominal wall: Normal.    Vasculature: No aneurysm.  Moderate atherosclerotic disease.    Bones: No acute fracture. No suspicious lytic or sclerotic lesions.                                             EKG Readings: (Independently Interpreted)   Rate of 60 beats per minute.  Sinus rhythm with premature atrial complexes.  Normal axis.  P.r., QRS and QTC intervals within normal limits.  No STEMI.      The Emergency Provider reviewed the vital signs and test results, which are outlined above.     ED Discussion   1:18 PM: Re-evaluated pt. Pt is resting comfortably and is in no acute distress.  Pt's daughter states pt isn't confused and that she has dementia. Also reports pt hasn't urinated or had a BM since she left [facility].  D/w pt all pertinent results. D/w pt any concerns expressed at this time. Answered all questions. Pt expresses understanding at this time.      3:02 PM: Reassessed pt at this time. Family member at bedside explained that pt was on hospice prior to breaking her ankle, and they would like to bring her home back on hospice. I spoke with , Oliver (see notes), and he discussed the case with family, and with [facility] who will evaluate the patient when she gest home. Discussed with pt all pertinent ED information and results. Discussed pt dx and plan of tx. Gave pt all f/u and return to the ED instructions. All questions and concerns were addressed at this time. Pt family expresses understanding of information and instructions, and is comfortable with plan to discharge. Pt is stable for discharge home on hospice. Transportation was arranged by nursing.     I discussed with patient and/or family/caretaker that evaluation in the ED does not suggest any emergent or life threatening medical conditions requiring immediate intervention beyond what was provided in the ED, and I believe patient is safe for discharge.  Regardless, an unremarkable evaluation in the ED does not preclude the development or presence of a serious of life threatening condition. As such, patient was instructed to return immediately for any worsening or change in current  symptoms.         Medical Decision Making:   Clinical Tests:   Lab Tests: Ordered and Reviewed  Radiological Study: Ordered and Reviewed         ED Medication(s):  Medications   ondansetron injection 4 mg (4 mg Intravenous Given 9/15/22 1348)   iohexoL (OMNIPAQUE 9) oral solution 500 mL (500 mLs Oral Given 9/15/22 1437)       New Prescriptions    No medications on file        Follow-up Information       [facility]. Schedule an appointment as soon as possible for a visit in 2 days.    Why: For re-evaluation and further treatment  Contact information:  [address] [address], [place] [id number] [phone]             [facility]. Schedule an appointment as soon as possible for a visit in 3 days.    Specialty: Urology  Why: For re-evaluation and further treatment  Contact information:  [address] [facility] [address] [phone]  Additional information:  Please park on the [address] side and use the Clinic entrance. Check in on the 3rd floor, to the right.             [facility] - Emergency Dept.. Go today.    Specialty: Emergency Medicine  Why: If symptoms worsen, For re-evaluation and further treatment, As needed  Contact information:  [address]  [facility] [address] [phone]                               Scribe Attestation:   Scribe #1: I performed the above scribed service and the documentation accurately describes the services I performed. I attest to the accuracy of the note.     Attending:   Physician Attestation Statement for Scribe #1: I, Jorge Nunez MD, personally performed the services described in this documentation, as scribed by Wesly Hua, in my presence, and it is both accurate and complete.           Clinical Impression       ICD-10-CM ICD-9-CM   1. Urinary retention  R33.9 788.20   2. Confusion  R41.0 298.9   3. Constipation, unspecified constipation type  K59.00 564.00   4. History of  fracture of left ankle  Z87.81 V15.51       Disposition:   Disposition: Discharged  Condition: Stable       Jorge Nunez MD  09/15/22 2121

## 2022-09-16 ENCOUNTER — TELEPHONE (OUTPATIENT)
Dept: FAMILY MEDICINE | Facility: CLINIC | Age: 82
End: 2022-09-16
Payer: MEDICARE

## 2022-09-16 VITALS
DIASTOLIC BLOOD PRESSURE: 61 MMHG | OXYGEN SATURATION: 93 % | TEMPERATURE: 99 F | SYSTOLIC BLOOD PRESSURE: 127 MMHG | BODY MASS INDEX: 28.57 KG/M2 | HEART RATE: 53 BPM | WEIGHT: 177 LBS | RESPIRATION RATE: 15 BRPM

## 2022-09-16 NOTE — TELEPHONE ENCOUNTER
----- Message from Juanita Miller sent at 9/16/2022  9:29 AM CDT -----  Contact: sussy-daughter  The patient daughter is requesting a  callback from the nurse in regards to the patient care.  Dr Esparza referred the patient to [facility] and the patient has been kicked out of that program. She was sent to [facility] but did not meet the requirements but her daughter says hospice is required. The patient is in a wheelchair, bedridden and has a broken ankle and need transportation.    The patient is low on medications.    The patient is not allowing her daughter to bath her and she really need assistance.    Please call Sussy at [phone] (home)     Thanks

## 2022-09-16 NOTE — TELEPHONE ENCOUNTER
There is no transportation for pt, as she is bedridden. Does she need an order for HH or hospice?   Please advise.

## 2022-09-28 NOTE — TELEPHONE ENCOUNTER
Returned call and spoek with pts daughter. She states that pt is in a lot of pain and has a lot of burning. Has not slept much due to issues and they would like to know if there is something that can be sent over to help patient while waiting on the culture to come back.    28-Sep-2022 12:08

## 2022-10-27 PROCEDURE — 96360 HYDRATION IV INFUSION INIT: CPT

## 2022-10-27 PROCEDURE — 87502 INFLUENZA DNA AMP PROBE: CPT

## 2022-10-27 PROCEDURE — 99285 EMERGENCY DEPT VISIT HI MDM: CPT | Mod: 25

## 2022-10-28 ENCOUNTER — HOSPITAL ENCOUNTER (EMERGENCY)
Facility: HOSPITAL | Age: 82
Discharge: HOME | End: 2022-10-28
Attending: EMERGENCY MEDICINE
Payer: MEDICARE

## 2022-10-28 VITALS
SYSTOLIC BLOOD PRESSURE: 160 MMHG | HEART RATE: 75 BPM | TEMPERATURE: 99 F | OXYGEN SATURATION: 95 % | RESPIRATION RATE: 18 BRPM | DIASTOLIC BLOOD PRESSURE: 62 MMHG

## 2022-10-28 DIAGNOSIS — R53.1 WEAKNESS: ICD-10-CM

## 2022-10-28 LAB
ALBUMIN SERPL BCP-MCNC: 3.3 G/DL (ref 3.5–5.2)
ALP SERPL-CCNC: 84 U/L (ref 55–135)
ALT SERPL W/O P-5'-P-CCNC: 11 U/L (ref 10–44)
ANION GAP SERPL CALC-SCNC: 13 MMOL/L (ref 8–16)
AST SERPL-CCNC: 14 U/L (ref 10–40)
BACTERIA #/AREA URNS HPF: NORMAL /HPF
BASOPHILS # BLD AUTO: 0.06 K/UL (ref 0–0.2)
BASOPHILS NFR BLD: 0.5 % (ref 0–1.9)
BILIRUB SERPL-MCNC: 0.7 MG/DL (ref 0.1–1)
BILIRUB UR QL STRIP: ABNORMAL
BUN SERPL-MCNC: 23 MG/DL (ref 8–23)
CALCIUM SERPL-MCNC: 9.1 MG/DL (ref 8.7–10.5)
CHLORIDE SERPL-SCNC: 105 MMOL/L (ref 95–110)
CLARITY UR: CLEAR
CO2 SERPL-SCNC: 24 MMOL/L (ref 23–29)
COLOR UR: YELLOW
CREAT SERPL-MCNC: 0.8 MG/DL (ref 0.5–1.4)
DIFFERENTIAL METHOD: ABNORMAL
EOSINOPHIL # BLD AUTO: 0.1 K/UL (ref 0–0.5)
EOSINOPHIL NFR BLD: 0.5 % (ref 0–8)
ERYTHROCYTE [DISTWIDTH] IN BLOOD BY AUTOMATED COUNT: 13.8 % (ref 11.5–14.5)
EST. GFR  (NO RACE VARIABLE): >60 ML/MIN/1.73 M^2
GLUCOSE SERPL-MCNC: 98 MG/DL (ref 70–110)
GLUCOSE UR QL STRIP: NEGATIVE
HCT VFR BLD AUTO: 36.6 % (ref 37–48.5)
HGB BLD-MCNC: 12 G/DL (ref 12–16)
HGB UR QL STRIP: NEGATIVE
HYALINE CASTS #/AREA URNS LPF: 1 /LPF
IMM GRANULOCYTES # BLD AUTO: 0.08 K/UL (ref 0–0.04)
IMM GRANULOCYTES NFR BLD AUTO: 0.6 % (ref 0–0.5)
INFLUENZA A, MOLECULAR: NEGATIVE
INFLUENZA B, MOLECULAR: NEGATIVE
KETONES UR QL STRIP: ABNORMAL
LACTATE SERPL-SCNC: 0.9 MMOL/L (ref 0.5–2.2)
LEUKOCYTE ESTERASE UR QL STRIP: NEGATIVE
LIPASE SERPL-CCNC: <3 U/L (ref 4–60)
LYMPHOCYTES # BLD AUTO: 2.4 K/UL (ref 1–4.8)
LYMPHOCYTES NFR BLD: 18.3 % (ref 18–48)
MCH RBC QN AUTO: 30.2 PG (ref 27–31)
MCHC RBC AUTO-ENTMCNC: 32.8 G/DL (ref 32–36)
MCV RBC AUTO: 92 FL (ref 82–98)
MICROSCOPIC COMMENT: NORMAL
MONOCYTES # BLD AUTO: 1.1 K/UL (ref 0.3–1)
MONOCYTES NFR BLD: 8.4 % (ref 4–15)
NEUTROPHILS # BLD AUTO: 9.4 K/UL (ref 1.8–7.7)
NEUTROPHILS NFR BLD: 71.7 % (ref 38–73)
NITRITE UR QL STRIP: NEGATIVE
NRBC BLD-RTO: 0 /100 WBC
PH UR STRIP: 6 [PH] (ref 5–8)
PLATELET # BLD AUTO: 219 K/UL (ref 150–450)
PMV BLD AUTO: 9.2 FL (ref 9.2–12.9)
POTASSIUM SERPL-SCNC: 3.5 MMOL/L (ref 3.5–5.1)
PROT SERPL-MCNC: 6.8 G/DL (ref 6–8.4)
PROT UR QL STRIP: ABNORMAL
RBC # BLD AUTO: 3.97 M/UL (ref 4–5.4)
RBC #/AREA URNS HPF: 1 /HPF (ref 0–4)
SARS-COV-2 RDRP RESP QL NAA+PROBE: NEGATIVE
SODIUM SERPL-SCNC: 142 MMOL/L (ref 136–145)
SP GR UR STRIP: 1.03 (ref 1–1.03)
SPECIMEN SOURCE: NORMAL
TROPONIN I SERPL DL<=0.01 NG/ML-MCNC: 0.01 NG/ML (ref 0–0.03)
URN SPEC COLLECT METH UR: ABNORMAL
UROBILINOGEN UR STRIP-ACNC: ABNORMAL EU/DL
WBC # BLD AUTO: 13.05 K/UL (ref 3.9–12.7)
WBC #/AREA URNS HPF: 0 /HPF (ref 0–5)

## 2022-10-28 PROCEDURE — 83690 ASSAY OF LIPASE: CPT | Performed by: EMERGENCY MEDICINE

## 2022-10-28 PROCEDURE — 25000003 PHARM REV CODE 250: Performed by: EMERGENCY MEDICINE

## 2022-10-28 PROCEDURE — 93005 ELECTROCARDIOGRAM TRACING: CPT

## 2022-10-28 PROCEDURE — 93010 ELECTROCARDIOGRAM REPORT: CPT | Mod: ,,, | Performed by: INTERNAL MEDICINE

## 2022-10-28 PROCEDURE — 83605 ASSAY OF LACTIC ACID: CPT | Performed by: EMERGENCY MEDICINE

## 2022-10-28 PROCEDURE — 80053 COMPREHEN METABOLIC PANEL: CPT | Performed by: EMERGENCY MEDICINE

## 2022-10-28 PROCEDURE — 93010 EKG 12-LEAD: ICD-10-PCS | Mod: ,,, | Performed by: INTERNAL MEDICINE

## 2022-10-28 PROCEDURE — 85025 COMPLETE CBC W/AUTO DIFF WBC: CPT | Performed by: EMERGENCY MEDICINE

## 2022-10-28 PROCEDURE — 84484 ASSAY OF TROPONIN QUANT: CPT | Performed by: EMERGENCY MEDICINE

## 2022-10-28 PROCEDURE — 87502 INFLUENZA DNA AMP PROBE: CPT | Performed by: EMERGENCY MEDICINE

## 2022-10-28 PROCEDURE — U0002 COVID-19 LAB TEST NON-CDC: HCPCS | Performed by: EMERGENCY MEDICINE

## 2022-10-28 PROCEDURE — 81000 URINALYSIS NONAUTO W/SCOPE: CPT | Performed by: EMERGENCY MEDICINE

## 2022-10-28 RX ADMIN — SODIUM CHLORIDE 500 ML: 0.9 INJECTION, SOLUTION INTRAVENOUS at 01:10

## 2022-10-28 NOTE — ED NOTES
Family member requests to leave hospital without hospice admittance. She states she will take her home on her own.

## 2022-10-28 NOTE — ED NOTES
Spoke to Florecita at [facility], she states patient's family member is aware they would be revoked and that she may be discharged home but there will be a delay in readmission and may not be readmitted to the same hospice care. [phone]

## 2022-10-28 NOTE — ED PROVIDER NOTES
SCRIBE #1 NOTE: I, Pilo Menard, am scribing for, and in the presence of, Samantha Che MD. I have scribed the entire note.       History     Chief Complaint   Patient presents with    Dysphagia     Pt is reporting difficulty swallowing x few months. Hospice revoked for evaluation.     Review of patient's allergies indicates:  No Known Allergies      History of Present Illness     HPI    10/28/2022, 1:33 AM  History obtained from the daughter and patient      History of Present Illness: Shannon Solano is a 82 y.o. female patient with a PMHx of anemic, stroke, and HLD who presents to the Emergency Department for evaluation of dysphagia which onset gradually x 3 days. Pt has been having difficulty eating drinking and taking her medications and she can not swallow anything without coughing it back up. Symptoms are constant and moderate in severity. No mitigating or exacerbating factors reported. Associated sxs include tremors. Patient denies any f/c, n/v/d, CP, HA, SOB, and all other sxs at this time. No prior Tx reported. No further complaints or concerns at this time.       Arrival mode: Ambulance service     PCP: Leslie Poe MD        Past Medical History:  Past Medical History:   Diagnosis Date    Anemia     Arthritis     Cataract     Disorder of kidney and ureter     Hyperlipidemia     Hypertension     Insomnia 8/28/2019    Mixed dementia 4/7/2021    Osteoarthritis     Polyneuropathy     Stroke        Past Surgical History:  Past Surgical History:   Procedure Laterality Date    APPENDECTOMY      CATARACT EXTRACTION Bilateral 2014    EYE SURGERY Bilateral     HYSTERECTOMY      VARICOSE VEIN SURGERY           Family History:  Family History   Problem Relation Age of Onset    Breast cancer Sister     Heart disease Sister     Colon cancer Sister         age 70    Cancer Sister         breast    Obesity Mother     Stroke Mother     Obesity Brother     Alzheimer's disease Brother     Cancer Brother     No  Known Problems Sister     Hypertension Daughter     Melanoma Neg Hx        Social History:  Social History     Tobacco Use    Smoking status: Never    Smokeless tobacco: Never   Substance and Sexual Activity    Alcohol use: No    Drug use: No    Sexual activity: Not Currently        Review of Systems     Review of Systems   Constitutional:  Negative for chills and fever.   HENT:  Positive for trouble swallowing. Negative for sore throat.    Respiratory:  Positive for cough. Negative for shortness of breath.    Cardiovascular:  Negative for chest pain.   Gastrointestinal:  Negative for diarrhea, nausea and vomiting.   Genitourinary:  Negative for dysuria.   Musculoskeletal:  Negative for back pain.   Skin:  Negative for rash.   Neurological:  Positive for tremors. Negative for weakness and headaches.   Hematological:  Does not bruise/bleed easily.   All other systems reviewed and are negative.     Physical Exam     Initial Vitals [10/28/22 0022]   BP Pulse Resp Temp SpO2   (!) 160/80 80 18 99.7 °F (37.6 °C) 97 %      MAP       --          Physical Exam  Nursing Notes and Vital Signs Reviewed.  Constitutional: Patient is in mild distress. Well-developed and well-nourished.  Head: Atraumatic. Normocephalic.  Eyes: PERRL. EOM intact. Conjunctivae are not pale. No scleral icterus.  ENT: Mucous membranes are dry. Oropharynx is clear and symmetric.    Neck: Supple. Full ROM. No lymphadenopathy.  Cardiovascular: Regular rate. Regular rhythm. No murmurs, rubs, or gallops. Distal pulses are 2+ and symmetric.  Pulmonary/Chest: No respiratory distress. Clear to auscultation bilaterally. No wheezing or rales.  Abdominal: Soft and non-distended.  There is no tenderness.  No rebound, guarding, or rigidity. Good bowel sounds.  Genitourinary: No CVA tenderness  Musculoskeletal: Moves all extremities. No obvious deformities. No edema. No calf tenderness.  Skin: Warm and dry.  Neurological:  Alert, awake, and appropriate.  Normal  speech.  No acute focal neurological deficits are appreciated.  Psychiatric: Normal affect. Good eye contact. Appropriate in content.     ED Course   Procedures  ED Vital Signs:  Vitals:    10/28/22 0022 10/28/22 0203 10/28/22 0348 10/28/22 0417   BP: (!) 160/80 (!) 152/94 (!) 150/65 (!) 146/65   Pulse: 80 78 80 82   Resp: 18 19 (!) 24 (!) 23   Temp: 99.7 °F (37.6 °C)      TempSrc: Oral      SpO2: 97% 96% 96% 97%    10/28/22 0617 10/28/22 0648 10/28/22 0712   BP: (!) 143/65 (!) 157/63 (!) 160/62   Pulse: 78 74 75   Resp: 18 19 18   Temp:   99.2 °F (37.3 °C)   TempSrc:   Oral   SpO2: 96% 95% 95%       Abnormal Lab Results:  Labs Reviewed   CBC W/ AUTO DIFFERENTIAL - Abnormal; Notable for the following components:       Result Value    WBC 13.05 (*)     RBC 3.97 (*)     Hematocrit 36.6 (*)     Immature Granulocytes 0.6 (*)     Gran # (ANC) 9.4 (*)     Immature Grans (Abs) 0.08 (*)     Mono # 1.1 (*)     All other components within normal limits   COMPREHENSIVE METABOLIC PANEL - Abnormal; Notable for the following components:    Albumin 3.3 (*)     All other components within normal limits   URINALYSIS, REFLEX TO URINE CULTURE - Abnormal; Notable for the following components:    Protein, UA 1+ (*)     Ketones, UA 2+ (*)     Bilirubin (UA) 1+ (*)     Urobilinogen, UA 2.0-3.0 (*)     All other components within normal limits    Narrative:     Specimen Source->Urine   LIPASE - Abnormal; Notable for the following components:    Lipase <3 (*)     All other components within normal limits   INFLUENZA A & B BY MOLECULAR   LACTIC ACID, PLASMA   TROPONIN I   SARS-COV-2 RNA AMPLIFICATION, QUAL   URINALYSIS MICROSCOPIC    Narrative:     Specimen Source->Urine        All Lab Results:  Results for orders placed or performed during the hospital encounter of 10/28/22   Influenza A & B by Molecular    Specimen: Nasopharyngeal Swab   Result Value Ref Range    Influenza A, Molecular Negative Negative    Influenza B, Molecular Negative  Negative    Flu A & B Source NP    CBC auto differential   Result Value Ref Range    WBC 13.05 (H) 3.90 - 12.70 K/uL    RBC 3.97 (L) 4.00 - 5.40 M/uL    Hemoglobin 12.0 12.0 - 16.0 g/dL    Hematocrit 36.6 (L) 37.0 - 48.5 %    MCV 92 82 - 98 fL    MCH 30.2 27.0 - 31.0 pg    MCHC 32.8 32.0 - 36.0 g/dL    RDW 13.8 11.5 - 14.5 %    Platelets 219 150 - 450 K/uL    MPV 9.2 9.2 - 12.9 fL    Immature Granulocytes 0.6 (H) 0.0 - 0.5 %    Gran # (ANC) 9.4 (H) 1.8 - 7.7 K/uL    Immature Grans (Abs) 0.08 (H) 0.00 - 0.04 K/uL    Lymph # 2.4 1.0 - 4.8 K/uL    Mono # 1.1 (H) 0.3 - 1.0 K/uL    Eos # 0.1 0.0 - 0.5 K/uL    Baso # 0.06 0.00 - 0.20 K/uL    nRBC 0 0 /100 WBC    Gran % 71.7 38.0 - 73.0 %    Lymph % 18.3 18.0 - 48.0 %    Mono % 8.4 4.0 - 15.0 %    Eosinophil % 0.5 0.0 - 8.0 %    Basophil % 0.5 0.0 - 1.9 %    Differential Method Automated    Comprehensive metabolic panel   Result Value Ref Range    Sodium 142 136 - 145 mmol/L    Potassium 3.5 3.5 - 5.1 mmol/L    Chloride 105 95 - 110 mmol/L    CO2 24 23 - 29 mmol/L    Glucose 98 70 - 110 mg/dL    BUN 23 8 - 23 mg/dL    Creatinine 0.8 0.5 - 1.4 mg/dL    Calcium 9.1 8.7 - 10.5 mg/dL    Total Protein 6.8 6.0 - 8.4 g/dL    Albumin 3.3 (L) 3.5 - 5.2 g/dL    Total Bilirubin 0.7 0.1 - 1.0 mg/dL    Alkaline Phosphatase 84 55 - 135 U/L    AST 14 10 - 40 U/L    ALT 11 10 - 44 U/L    Anion Gap 13 8 - 16 mmol/L    eGFR >60 >60 mL/min/1.73 m^2   Lactic acid, plasma   Result Value Ref Range    Lactate (Lactic Acid) 0.9 0.5 - 2.2 mmol/L   Urinalysis, Reflex to Urine Culture Urine, Clean Catch    Specimen: Urine   Result Value Ref Range    Specimen UA Urine, Clean Catch     Color, UA Yellow Yellow, Straw, Madai    Appearance, UA Clear Clear    pH, UA 6.0 5.0 - 8.0    Specific Gravity, UA 1.030 1.005 - 1.030    Protein, UA 1+ (A) Negative    Glucose, UA Negative Negative    Ketones, UA 2+ (A) Negative    Bilirubin (UA) 1+ (A) Negative    Occult Blood UA Negative Negative    Nitrite, UA  Negative Negative    Urobilinogen, UA 2.0-3.0 (A) <2.0 EU/dL    Leukocytes, UA Negative Negative   Troponin I   Result Value Ref Range    Troponin I 0.010 0.000 - 0.026 ng/mL   Lipase   Result Value Ref Range    Lipase <3 (L) 4 - 60 U/L   COVID-19 Rapid Screening   Result Value Ref Range    SARS-CoV-2 RNA, Amplification, Qual Negative Negative   Urinalysis Microscopic   Result Value Ref Range    RBC, UA 1 0 - 4 /hpf    WBC, UA 0 0 - 5 /hpf    Bacteria None None-Occ /hpf    Hyaline Casts, UA 1 0-1/lpf /lpf    Microscopic Comment SEE COMMENT          Imaging Results:  Imaging Results              CT Head Without Contrast (Final result)  Result time 10/28/22 05:49:23      Final result by Fredo Lim MD (10/28/22 05:49:23)                   Impression:      Chronic microvascular ischemic changes.  No intracranial hemorrhage.  If there is additional clinical concern for acute infarct, MRI with diffusion weighted imaging recommended.      Electronically signed by: Fredo Lim MD  Date:    10/28/2022  Time:    05:49               Narrative:    EXAMINATION:  CT HEAD WITHOUT CONTRAST    CLINICAL HISTORY:  Neuro deficit, acute, stroke suspected;    Deficit;    TECHNIQUE:  Low dose axial CT images obtained throughout the head without intravenous contrast. Sagittal and coronal reconstructions were performed.  All CT scans at this facility use dose modulation, iterative reconstruction and/or weight based dosing when appropriate to reduce radiation dose to as low as reasonably achievable.    COMPARISON:  12/08/2020    FINDINGS:  Intracranial compartment:    The brain parenchyma demonstrates areas of decreased attenuation with moderate periventricular white matter consistent with chronic microvascular ischemic changes.  Remote bilateral lacunar infarcts..  No parenchymal mass, hemorrhage, edema or major vascular distribution infarct.  Vascular calcifications are noted.    Mild prominence of the sulci and ventricles are  consistent with age-related involutional changes.    No extra-axial blood or fluid collections.    Skull/extracranial contents (limited evaluation): No fracture. Mastoid air cells and paranasal sinuses are essentially clear.                                       X-Ray Chest AP Portable (Final result)  Result time 10/28/22 06:04:02      Final result by Fredo Lim MD (10/28/22 06:04:02)                   Impression:      No acute process seen.      Electronically signed by: Fredo Lim MD  Date:    10/28/2022  Time:    06:04               Narrative:    EXAMINATION:  XR CHEST AP PORTABLE    CLINICAL HISTORY:  Weakness    FINDINGS:  Single view of the chest.    Comparison 09/12/2019.    Cardiac silhouette is normal.  The lungs demonstrate no evidence of active disease.  No evidence of pleural effusion or pneumothorax.  Bones appear intact.  Aorta demonstrates atherosclerotic disease.                                   Chest X ray ED Physician interpreted: No acute findings  Type of Interpretation: Outside Written Report.  Radiology Procedure Done: Head CT without contrast.  Interpretation: 1 No acute intracranial abnormality  2. Chronic infarcts within the cerebellum and periventricular white matter    '      The EKG was ordered, reviewed, and independently interpreted by the ED provider.  Interpretation time: 1:54  Rate: 79 BPM  Rhythm:Sinus rhythm with premature atrial contractions (PAC)  Interpretation: Nonspecific ST and T wave abnormality. No STEMI.             The Emergency Provider reviewed the vital signs and test results, which are outlined above.     ED Discussion     5:34 AM: Spoke with [facility]. Pt is not a surgery candidate for feeding tube placement and this was explained to the family and pt. The family is requesting to find a new hospice program.    5:35 AM: Reassessed pt at this time. Discussed with pt all pertinent ED information and results. Discussed pt dx and plan of tx. Gave pt all  f/u and return to the ED instructions. All questions and concerns were addressed at this time. Pt expresses understanding of information and instructions, and is comfortable with plan to discharge. Pt is stable for discharge.    I discussed with patient and/or family/caretaker that evaluation in the ED does not suggest any emergent or life threatening medical conditions requiring immediate intervention beyond what was provided in the ED, and I believe patient is safe for discharge.  Regardless, an unremarkable evaluation in the ED does not preclude the development or presence of a serious of life threatening condition. As such, patient was instructed to return immediately for any worsening or change in current symptoms.             Medical Decision Making:   Clinical Tests:   Lab Tests: Ordered and Reviewed  Radiological Study: Ordered and Reviewed  Medical Tests: Ordered and Reviewed         ED Medication(s):  Medications   sodium chloride 0.9% bolus 500 mL (0 mLs Intravenous Stopped 10/28/22 0316)       Discharge Medication List as of 10/28/2022  7:13 AM           Follow-up Information       Leslie Poe MD In 3 days.    Specialty: Family Medicine  Contact information:  [address] [facility] [id number] [phone]               [facility] - Emergency Dept..    Specialty: Emergency Medicine  Why: As needed, If symptoms worsen  Contact information:  [id number] [place] [address] [phone]                               Scribe Attestation:   Scribe #1: I performed the above scribed service and the documentation accurately describes the services I performed. I attest to the accuracy of the note.     Attending:   Physician Attestation Statement for Scribe #1: I, Samantha Che MD, personally performed the services described in this documentation, as scribed by Pilo Menard, in my presence, and it is both accurate and complete.           Clinical Impression        ICD-10-CM ICD-9-CM   1. Weakness  R53.1 780.79       Disposition:   Disposition: Discharged  Condition: Stable       Samantha Che MD  11/02/22 0605

## 2022-11-03 ENCOUNTER — TELEPHONE (OUTPATIENT)
Dept: FAMILY MEDICINE | Facility: CLINIC | Age: 82
End: 2022-11-03
Payer: MEDICARE

## 2022-11-03 NOTE — TELEPHONE ENCOUNTER
----- Message from Felipa Martinez sent at 11/3/2022 12:11 PM CDT -----  Pt's daughter is calling in regards to needing a referral fax to [phone] in order to get pt in        [facility]. Caller states that she need this referral fax asap. Caller can be reached at [phone]
